# Patient Record
Sex: FEMALE | Race: WHITE | NOT HISPANIC OR LATINO | Employment: FULL TIME | URBAN - METROPOLITAN AREA
[De-identification: names, ages, dates, MRNs, and addresses within clinical notes are randomized per-mention and may not be internally consistent; named-entity substitution may affect disease eponyms.]

---

## 2017-02-22 ENCOUNTER — HOSPITAL ENCOUNTER (OUTPATIENT)
Dept: MAMMOGRAPHY | Facility: CLINIC | Age: 42
Discharge: HOME | End: 2017-02-22
Payer: COMMERCIAL

## 2017-02-22 DIAGNOSIS — Z12.31 ENCOUNTER FOR SCREENING MAMMOGRAM FOR MALIGNANT NEOPLASM OF BREAST: ICD-10-CM

## 2017-02-22 PROCEDURE — G0202 SCR MAMMO BI INCL CAD: HCPCS

## 2018-01-08 ENCOUNTER — ALLSCRIPTS OFFICE VISIT (OUTPATIENT)
Dept: OTHER | Facility: OTHER | Age: 43
End: 2018-01-08

## 2018-01-08 DIAGNOSIS — N93.9 ABNORMAL UTERINE AND VAGINAL BLEEDING, UNSPECIFIED (CODE): ICD-10-CM

## 2018-01-09 NOTE — PROGRESS NOTES
Assessment   1  Encounter for gynecological examination with abnormal finding (V72 31) (Z01 411)   2  Abnormal uterine bleeding (AUB) (626 9) (N93 9)    Discussion/Summary   health maintenance visit Currently, she eats an adequate diet and has an adequate exercise regimen  cervical cancer screening is needed every three years next cervical cancer screening is due 2019 Breast cancer screening: monthly self breast exam was advised, mammogram has been ordered and mammogram is needed every year  Advice and education were given regarding weight bearing exercise and reproductive health  Patient discussion: discussed with the patient  Annual examination was completed  Mammogram was ordered  We discussed her abnormal bleeding  She was scheduled for CBC, TSH, pelvic ultrasound  We discussed potential etiologies  I have asked patient to continue her multivitamin contains iron  Patient will return to the office in the next 2 months for re-evaluation and potential endometrial biopsy  We did briefly discuss therapies such as OCPs to manage her issue  The patient has the current Goals: Improve menses  The patent has the current Barriers: None  Patient is able to Self-Care  Chief Complaint   annual gyn exam irreg menses      History of Present Illness   HPI: Patient returns for annual gyn visit  She notes heavy irregular menses  Sometimes changing a tampon every hour  She does have bleeding that sometimes last up to 3 weeks at 1 time  She did not have any lab work performed last year and did not go for pelvic ultrasound  She overall feels okay but does have some occasional fatigue  GYN HM, Adult Female [facility]: The patient is being seen for a gynecology evaluation  The last health maintenance visit was 1 year(s) ago  General Health: The patient's health since the last visit is described as good  Lifestyle:  She exercises regularly  She exercises 3 or more times per week   Exercise includes aerobic conditioning -- She does not use tobacco  The patient has never smoked cigarettes  -- She consumes alcohol  She reports occasional alcohol use  Reproductive health:  she uses contraception  For contraception, she has a partner with a vasectomy  -- she is sexually active  She is monogamous with a male partner  Screening: Cervical cancer screening includes a pap smear performed 12/7/15-- and-- human papilloma virus screening performed 12/7/15  Breast cancer screening includes a mammogram performed 17-- and-- monthly breast self-exams performed  Colorectal cancer screening includes a colonoscopy performed 8/27/15  Review of Systems        Constitutional: No fever, no chills, feels well, no tiredness, no recent weight gain or loss  ENT: no ear ache, no loss of hearing, no nosebleeds or nasal discharge, no sore throat or hoarseness  Cardiovascular: no complaints of slow or fast heart rate, no chest pain, no palpitations, no leg claudication or lower extremity edema  Respiratory: no complaints of shortness of breath, no wheezing, no dyspnea on exertion, no orthopnea or PND  Breasts: no complaints of breast pain, breast lump or nipple discharge  Gastrointestinal: no complaints of abdominal pain, no constipation, no nausea or diarrhea, no vomiting, no bloody stools  Genitourinary: as noted in HPI  Musculoskeletal: no complaints of arthralgia, no myalgia, no joint swelling or stiffness, no limb pain or swelling  Integumentary: no complaints of skin rash or lesion, no itching or dry skin, no skin wounds  Neurological: no complaints of headache, no confusion, no numbness or tingling, no dizziness or fainting  OB History   Pregnancy History (Brief):      Prior pregnancies: : 3  Para: 2 (living)      Additional pregnancy history details: 1 miscarriage(s)       Active Problems   1  Abnormal uterine bleeding (AUB) (626 9) (N93 9)   2   BRBPR (bright red blood per rectum) (569 3) (K62 5)   3  Conjunctivitis (372 30) (H10 9)   4  Constipation (564 00) (K59 00)   5  Elderly multigravida with antepartum condition or complication (720 56) (X79 476)   6  Encounter for routine gynecological examination (V72 31) (Z01 419)   7  Encounter for routine postpartum follow-up (V24 2) (Z39 2)   8  Encounter for screening mammogram for malignant neoplasm of breast (V76 12)     (Z12 31)   9  Fetal Growth Problem Suspected, Not Found (V89 04)   10  Hemorrhoids (455 6) (K64 9)   11  Influenza vaccination given (V04 81) (Z23)   12  Pregnancy (V22 2) (Z34 90)   13  Screening for human papillomavirus (HPV) (V73 81) (Z11 51)   14  Urinary frequency (788 41) (R35 0)   15   Uterine scar from previous  delivery, antepartum condition or complication      (412 36) (O34 21)    Past Medical History    · History of Age At First Period 5 Years Old (Menarche)   · History of Encounter for gynecological examination with abnormal finding (V72 31)    (Z01 411)   · History of Inability To Conceive - Female Infertility   · History of Menarche (V21 8)   · History of Spontaneous , complete, without mention of complication (793 70)    (O03 9)   · History of  (vaginal birth after ) (654 21) (O34 219)    Surgical History    · History of  Section   · History of Foot Surgery   · History of Oral Surgery Tooth Extraction    Family History   Mother    · Family history of Family Health Status Of Mother - Alive  Father    · Family history of Family Health Status Of Father -    · Family history of leukemia (V16 6) (Z80 6)  Maternal Grandmother    · Family history of Non-Hodgkin's Lymphoma  Maternal Grandfather    · Family history of Dementia   · Family history of Parkinson Disease   · Family history of Stroke Syndrome (V17 1)  Family History    · Family history of Esophageal Cancer (V16 0)   · Family history of Family Health Status Of Mother - Good    Social History    · Being A Social Drinker   · Denied: History of Coffee   · Currently sexually active   · Daily Tea Consumption (1  Cups/Day)   · Switched recently to decaf   · Exercise: Walking   · Exercises moderately less than 3 times a week   · Marital History - Currently    · Never a smoker   · Never A Smoker   · No drug use   · Partner had vasectomy    Current Meds    1  Daily Multiple Vitamin TABS; Therapy: (Recorded:08Jan2018) to Recorded    Allergies   1  No Known Drug Allergies  2  Mold   3  Seasonal    Vitals    Recorded: [id number] 77:27CZ   Systolic 082, LUE, Sitting   Diastolic 68, LUE, Sitting   Height 5 ft 9 in   Weight 163 lb 3 2 oz   BMI Calculated 24 1   BSA Calculated 1 89   LMP [id number]     Physical Exam        Constitutional      General appearance: No acute distress, well appearing and well nourished  Neck      Neck: Normal, supple, trachea midline, no masses  Thyroid: Normal, no thyromegaly  Pulmonary      Respiratory effort: No increased work of breathing or signs of respiratory distress  Cardiovascular      Peripheral vascular exam: Normal pulses Throughout  Genitourinary      External genitalia: Normal and no lesions appreciated  Vagina: Normal, no lesions or dryness appreciated  Urethra: Normal        Urethral meatus: Normal        Bladder: Normal, soft, non-tender and no prolapse or masses appreciated  Cervix: Normal, no palpable masses  Uterus: Normal, non-tender, not enlarged, and no palpable masses  Adnexa/parametria: Normal, non-tender and no fullness or masses appreciated  Chest      Breasts: Normal and no dimpling or skin changes noted  Abdomen      Abdomen: Normal, non-tender, and no organomegaly noted  Liver and spleen: No hepatomegaly or splenomegaly  Examination for hernias: No hernias appreciated         Lymphatic      Palpation of lymph nodes in neck, axillae, groin and/or other locations: No lymphadenopathy or masses noted  Skin      Skin and subcutaneous tissue: Normal skin turgor and no rashes         Palpation of skin and subcutaneous tissue: Normal        Psychiatric      Orientation to person, place, and time: Normal        Mood and affect: Normal        Signatures    Electronically signed by : Sukhdev Chambers DO; Jan 8 2018  9:55AM EST                       (Author)

## 2018-01-17 ENCOUNTER — APPOINTMENT (OUTPATIENT)
Dept: LAB | Facility: HOSPITAL | Age: 43
End: 2018-01-17
Attending: OBSTETRICS & GYNECOLOGY
Payer: COMMERCIAL

## 2018-01-17 ENCOUNTER — TRANSCRIBE ORDERS (OUTPATIENT)
Dept: ADMINISTRATIVE | Facility: HOSPITAL | Age: 43
End: 2018-01-17

## 2018-01-17 ENCOUNTER — HOSPITAL ENCOUNTER (OUTPATIENT)
Dept: ULTRASOUND IMAGING | Facility: HOSPITAL | Age: 43
Discharge: HOME | End: 2018-01-17
Attending: OBSTETRICS & GYNECOLOGY
Payer: COMMERCIAL

## 2018-01-17 DIAGNOSIS — N93.9 ABNORMAL UTERINE AND VAGINAL BLEEDING, UNSPECIFIED (CODE): ICD-10-CM

## 2018-01-17 LAB
ERYTHROCYTE [DISTWIDTH] IN BLOOD BY AUTOMATED COUNT: 12.5 % (ref 11.6–15.1)
HCT VFR BLD AUTO: 36.4 % (ref 34.8–46.1)
HGB BLD-MCNC: 12.2 G/DL (ref 11.5–15.4)
MCH RBC QN AUTO: 31.7 PG (ref 26.8–34.3)
MCHC RBC AUTO-ENTMCNC: 33.5 G/DL (ref 31.4–37.4)
MCV RBC AUTO: 95 FL (ref 82–98)
PLATELET # BLD AUTO: 261 THOUSANDS/UL (ref 149–390)
PMV BLD AUTO: 11.6 FL (ref 8.9–12.7)
RBC # BLD AUTO: 3.85 MILLION/UL (ref 3.81–5.12)
TSH SERPL DL<=0.05 MIU/L-ACNC: 3.2 UIU/ML (ref 0.36–3.74)
WBC # BLD AUTO: 4.93 THOUSAND/UL (ref 4.31–10.16)

## 2018-01-17 PROCEDURE — 84443 ASSAY THYROID STIM HORMONE: CPT

## 2018-01-17 PROCEDURE — 76856 US EXAM PELVIC COMPLETE: CPT

## 2018-01-17 PROCEDURE — 76830 TRANSVAGINAL US NON-OB: CPT

## 2018-01-17 PROCEDURE — 36415 COLL VENOUS BLD VENIPUNCTURE: CPT

## 2018-01-17 PROCEDURE — 85027 COMPLETE CBC AUTOMATED: CPT

## 2018-01-22 ENCOUNTER — GENERIC CONVERSION - ENCOUNTER (OUTPATIENT)
Dept: OTHER | Facility: OTHER | Age: 43
End: 2018-01-22

## 2018-01-23 VITALS
SYSTOLIC BLOOD PRESSURE: 112 MMHG | DIASTOLIC BLOOD PRESSURE: 68 MMHG | BODY MASS INDEX: 24.17 KG/M2 | HEIGHT: 69 IN | WEIGHT: 163.2 LBS

## 2018-01-24 NOTE — MISCELLANEOUS
Message   Recorded as Task   Date: 2018 12:22 PM, Created By: Leo Carr   Task Name: Follow Up   Assigned To: Haily Rangel   Regarding Patient: Faisal Benítez, Status: In Progress   Comment:    Sage Molina - 2018 12:22 PM     TASK CREATED  inform pt labs and u/s wnl   will reassess at next visit   Jeri Ayers - 2018 8:23 AM     TASK IN PROGRESS   Jeri Ayers - 2018 8:25 AM     TASK EDITED  Patient is aware of results and K87 recommendations  Active Problems    1  Abnormal uterine bleeding (AUB) (626 9) (N93 9)   2  BRBPR (bright red blood per rectum) (569 3) (K62 5)   3  Conjunctivitis (372 30) (H10 9)   4  Constipation (564 00) (K59 00)   5  Elderly multigravida with antepartum condition or complication (660 47) (O84 309)   6  Encounter for gynecological examination with abnormal finding (V72 31) (Z01 411)   7  Encounter for routine gynecological examination (V72 31) (Z01 419)   8  Encounter for routine postpartum follow-up (V24 2) (Z39 2)   9  Encounter for screening mammogram for malignant neoplasm of breast (V76 12)   (Z12 31)   10  Fetal Growth Problem Suspected, Not Found (V89 04)   11  Hemorrhoids (455 6) (K64 9)   12  Influenza vaccination given (V04 81) (Z23)   13  Pregnancy (V22 2) (Z34 90)   14  Screening for human papillomavirus (HPV) (V73 81) (Z11 51)   15  Urinary frequency (788 41) (R35 0)   16  Uterine scar from previous  delivery, antepartum condition or complication    (056 02) (O34 21)    Current Meds   1  Daily Multiple Vitamin TABS; Therapy: (Recorded:2018) to Recorded    Allergies    1  No Known Drug Allergies    2  Mold   3   Seasonal    Signatures   Electronically signed by : Juliano Wisdom, ; 2018  8:25AM EST                       (Author)

## 2018-03-05 ENCOUNTER — OFFICE VISIT (OUTPATIENT)
Dept: OBGYN CLINIC | Facility: CLINIC | Age: 43
End: 2018-03-05
Payer: COMMERCIAL

## 2018-03-05 VITALS — DIASTOLIC BLOOD PRESSURE: 74 MMHG | SYSTOLIC BLOOD PRESSURE: 118 MMHG | WEIGHT: 162.8 LBS | BODY MASS INDEX: 24.04 KG/M2

## 2018-03-05 DIAGNOSIS — N92.0 MENORRHAGIA WITH REGULAR CYCLE: Primary | ICD-10-CM

## 2018-03-05 PROCEDURE — 88305 TISSUE EXAM BY PATHOLOGIST: CPT | Performed by: PATHOLOGY

## 2018-03-05 PROCEDURE — 99213 OFFICE O/P EST LOW 20 MIN: CPT | Performed by: OBSTETRICS & GYNECOLOGY

## 2018-03-05 PROCEDURE — 88305 TISSUE EXAM BY PATHOLOGIST: CPT | Performed by: OBSTETRICS & GYNECOLOGY

## 2018-03-05 PROCEDURE — 58100 BIOPSY OF UTERUS LINING: CPT | Performed by: OBSTETRICS & GYNECOLOGY

## 2018-03-05 RX ORDER — LEVONORGESTREL AND ETHINYL ESTRADIOL 0.15-0.03
1 KIT ORAL DAILY
Qty: 84 TABLET | Refills: 3 | Status: SHIPPED | OUTPATIENT
Start: 2018-03-05 | End: 2018-07-27 | Stop reason: SDUPTHER

## 2018-03-05 NOTE — PROGRESS NOTES
Assessment/Plan:      Diagnoses and all orders for this visit:    Menorrhagia with regular cycle  -     Tissue Exam        Endometrial biopsy was obtained without difficulty  We discussed management and given her heavy bleeding at this time we will proceed straight to OCPs in tapering fashion  A prescription was sent  Follow-up will be via phone with the biopsy results patient also alerted to call me in the next 3 cycles with progress if she is not responding well  Subjective:     Patient ID: Eleanor Fernández is a 36 y o  female  Patient returns for evaluation of heavy menses that are regular in timing  She has had normal CBC and TSH  Ultrasound was essentially normal there was some irregularity of the myometrium noted without discrete finding  Patient unfortunate continues to bleed heavily with each cycle with passage of clots  Review of Systems   Genitourinary: Positive for menstrual problem  All other systems reviewed and are negative  Objective:     Physical Exam   Constitutional: She appears well-developed and well-nourished  Neck: Neck supple  No thyromegaly present  Abdominal: Soft  She exhibits no mass  There is no tenderness  Genitourinary: Vagina normal and uterus normal    Neurological: She is alert  Endometrial biopsy  Date/Time: 3/5/2018 10:16 AM  Performed by: Abelardo Graves  Authorized by: Abelardo Graves     Consent:     Consent obtained:  Written    Consent given by:  Patient    Procedural risks discussed:  Bleeding, damage to other organs and infection    Patient questions answered: yes      Patient agrees, verbalizes understanding, and wants to proceed: yes      Educational handouts given: no      Instructions and paperwork completed: no    Indication:     Indications:  Other disorder of menstruation and other abnormal bleeding from female genital tract    Procedure:     Procedure: endometrial biopsy with Pipelle      A bivalve speculum was placed in the vagina: yes Cervix cleaned and prepped: yes      Uterus sounded: yes      Uterus sound depth (cm): 8 0      Specimen collected: specimen collected and sent to pathology      Patient tolerated procedure well with no complications: yes    Findings:     Cervix: normal      Adnexa: normal

## 2018-05-24 ENCOUNTER — OFFICE VISIT (OUTPATIENT)
Dept: FAMILY MEDICINE CLINIC | Facility: CLINIC | Age: 43
End: 2018-05-24
Payer: COMMERCIAL

## 2018-05-24 VITALS
HEART RATE: 87 BPM | RESPIRATION RATE: 18 BRPM | WEIGHT: 161 LBS | BODY MASS INDEX: 23.05 KG/M2 | OXYGEN SATURATION: 98 % | SYSTOLIC BLOOD PRESSURE: 118 MMHG | HEIGHT: 70 IN | TEMPERATURE: 98.5 F | DIASTOLIC BLOOD PRESSURE: 60 MMHG

## 2018-05-24 DIAGNOSIS — Z13.29 SCREENING FOR THYROID DISORDER: ICD-10-CM

## 2018-05-24 DIAGNOSIS — Z12.31 VISIT FOR SCREENING MAMMOGRAM: ICD-10-CM

## 2018-05-24 DIAGNOSIS — Z00.00 WELL ADULT EXAM: Primary | ICD-10-CM

## 2018-05-24 DIAGNOSIS — K59.09 INTERMITTENT CONSTIPATION: ICD-10-CM

## 2018-05-24 DIAGNOSIS — Z13.220 SCREENING FOR LIPID DISORDERS: ICD-10-CM

## 2018-05-24 DIAGNOSIS — Z13.1 SCREENING FOR DIABETES MELLITUS: ICD-10-CM

## 2018-05-24 PROCEDURE — 99386 PREV VISIT NEW AGE 40-64: CPT | Performed by: NURSE PRACTITIONER

## 2018-05-24 RX ORDER — FLUTICASONE PROPIONATE 50 MCG
1 SPRAY, SUSPENSION (ML) NASAL DAILY
COMMUNITY

## 2018-05-24 NOTE — ASSESSMENT & PLAN NOTE
To make an appointment for mammogram     Pap test due in December  Tetanus up-to-date  Screening labs ordered as requested  Will call with results  Follow-up as needed or in 1 year

## 2018-05-24 NOTE — PROGRESS NOTES
Subjective:     Patient ID: Beba Trujillo is a 36 y o  female  Patient presents for an Work Physical Exam  The patient reports no problems  Ana presents for an initial visit  She does history of heavy menses, she is currently on oral contraceptives  Her past surgical history is significant for foot surgery and tooth extraction  She does report intermittent constipation and diarrhea  She will have a bowel movement once every 3 days, at times her stools will be loose  Denies blood in stools  She often has indigestion after eating, specifically when going out to eat  She did have a colonoscopy a couple years ago, unsure of the exact date  This was [facility] at [facility]  This was normal   Denies fever, weight loss, appetite change, or abdominal pain  Her tetanus is up-to-date  Review of Systems   Constitutional: Negative  HENT: Negative  Eyes: Negative  Respiratory: Negative  Cardiovascular: Negative  Gastrointestinal: Positive for constipation and diarrhea  Negative for blood in stool, nausea and vomiting  Endocrine: Negative  Genitourinary: Negative  Musculoskeletal: Negative  Skin: Negative  Allergic/Immunologic: Positive for environmental allergies  Neurological: Negative  Hematological: Negative  Psychiatric/Behavioral: Negative  The following portions of the patient's history were reviewed and updated as appropriate:   She  has a past medical history of Inability to conceive, female  She  has a past surgical history that includes  section; Foot surgery; and Tooth extraction  Her family history includes Dementia in her maternal grandmother; Esophageal cancer in her family; Leukemia in her father; Lymphoma in her maternal grandmother; Parkinsonism in her maternal grandmother; Stroke in her maternal grandmother  She  reports that she has never smoked  She has never used smokeless tobacco  She reports that she drinks alcohol   She reports that she does not use drugs  Current Outpatient Prescriptions   Medication Sig Dispense Refill    fluticasone (FLONASE) 50 mcg/act nasal spray 1 spray into each nostril daily      levonorgestrel-ethinyl estradiol (NORDETTE) 0 15-30 MG-MCG per tablet Take 1 tablet by mouth daily Begin with 1 tablet BID for 3 days, and then take one tablet at HS 84 tablet 3    Multiple Vitamins-Iron (DAILY MULTIPLE VITAMIN/IRON) TABS Take by mouth       No current facility-administered medications for this visit  Current Outpatient Prescriptions on File Prior to Visit   Medication Sig    levonorgestrel-ethinyl estradiol (NORDETTE) 0 15-30 MG-MCG per tablet Take 1 tablet by mouth daily Begin with 1 tablet BID for 3 days, and then take one tablet at HS     No current facility-administered medications on file prior to visit  She is allergic to mold extract [trichophyton] and pollen extract       No results found for this or any previous visit  No exam data present    /60   Pulse 87   Temp 98 5 °F (36 9 °C)   Resp 18   Ht 5' 10" (1 778 m)   Wt 73 kg (161 lb)   LMP 05/20/2018 (Exact Date)   SpO2 98%   BMI 23 10 kg/m²   Social History     Social History    Marital status: /Civil Union     Spouse name: N/A    Number of children: N/A    Years of education: N/A     Occupational History    Not on file       Social History Main Topics    Smoking status: Never Smoker    Smokeless tobacco: Never Used    Alcohol use Yes      Comment: social    Drug use: No    Sexual activity: Yes     Birth control/ protection: Male Sterilization     Other Topics Concern    Not on file     Social History Narrative    1 cup tea/day- switched recently to decaf    Exercise - walking    Exercises moderately less than 3 times/wk     Past Medical History:   Diagnosis Date    Inability to conceive, female      Family History   Problem Relation Age of Onset    Leukemia Father     Dementia Maternal Grandmother     Parkinsonism Maternal Grandmother     Stroke Maternal Grandmother     Lymphoma Maternal Grandmother     Esophageal cancer Family       Past Surgical History:   Procedure Laterality Date     SECTION      FOOT SURGERY      TOOTH EXTRACTION         Current Outpatient Prescriptions:     fluticasone (FLONASE) 50 mcg/act nasal spray, 1 spray into each nostril daily, Disp: , Rfl:     levonorgestrel-ethinyl estradiol (NORDETTE) 0 15-30 MG-MCG per tablet, Take 1 tablet by mouth daily Begin with 1 tablet BID for 3 days, and then take one tablet at HS, Disp: 84 tablet, Rfl: 3    Multiple Vitamins-Iron (DAILY MULTIPLE VITAMIN/IRON) TABS, Take by mouth, Disp: , Rfl:       Colonoscopy a couple years ago, unsure of exact year  Normal    Pap Test 2015      Screening Labs None recent     Preventive screening:   Last mammogram: normal Date:    Last pap: normal Date:       Objective:     Physical Exam   Constitutional: She is oriented to person, place, and time  She appears well-developed and well-nourished  No distress  HENT:   Head: Normocephalic and atraumatic  Right Ear: Tympanic membrane and external ear normal    Left Ear: Tympanic membrane and external ear normal    Nose: Nose normal    Mouth/Throat: Oropharynx is clear and moist    Eyes: Conjunctivae and EOM are normal  Pupils are equal, round, and reactive to light  Neck: Normal range of motion  Neck supple  Cardiovascular: Normal rate, regular rhythm and normal heart sounds  No murmur heard  Pulmonary/Chest: Effort normal and breath sounds normal  No respiratory distress  She has no wheezes  She has no rales  She exhibits no tenderness  Abdominal: Soft  Bowel sounds are normal  She exhibits no distension and no mass  There is no tenderness  There is no rebound and no guarding  Musculoskeletal: Normal range of motion  She exhibits no edema, tenderness or deformity  Lymphadenopathy:     She has no cervical adenopathy     Neurological: She is alert and oriented to person, place, and time  No cranial nerve deficit  Skin: Skin is warm and dry  No rash noted  No erythema  No pallor  Psychiatric: She has a normal mood and affect  Her behavior is normal  Judgment and thought content normal    Nursing note and vitals reviewed  Assessment/Plan:   Well adult exam  To make an appointment for mammogram     Pap test due in December  Tetanus up-to-date  Screening labs ordered as requested  Will call with results  Follow-up as needed or in 1 year  Intermittent constipation  Counseled on the importance adequate dietary fiber and fluid intake  Counseled on daily physical activity  Discussed trying a probiotic  To follow up with our office if no improvement

## 2018-05-24 NOTE — ASSESSMENT & PLAN NOTE
Counseled on the importance adequate dietary fiber and fluid intake  Counseled on daily physical activity  Discussed trying a probiotic  To follow up with our office if no improvement

## 2018-05-25 ENCOUNTER — LAB (OUTPATIENT)
Dept: LAB | Facility: HOSPITAL | Age: 43
End: 2018-05-25
Payer: COMMERCIAL

## 2018-05-25 DIAGNOSIS — Z13.29 SCREENING FOR THYROID DISORDER: ICD-10-CM

## 2018-05-25 DIAGNOSIS — Z13.220 SCREENING FOR LIPID DISORDERS: ICD-10-CM

## 2018-05-25 DIAGNOSIS — Z13.1 SCREENING FOR DIABETES MELLITUS: ICD-10-CM

## 2018-05-25 LAB
ALBUMIN SERPL BCP-MCNC: 3.4 G/DL (ref 3.5–5)
ALP SERPL-CCNC: 68 U/L (ref 46–116)
ALT SERPL W P-5'-P-CCNC: 23 U/L (ref 12–78)
ANION GAP SERPL CALCULATED.3IONS-SCNC: 10 MMOL/L (ref 4–13)
AST SERPL W P-5'-P-CCNC: 15 U/L (ref 5–45)
BILIRUB SERPL-MCNC: 0.4 MG/DL (ref 0.2–1)
BUN SERPL-MCNC: 12 MG/DL (ref 5–25)
CALCIUM SERPL-MCNC: 9.2 MG/DL (ref 8.3–10.1)
CHLORIDE SERPL-SCNC: 105 MMOL/L (ref 100–108)
CHOLEST SERPL-MCNC: 153 MG/DL (ref 50–200)
CO2 SERPL-SCNC: 28 MMOL/L (ref 21–32)
CREAT SERPL-MCNC: 0.82 MG/DL (ref 0.6–1.3)
GFR SERPL CREATININE-BSD FRML MDRD: 88 ML/MIN/1.73SQ M
GLUCOSE P FAST SERPL-MCNC: 91 MG/DL (ref 65–99)
HDLC SERPL-MCNC: 42 MG/DL (ref 40–60)
LDLC SERPL CALC-MCNC: 104 MG/DL (ref 0–100)
POTASSIUM SERPL-SCNC: 3.7 MMOL/L (ref 3.5–5.3)
PROT SERPL-MCNC: 7.4 G/DL (ref 6.4–8.2)
SODIUM SERPL-SCNC: 143 MMOL/L (ref 136–145)
TRIGL SERPL-MCNC: 36 MG/DL
TSH SERPL DL<=0.05 MIU/L-ACNC: 2.35 UIU/ML (ref 0.36–3.74)

## 2018-05-25 PROCEDURE — 80061 LIPID PANEL: CPT

## 2018-05-25 PROCEDURE — 80053 COMPREHEN METABOLIC PANEL: CPT

## 2018-05-25 PROCEDURE — 84443 ASSAY THYROID STIM HORMONE: CPT

## 2018-05-25 PROCEDURE — 36415 COLL VENOUS BLD VENIPUNCTURE: CPT

## 2018-06-05 ENCOUNTER — HOSPITAL ENCOUNTER (OUTPATIENT)
Dept: MAMMOGRAPHY | Facility: CLINIC | Age: 43
Discharge: HOME | End: 2018-06-05
Payer: COMMERCIAL

## 2018-06-05 DIAGNOSIS — Z12.31 VISIT FOR SCREENING MAMMOGRAM: ICD-10-CM

## 2018-06-05 PROCEDURE — 77067 SCR MAMMO BI INCL CAD: CPT

## 2018-06-05 PROCEDURE — 77063 BREAST TOMOSYNTHESIS BI: CPT

## 2018-07-27 DIAGNOSIS — N92.0 MENORRHAGIA WITH REGULAR CYCLE: Primary | ICD-10-CM

## 2018-07-27 RX ORDER — LEVONORGESTREL AND ETHINYL ESTRADIOL 0.15-0.03
1 KIT ORAL DAILY
Qty: 84 TABLET | Refills: 1 | Status: SHIPPED | OUTPATIENT
Start: 2018-07-27 | End: 2020-01-20 | Stop reason: ALTCHOICE

## 2018-12-20 ENCOUNTER — OFFICE VISIT (OUTPATIENT)
Dept: URGENT CARE | Facility: CLINIC | Age: 43
End: 2018-12-20
Payer: COMMERCIAL

## 2018-12-20 VITALS
RESPIRATION RATE: 18 BRPM | HEART RATE: 85 BPM | HEIGHT: 70 IN | DIASTOLIC BLOOD PRESSURE: 80 MMHG | BODY MASS INDEX: 23.62 KG/M2 | WEIGHT: 165 LBS | SYSTOLIC BLOOD PRESSURE: 124 MMHG | OXYGEN SATURATION: 98 % | TEMPERATURE: 97.6 F

## 2018-12-20 DIAGNOSIS — H10.9 CONJUNCTIVITIS OF BOTH EYES, UNSPECIFIED CONJUNCTIVITIS TYPE: Primary | ICD-10-CM

## 2018-12-20 PROCEDURE — S9083 URGENT CARE CENTER GLOBAL: HCPCS | Performed by: PHYSICIAN ASSISTANT

## 2018-12-20 PROCEDURE — G0382 LEV 3 HOSP TYPE B ED VISIT: HCPCS | Performed by: PHYSICIAN ASSISTANT

## 2018-12-20 RX ORDER — TOBRAMYCIN 3 MG/ML
1 SOLUTION/ DROPS OPHTHALMIC
Qty: 1 BOTTLE | Refills: 0 | Status: SHIPPED | OUTPATIENT
Start: 2018-12-20 | End: 2018-12-27

## 2018-12-20 NOTE — PATIENT INSTRUCTIONS
1  Conjunctivitis  -Use eye drops as directed  -Wash hands well  -Wash pillowcases  -Follow-up with PCP if no improvement within 5-7 days    Go to ER with worsening symptoms, fever, visual changes or any new concerns    Conjunctivitis   AMBULATORY CARE:   Conjunctivitis,  or pink eye, is inflammation of your conjunctiva  The conjunctiva is a thin tissue that covers the front of your eye and the back of your eyelids  The conjunctiva helps protect your eye and keep it moist  Conjunctivitis may be caused by bacteria, allergies, or a virus  If your conjunctivitis is caused by bacteria, it may get better on its own in about 7 days  Viral conjunctivitis can last up to 3 weeks  Common symptoms may include any of the following: You will usually have symptoms in both eyes if your conjunctivitis is caused by allergies  You may also have other allergic symptoms, such as a rash or runny nose  Symptoms will usually start in 1 eye if your conjunctivitis is caused by a virus or bacteria  · Redness in the whites of your eye    · Itching in your eye or around your eye    · Feeling like there is something in your eye    · Watery or thick, sticky discharge    · Crusty eyelids when you wake up in the morning    · Burning, stinging, or swelling in your eye    · Pain when you see bright light  Seek care immediately if:   · You have worsening eye pain  · The swelling in your eye gets worse, even after treatment  · Your vision suddenly becomes worse or you cannot see at all  Contact your healthcare provider if:   · You develop a fever and ear pain  · You have tiny bumps or spots of blood on your eye  · You have questions or concerns about your condition or care  Treatment  will depend on the cause of your conjunctivitis  You may need antibiotics or allergy medicine as a pill, eye drop, or eye ointment  Manage your symptoms:   · Apply a cool compress  Wet a washcloth with cold water and place it on your eye   This will help decrease itching and irritation  · Do not wear contact lenses  They can irritate your eye  Throw away the pair you are using and ask when you can wear them again  Use a new pair of lenses when your healthcare provider says it is okay  · Avoid irritants  Stay away from smoke filled areas  Shield your eyes from wind and sun  · Flush your eye  You may need to flush your eye with saline to help decrease your symptoms  Ask for more information on how to flush your eye  Medicines:  Treatment depends on what is causing your conjunctivitis  You may be given any of the following:  · Allergy medicine  helps decrease itchy, red, swollen eyes caused by allergies  It may be given as a pill, eye drops, or nasal spray  · Antibiotics  may be needed if your conjunctivitis is caused by bacteria  This medicine may be given as a pill, eye drops, or eye ointment  · Take your medicine as directed  Contact your healthcare provider if you think your medicine is not helping or if you have side effects  Tell him or her if you are allergic to any medicine  Keep a list of the medicines, vitamins, and herbs you take  Include the amounts, and when and why you take them  Bring the list or the pill bottles to follow-up visits  Carry your medicine list with you in case of an emergency  Prevent the spread of conjunctivitis:   · Wash your hands with soap and water often  Wash your hands before and after you touch your eyes  Also wash your hands before you prepare or eat food and after you use the bathroom or change a diaper  · Avoid allergens  Try to avoid the things that cause your allergies, such as pets, dust, or grass  · Avoid contact with others  Do not share towels or washcloths  Try to stay away from others as much as possible  Ask when you can return to work or school  · Throw away eye makeup  The bacteria that caused your conjunctivitis can stay in eye makeup   Throw away mascara and other eye makeup  © 2017 [facility] Information is for End User's use only and may not be sold, redistributed or otherwise used for commercial purposes  All illustrations and images included in CareNotes® are the copyrighted property of [employer] D [employer] M [employer] Inc  or Trell Carr  The above information is an  only  It is not intended as medical advice for individual conditions or treatments  Talk to your doctor, nurse or pharmacist before following any medical regimen to see if it is safe and effective for you

## 2018-12-20 NOTE — PROGRESS NOTES
[email or web address]        NAME: Kayleen Rojas is a 39 y o  female  : 1975    MRN: [id number]  DATE: 2018  TIME: 8:37 AM    Assessment and Plan   Conjunctivitis of both eyes, unspecified conjunctivitis type [H10 9]  1  Conjunctivitis of both eyes, unspecified conjunctivitis type  tobramycin (TOBREX) 0 3 % SOLN         Patient Instructions     1  Conjunctivitis  -Use eye drops as directed  -Wash hands well  -Wash pillowcases  -Follow-up with PCP if no improvement within 5-7 days    Go to ER with worsening symptoms, fever, visual changes or any new concerns    Chief Complaint     Chief Complaint   Patient presents with    Conjunctivitis     x24 hrs, bilat         History of Present Illness       Patient is a 58-year-old female who presents today for evaluation of bilateral eye redness that started late last night/early this morning  Patient states that this morning she said that her eyes were very red and she had thick drainage from them  She states that her eyes are itchy  Patient states that she works in [employer] and was around little kids yesterday  Patient denies contact lens use  She denies any injury or trauma to her eyes  Review of Systems   Review of Systems   Constitutional: Negative for chills and fever  HENT: Negative for ear pain, rhinorrhea and sore throat  Eyes: Positive for discharge, redness and itching  Negative for photophobia, pain and visual disturbance  Neurological: Negative for headaches           Current Medications       Current Outpatient Prescriptions:     fluticasone (FLONASE) 50 mcg/act nasal spray, 1 spray into each nostril daily, Disp: , Rfl:     levonorgestrel-ethinyl estradiol (NORDETTE) 0 15-30 MG-MCG per tablet, Take 1 tablet by mouth daily Begin with 1 tablet BID for 3 days, and then take one tablet at HS, Disp: 84 tablet, Rfl: 1    Multiple Vitamins-Iron (DAILY MULTIPLE VITAMIN/IRON) TABS, Take by mouth, Disp: , Rfl:     tobramycin (TOBREX) 0 3 % SOLN, Administer 1 drop to both eyes every 4 (four) hours while awake for 7 days, Disp: 1 Bottle, Rfl: 0    Current Allergies     Allergies as of 2018 - Reviewed 2018   Allergen Reaction Noted    Mold extract [trichophyton] Itching and Sneezing 09/10/2013    Pollen extract Itching and Sneezing 09/10/2013            The following portions of the patient's history were reviewed and updated as appropriate: allergies, current medications, past family history, past medical history, past social history, past surgical history and problem list      Past Medical History:   Diagnosis Date    Inability to conceive, female        Past Surgical History:   Procedure Laterality Date     SECTION      FOOT SURGERY      TOOTH EXTRACTION         Family History   Problem Relation Age of Onset    Leukemia Father     Dementia Maternal Grandmother     Parkinsonism Maternal Grandmother     Stroke Maternal Grandmother     Lymphoma Maternal Grandmother     Esophageal cancer Family          Medications have been verified  Objective   /80   Pulse 85   Temp 97 6 °F (36 4 °C) (Temporal)   Resp 18   Ht 5' 10" (1 778 m)   Wt 74 8 kg (165 lb)   SpO2 98%   BMI 23 68 kg/m²        Physical Exam     Physical Exam   Constitutional: She is oriented to person, place, and time  She appears well-developed and well-nourished  No distress  HENT:   Right Ear: Tympanic membrane and external ear normal    Left Ear: Tympanic membrane and external ear normal    Nose: Nose normal    Mouth/Throat: Uvula is midline, oropharynx is clear and moist and mucous membranes are normal    Eyes: Pupils are equal, round, and reactive to light  EOM are normal  Right conjunctiva is injected  Left conjunctiva is injected  Neck: Normal range of motion  Neck supple  Cardiovascular: Normal rate, regular rhythm and normal heart sounds      Pulmonary/Chest: Effort normal and breath sounds normal  She has no wheezes  She has no rales  Lymphadenopathy:     She has no cervical adenopathy  Neurological: She is alert and oriented to person, place, and time  Skin: Skin is warm and dry  Psychiatric: She has a normal mood and affect  Nursing note and vitals reviewed

## 2018-12-28 ENCOUNTER — OFFICE VISIT (OUTPATIENT)
Dept: URGENT CARE | Facility: CLINIC | Age: 43
End: 2018-12-28
Payer: COMMERCIAL

## 2018-12-28 VITALS
HEART RATE: 89 BPM | TEMPERATURE: 98.6 F | RESPIRATION RATE: 18 BRPM | DIASTOLIC BLOOD PRESSURE: 78 MMHG | SYSTOLIC BLOOD PRESSURE: 120 MMHG | BODY MASS INDEX: 23.62 KG/M2 | WEIGHT: 165 LBS | HEIGHT: 70 IN | OXYGEN SATURATION: 98 %

## 2018-12-28 DIAGNOSIS — J06.9 UPPER RESPIRATORY TRACT INFECTION, UNSPECIFIED TYPE: Primary | ICD-10-CM

## 2018-12-28 PROCEDURE — S9083 URGENT CARE CENTER GLOBAL: HCPCS | Performed by: PHYSICIAN ASSISTANT

## 2018-12-28 PROCEDURE — G0382 LEV 3 HOSP TYPE B ED VISIT: HCPCS | Performed by: PHYSICIAN ASSISTANT

## 2018-12-28 NOTE — PROGRESS NOTES
[facility] [employer] [facility]        NAME: Noreen San is a 39 y o  female  : 1975    MRN: [id number]  DATE: 2018  TIME: 9:19 AM    Assessment and Plan   Upper respiratory tract infection, unspecified type [J06 9]  1  Upper respiratory tract infection, unspecified type           Patient Instructions     1  Upper respiratory infection  -Patient declines throat swab  -Finish course of eye drops as prescribed  -Increase fluids  -Tylenol/motrin  -Salt H20 gargles/throat lozenges  -Saline nasal spray  -Try using humidifier at nighttime    -Follow-up with PCP within 5 days  If no improvement of drainage from eyes- follow-up with an eye doctor for re-evaluation next week  -Go to ER with worsening symptoms, difficulty breathing, high fever, or any signs of distress      Chief Complaint     Chief Complaint   Patient presents with    Sore Throat     x24 hrs   Nasal Congestion    Conjunctivitis         History of Present Illness       Patient is a 50-year-old female who presents today for evaluation of cold symptoms that started yesterday  Patient admits having some nasal congestion along with a slight sore throat  Patient was seen here last week and was diagnosed with conjunctivitis and was given tobramycin eyedrops  Patient states that she has been using the eyedrops as directed however this morning she still continues to have some drainage from her eyes  However she states that overall the eye redness has improved  She denies any visual changes  Review of Systems   Review of Systems   Constitutional: Negative for chills and fever  HENT: Positive for rhinorrhea and sore throat  Negative for ear pain  Eyes: Positive for discharge and redness  Negative for pain and visual disturbance  Respiratory: Negative for shortness of breath  Cardiovascular: Negative for chest pain  Musculoskeletal: Negative for arthralgias  Neurological: Negative for headaches           Current Medications Current Outpatient Prescriptions:     fluticasone (FLONASE) 50 mcg/act nasal spray, 1 spray into each nostril daily, Disp: , Rfl:     levonorgestrel-ethinyl estradiol (NORDETTE) 0 15-30 MG-MCG per tablet, Take 1 tablet by mouth daily Begin with 1 tablet BID for 3 days, and then take one tablet at HS, Disp: 84 tablet, Rfl: 1    Multiple Vitamins-Iron (DAILY MULTIPLE VITAMIN/IRON) TABS, Take by mouth, Disp: , Rfl:     Current Allergies     Allergies as of 2018 - Reviewed 2018   Allergen Reaction Noted    Mold extract [trichophyton] Itching and Sneezing 09/10/2013    Pollen extract Itching and Sneezing 09/10/2013            The following portions of the patient's history were reviewed and updated as appropriate: allergies, current medications, past family history, past medical history, past social history, past surgical history and problem list      Past Medical History:   Diagnosis Date    Inability to conceive, female        Past Surgical History:   Procedure Laterality Date     SECTION      FOOT SURGERY      TOOTH EXTRACTION         Family History   Problem Relation Age of Onset    Leukemia Father     Dementia Maternal Grandmother     Parkinsonism Maternal Grandmother     Stroke Maternal Grandmother     Lymphoma Maternal Grandmother     Esophageal cancer Family          Medications have been verified  Objective   /78   Pulse 89   Temp 98 6 °F (37 °C) (Temporal)   Resp 18   Ht 5' 10" (1 778 m)   Wt 74 8 kg (165 lb)   SpO2 98%   BMI 23 68 kg/m²        Physical Exam     Physical Exam   Constitutional: She is oriented to person, place, and time  She appears well-developed and well-nourished  No distress  HENT:   Right Ear: Tympanic membrane and external ear normal    Left Ear: Tympanic membrane and external ear normal    Nose: Nose normal    Mouth/Throat: Uvula is midline and mucous membranes are normal  Posterior oropharyngeal erythema present   No oropharyngeal exudate or tonsillar abscesses  Eyes: Pupils are equal, round, and reactive to light  Conjunctivae and EOM are normal  Right eye exhibits no discharge  Left eye exhibits no discharge  Neck: Normal range of motion  Neck supple  Cardiovascular: Normal rate, regular rhythm and normal heart sounds  Pulmonary/Chest: Effort normal and breath sounds normal  She has no wheezes  She has no rales  Lymphadenopathy:     She has no cervical adenopathy  Neurological: She is alert and oriented to person, place, and time  Skin: Skin is warm and dry  Psychiatric: She has a normal mood and affect  Nursing note and vitals reviewed

## 2018-12-28 NOTE — PATIENT INSTRUCTIONS
1  Upper respiratory infection  -Patient declines throat swab  -Finish course of eye drops as prescribed  -Increase fluids  -Tylenol/motrin  -Salt H20 gargles/throat lozenges  -Saline nasal spray  -Try using humidifier at nighttime    -Follow-up with PCP within 5 days   If no improvement of drainage from eyes- follow-up with an eye doctor for re-evaluation next week  -Go to ER with worsening symptoms, difficulty breathing, high fever, or any signs of distress

## 2019-01-15 ENCOUNTER — ANNUAL EXAM (OUTPATIENT)
Dept: OBGYN CLINIC | Facility: CLINIC | Age: 44
End: 2019-01-15
Payer: COMMERCIAL

## 2019-01-15 VITALS
BODY MASS INDEX: 24.39 KG/M2 | SYSTOLIC BLOOD PRESSURE: 92 MMHG | DIASTOLIC BLOOD PRESSURE: 60 MMHG | WEIGHT: 170.4 LBS | HEIGHT: 70 IN

## 2019-01-15 DIAGNOSIS — R92.2 DENSE BREAST: ICD-10-CM

## 2019-01-15 DIAGNOSIS — Z01.419 ENCOUNTER FOR GYNECOLOGICAL EXAMINATION (GENERAL) (ROUTINE) WITHOUT ABNORMAL FINDINGS: Primary | ICD-10-CM

## 2019-01-15 DIAGNOSIS — Z12.39 SCREENING FOR MALIGNANT NEOPLASM OF BREAST: ICD-10-CM

## 2019-01-15 DIAGNOSIS — Z11.51 SCREENING FOR HUMAN PAPILLOMAVIRUS (HPV): ICD-10-CM

## 2019-01-15 DIAGNOSIS — Z12.4 CERVICAL CANCER SCREENING: ICD-10-CM

## 2019-01-15 PROBLEM — R92.30 DENSE BREAST: Status: ACTIVE | Noted: 2019-01-15

## 2019-01-15 PROCEDURE — G0145 SCR C/V CYTO,THINLAYER,RESCR: HCPCS | Performed by: OBSTETRICS & GYNECOLOGY

## 2019-01-15 PROCEDURE — 87624 HPV HI-RISK TYP POOLED RSLT: CPT | Performed by: OBSTETRICS & GYNECOLOGY

## 2019-01-15 PROCEDURE — S0612 ANNUAL GYNECOLOGICAL EXAMINA: HCPCS | Performed by: OBSTETRICS & GYNECOLOGY

## 2019-01-15 NOTE — PROGRESS NOTES
Assessment/Plan:    No problem-specific Assessment & Plan notes found for this encounter  Diagnoses and all orders for this visit:    Encounter for gynecological examination (general) (routine) without abnormal findings  -     Liquid-based pap, screening    Dense breast  -     Mammo screening bilateral w 3d & cad; Future    Screening for malignant neoplasm of breast  -     Mammo screening bilateral w 3d & cad; Future    Cervical cancer screening  -     Liquid-based pap, screening    Screening for human papillomavirus (HPV)  -     Liquid-based pap, screening        Annual examination was completed  Pap with HPV testing was obtained  Mammogram was ordered  Patient will continue on OCPs for cycle control        Patient to return to the office in 1 year or as necessary  Subjective:      Patient ID: Manuel Vidales is a 39 y o  female  Patient returns for annual gyn visit  She has no new medical surgical issues  She has done quite well with control of her menses with OCPs  There are properly timed an average in flow  She has no intermenstrual bleeding  Patient is also begun a aggressive exercise routine and is running on a regular basis  Gynecologic Exam         The following portions of the patient's history were reviewed and updated as appropriate:   She  has a past medical history of Inability to conceive, female    She   Patient Active Problem List    Diagnosis Date Noted    Encounter for gynecological examination (general) (routine) without abnormal findings 01/15/2019    Dense breast 01/15/2019    Screening for malignant neoplasm of breast 01/15/2019    Cervical cancer screening 01/15/2019    Screening for human papillomavirus (HPV) 01/15/2019    Well adult exam 05/24/2018    Visit for screening mammogram 05/24/2018    Screening for lipid disorders 05/24/2018    Screening for thyroid disorder 05/24/2018    Screening for diabetes mellitus 05/24/2018    Intermittent constipation 2018    Menorrhagia with regular cycle 2018     She  has a past surgical history that includes  section; Foot surgery; and Tooth extraction  Her family history includes Dementia in her maternal grandmother; Esophageal cancer in her family; Leukemia in her father; Lymphoma in her maternal grandmother; Parkinsonism in her maternal grandmother; Stroke in her maternal grandmother  She  reports that she has never smoked  She has never used smokeless tobacco  She reports that she drinks alcohol  She reports that she does not use drugs  Current Outpatient Prescriptions   Medication Sig Dispense Refill    fluticasone (FLONASE) 50 mcg/act nasal spray 1 spray into each nostril daily      levonorgestrel-ethinyl estradiol (NORDETTE) 0 15-30 MG-MCG per tablet Take 1 tablet by mouth daily Begin with 1 tablet BID for 3 days, and then take one tablet at HS 84 tablet 1    Multiple Vitamins-Iron (DAILY MULTIPLE VITAMIN/IRON) TABS Take by mouth       No current facility-administered medications for this visit  Current Outpatient Prescriptions on File Prior to Visit   Medication Sig    fluticasone (FLONASE) 50 mcg/act nasal spray 1 spray into each nostril daily    levonorgestrel-ethinyl estradiol (NORDETTE) 0 15-30 MG-MCG per tablet Take 1 tablet by mouth daily Begin with 1 tablet BID for 3 days, and then take one tablet at HS    Multiple Vitamins-Iron (DAILY MULTIPLE VITAMIN/IRON) TABS Take by mouth     No current facility-administered medications on file prior to visit  She is allergic to mold extract [trichophyton] and pollen extract       Review of Systems   All other systems reviewed and are negative  Objective:      BP 92/60 (BP Location: Left arm, Patient Position: Sitting, Cuff Size: Standard)   Ht 5' 10" (1 778 m)   Wt 77 3 kg (170 lb 6 4 oz)   LMP 2019 (Exact Date)   BMI 24 45 kg/m²          Physical Exam   Constitutional: She is oriented to person, place, and time   She appears well-developed and well-nourished  HENT:   Head: Normocephalic and atraumatic  Nose: Nose normal    Eyes: Pupils are equal, round, and reactive to light  EOM are normal    Neck: Normal range of motion  Neck supple  No thyromegaly present  Cardiovascular: Normal rate and regular rhythm  Pulmonary/Chest: Effort normal and breath sounds normal  No respiratory distress  Breasts no masses, tenderness, skin changes   Abdominal: Soft  Bowel sounds are normal  She exhibits no distension and no mass  There is no tenderness  Hernia confirmed negative in the right inguinal area and confirmed negative in the left inguinal area  Genitourinary: Vagina normal and uterus normal  No breast swelling, tenderness, discharge or bleeding  Pelvic exam was performed with patient supine  No labial fusion  There is no rash, tenderness, lesion or injury on the right labia  There is no rash, tenderness, lesion or injury on the left labia  Cervix exhibits no motion tenderness, no discharge and no friability  Genitourinary Comments: Ext genitalia nl female w/o lesions  Vag healthy without lesions or discharge  Cervix healthy w/o lesions or discharge  uterus nl size, NT, no mass  Adnexa NT, no mass   Musculoskeletal: Normal range of motion  She exhibits no edema  Lymphadenopathy:        Right: No inguinal adenopathy present  Left: No inguinal adenopathy present  Neurological: She is alert and oriented to person, place, and time  She has normal reflexes  Skin: Skin is warm and dry  No rash noted  Psychiatric: She has a normal mood and affect  Her behavior is normal  Thought content normal    Nursing note and vitals reviewed

## 2019-01-16 LAB
HPV HR 12 DNA CVX QL NAA+PROBE: NEGATIVE
HPV16 DNA CVX QL NAA+PROBE: NEGATIVE
HPV18 DNA CVX QL NAA+PROBE: NEGATIVE

## 2019-01-23 LAB
LAB AP GYN PRIMARY INTERPRETATION: NORMAL
LAB AP LMP: NORMAL
Lab: NORMAL

## 2019-03-31 DIAGNOSIS — N92.0 MENORRHAGIA WITH REGULAR CYCLE: ICD-10-CM

## 2019-03-31 RX ORDER — LEVONORGESTREL AND ETHINYL ESTRADIOL 0.15-0.03
KIT ORAL
Qty: 84 TABLET | Refills: 2 | Status: SHIPPED | OUTPATIENT
Start: 2019-03-31 | End: 2019-12-04 | Stop reason: SDUPTHER

## 2019-04-29 ENCOUNTER — TELEPHONE (OUTPATIENT)
Dept: FAMILY MEDICINE CLINIC | Facility: CLINIC | Age: 44
End: 2019-04-29

## 2019-05-01 DIAGNOSIS — Z82.49 FAMILY HISTORY OF BRAIN ANEURYSM: Primary | ICD-10-CM

## 2019-06-06 ENCOUNTER — HOSPITAL ENCOUNTER (OUTPATIENT)
Dept: MAMMOGRAPHY | Facility: CLINIC | Age: 44
Discharge: HOME | End: 2019-06-06
Payer: COMMERCIAL

## 2019-06-06 ENCOUNTER — HOSPITAL ENCOUNTER (OUTPATIENT)
Dept: MRI IMAGING | Facility: CLINIC | Age: 44
Discharge: HOME | End: 2019-06-06
Payer: COMMERCIAL

## 2019-06-06 VITALS — HEIGHT: 70 IN | WEIGHT: 165 LBS | BODY MASS INDEX: 23.62 KG/M2

## 2019-06-06 DIAGNOSIS — R92.2 DENSE BREAST: ICD-10-CM

## 2019-06-06 DIAGNOSIS — Z12.39 SCREENING FOR MALIGNANT NEOPLASM OF BREAST: ICD-10-CM

## 2019-06-06 DIAGNOSIS — Z82.49 FAMILY HISTORY OF BRAIN ANEURYSM: ICD-10-CM

## 2019-06-06 PROCEDURE — 77063 BREAST TOMOSYNTHESIS BI: CPT

## 2019-06-06 PROCEDURE — 77067 SCR MAMMO BI INCL CAD: CPT

## 2019-06-06 PROCEDURE — 70544 MR ANGIOGRAPHY HEAD W/O DYE: CPT

## 2019-06-07 ENCOUNTER — TELEPHONE (OUTPATIENT)
Dept: FAMILY MEDICINE CLINIC | Facility: CLINIC | Age: 44
End: 2019-06-07

## 2019-06-18 ENCOUNTER — OFFICE VISIT (OUTPATIENT)
Dept: OBGYN CLINIC | Facility: MEDICAL CENTER | Age: 44
End: 2019-06-18
Payer: COMMERCIAL

## 2019-06-18 VITALS
HEIGHT: 70 IN | SYSTOLIC BLOOD PRESSURE: 112 MMHG | BODY MASS INDEX: 24.21 KG/M2 | DIASTOLIC BLOOD PRESSURE: 82 MMHG | WEIGHT: 169.13 LBS

## 2019-06-18 DIAGNOSIS — N76.0 ACUTE VAGINITIS: Primary | ICD-10-CM

## 2019-06-18 PROBLEM — Z12.4 CERVICAL CANCER SCREENING: Status: RESOLVED | Noted: 2019-01-15 | Resolved: 2019-06-18

## 2019-06-18 PROBLEM — Z13.220 SCREENING FOR LIPID DISORDERS: Status: RESOLVED | Noted: 2018-05-24 | Resolved: 2019-06-18

## 2019-06-18 PROBLEM — Z00.00 WELL ADULT EXAM: Status: RESOLVED | Noted: 2018-05-24 | Resolved: 2019-06-18

## 2019-06-18 PROBLEM — N92.0 MENORRHAGIA WITH REGULAR CYCLE: Status: RESOLVED | Noted: 2018-03-05 | Resolved: 2019-06-18

## 2019-06-18 PROBLEM — Z11.51 SCREENING FOR HUMAN PAPILLOMAVIRUS (HPV): Status: RESOLVED | Noted: 2019-01-15 | Resolved: 2019-06-18

## 2019-06-18 PROBLEM — Z01.419 ENCOUNTER FOR GYNECOLOGICAL EXAMINATION (GENERAL) (ROUTINE) WITHOUT ABNORMAL FINDINGS: Status: RESOLVED | Noted: 2019-01-15 | Resolved: 2019-06-18

## 2019-06-18 PROBLEM — Z13.29 SCREENING FOR THYROID DISORDER: Status: RESOLVED | Noted: 2018-05-24 | Resolved: 2019-06-18

## 2019-06-18 PROBLEM — Z13.1 SCREENING FOR DIABETES MELLITUS: Status: RESOLVED | Noted: 2018-05-24 | Resolved: 2019-06-18

## 2019-06-18 PROBLEM — Z12.39 SCREENING FOR MALIGNANT NEOPLASM OF BREAST: Status: RESOLVED | Noted: 2019-01-15 | Resolved: 2019-06-18

## 2019-06-18 PROBLEM — Z12.31 VISIT FOR SCREENING MAMMOGRAM: Status: RESOLVED | Noted: 2018-05-24 | Resolved: 2019-06-18

## 2019-06-18 PROCEDURE — 87510 GARDNER VAG DNA DIR PROBE: CPT | Performed by: PHYSICIAN ASSISTANT

## 2019-06-18 PROCEDURE — 87480 CANDIDA DNA DIR PROBE: CPT | Performed by: PHYSICIAN ASSISTANT

## 2019-06-18 PROCEDURE — 99213 OFFICE O/P EST LOW 20 MIN: CPT | Performed by: PHYSICIAN ASSISTANT

## 2019-06-18 PROCEDURE — 87660 TRICHOMONAS VAGIN DIR PROBE: CPT | Performed by: PHYSICIAN ASSISTANT

## 2019-06-19 ENCOUNTER — TELEPHONE (OUTPATIENT)
Dept: OBGYN CLINIC | Facility: CLINIC | Age: 44
End: 2019-06-19

## 2019-06-19 DIAGNOSIS — B96.89 BACTERIAL VAGINITIS: Primary | ICD-10-CM

## 2019-06-19 DIAGNOSIS — N76.0 BACTERIAL VAGINITIS: Primary | ICD-10-CM

## 2019-06-19 LAB
CANDIDA RRNA VAG QL PROBE: NEGATIVE
G VAGINALIS RRNA GENITAL QL PROBE: POSITIVE
T VAGINALIS RRNA GENITAL QL PROBE: NEGATIVE

## 2019-06-19 RX ORDER — METRONIDAZOLE 7.5 MG/G
1 GEL VAGINAL
Qty: 70 G | Refills: 0 | Status: SHIPPED | OUTPATIENT
Start: 2019-06-19 | End: 2019-06-24

## 2019-12-04 DIAGNOSIS — N92.0 MENORRHAGIA WITH REGULAR CYCLE: ICD-10-CM

## 2019-12-04 RX ORDER — LEVONORGESTREL AND ETHINYL ESTRADIOL 0.15-0.03
KIT ORAL
Qty: 84 TABLET | Refills: 2 | Status: SHIPPED | OUTPATIENT
Start: 2019-12-04 | End: 2020-01-20 | Stop reason: SDUPTHER

## 2020-01-20 ENCOUNTER — ANNUAL EXAM (OUTPATIENT)
Dept: OBGYN CLINIC | Age: 45
End: 2020-01-20
Payer: COMMERCIAL

## 2020-01-20 VITALS
HEIGHT: 69 IN | DIASTOLIC BLOOD PRESSURE: 62 MMHG | WEIGHT: 173 LBS | BODY MASS INDEX: 25.62 KG/M2 | SYSTOLIC BLOOD PRESSURE: 112 MMHG

## 2020-01-20 DIAGNOSIS — N92.0 MENORRHAGIA WITH REGULAR CYCLE: ICD-10-CM

## 2020-01-20 DIAGNOSIS — Z01.419 ENCOUNTER FOR ANNUAL ROUTINE GYNECOLOGICAL EXAMINATION: Primary | ICD-10-CM

## 2020-01-20 DIAGNOSIS — Z12.31 ENCOUNTER FOR SCREENING MAMMOGRAM FOR MALIGNANT NEOPLASM OF BREAST: ICD-10-CM

## 2020-01-20 DIAGNOSIS — Z30.41 ENCOUNTER FOR SURVEILLANCE OF CONTRACEPTIVE PILLS: ICD-10-CM

## 2020-01-20 PROCEDURE — S0612 ANNUAL GYNECOLOGICAL EXAMINA: HCPCS | Performed by: STUDENT IN AN ORGANIZED HEALTH CARE EDUCATION/TRAINING PROGRAM

## 2020-01-20 RX ORDER — LEVONORGESTREL AND ETHINYL ESTRADIOL 0.15-0.03
1 KIT ORAL DAILY
Qty: 84 TABLET | Refills: 4 | Status: SHIPPED | OUTPATIENT
Start: 2020-01-20 | End: 2021-01-20

## 2020-01-20 NOTE — PROGRESS NOTES
Ana Talbot Tai  1975    Assessment and Plan:  Yearly exam without abnormality      -Pap up to date, due   We reviewed AS[facility]P guidelines for Pap testing today  -Mammo slip provided   -Colonoscopy in , 10 year recall  -No contraindications to OCP use, discussed precautions and refilled x1 year    RTO one year for yearly exam or sooner as needed  CC:  Yearly exam    S:  38 y o  female here for yearly exam  She is doing well, family doing well, all healthy except for children with flu recently  Age of first period: 14years old    Lmp: 19  Birth control: vasectomy, [facility] Spotted OCP   Last pap: 1/15/19 NILM, HPV-    Patient is not a smoker  Patient drinks occassionally   Patient exercises and is actively trying to lose weight  Mammogram: 2019    Her cycles are regular, not heavy or crampy  She is very happy with the cycle control on Lillow    Sexual activity: She is sexually active without pain, bleeding or dryness  STD testing:  She does not want STD testing today  Gardasil:  She has not had the Gardasil series       Family hx of breast/ovarian/colon cancer: denies      Current Outpatient Medications:     fluticasone (FLONASE) 50 mcg/act nasal spray, 1 spray into each nostril daily, Disp: , Rfl:     LILLOW 0 15-30 MG-MCG per tablet, BEGIN WITH 1 TABLET BY MOUTH TWICE DAILY FOR 3 DAYS, THEN TAKE 1 TABLET BY MOUTH AT BEDTIME, Disp: 84 tablet, Rfl: 2    Multiple Vitamins-Iron (DAILY MULTIPLE VITAMIN/IRON) TABS, Take by mouth, Disp: , Rfl:     levonorgestrel-ethinyl estradiol (NORDETTE) 0 15-30 MG-MCG per tablet, Take 1 tablet by mouth daily Begin with 1 tablet BID for 3 days, and then take one tablet at HS (Patient not taking: Reported on 2020), Disp: 84 tablet, Rfl: 1  Social History     Socioeconomic History    Marital status: /Civil Union     Spouse name: Not on file    Number of children: Not on file    Years of education: Not on file    Highest education level: Not on file   Occupational History    Not on file   Social Needs    Financial resource strain: Not on file    Food insecurity:     Worry: Not on file     Inability: Not on file    Transportation needs:     Medical: Not on file     Non-medical: Not on file   Tobacco Use    Smoking status: Never Smoker    Smokeless tobacco: Never Used   Substance and Sexual Activity    Alcohol use: Yes     Comment: social    Drug use: No    Sexual activity: Yes     Birth control/protection: Male Sterilization, Pill   Lifestyle    Physical activity:     Days per week: Not on file     Minutes per session: Not on file    Stress: Not on file   Relationships    Social connections:     Talks on phone: Not on file     Gets together: Not on file     Attends L[employer] day service: Not on file     Active member of club or organization: Not on file     Attends meetings of clubs or organizations: Not on file     Relationship status: Not on file    Intimate partner violence:     Fear of current or ex partner: Not on file     Emotionally abused: Not on file     Physically abused: Not on file     Forced sexual activity: Not on file   Other Topics Concern    Not on file   Social History Narrative    1 cup tea/day- switched recently to decaf    Exercise - walking    Exercises moderately less than 3 times/wk     Family History   Problem Relation Age of Onset    Leukemia Father     Dementia Maternal Grandmother     Parkinsonism Maternal Grandmother     Stroke Maternal Grandmother     Lymphoma Maternal Grandmother     Esophageal cancer Family     No Known Problems Sister     No Known Problems Maternal Aunt     No Known Problems Paternal Aunt       Past Medical History:   Diagnosis Date    Inability to conceive, female         Review of Systems   Respiratory: Negative  Cardiovascular: Negative      Gastrointestinal: +diarrhea, chronic   Genitourinary: Negative for difficulty urinating, pelvic pain, vaginal bleeding, vaginal discharge, itching or odor  O:  Blood pressure 112/62, height 5' 9" (1 753 m), weight 78 5 kg (173 lb), last menstrual period 01/01/2020, not currently breastfeeding  Patient appears well and is not in distress  Neck is supple without masses  Breasts are symmetrical without mass, tenderness, nipple discharge, skin changes or adenopathy  Abdomen is soft and nontender without masses  External genitals are normal without lesions or rashes  Urethral meatus and urethra are normal  Bladder is normal to palpation  Vagina is normal without discharge or bleeding  Cervix is normal without discharge or lesion  Uterus is normal, mobile, nontender without palpable mass  Adnexa are normal, nontender, without palpable mass

## 2020-01-28 ENCOUNTER — TRANSCRIBE ORDERS (OUTPATIENT)
Dept: ADMINISTRATIVE | Facility: HOSPITAL | Age: 45
End: 2020-01-28

## 2020-01-28 DIAGNOSIS — Z12.31 SCREENING MAMMOGRAM FOR HIGH-RISK PATIENT: Primary | ICD-10-CM

## 2020-02-08 ENCOUNTER — OFFICE VISIT (OUTPATIENT)
Dept: URGENT CARE | Facility: MEDICAL CENTER | Age: 45
End: 2020-02-08
Payer: COMMERCIAL

## 2020-02-08 VITALS
RESPIRATION RATE: 18 BRPM | OXYGEN SATURATION: 99 % | HEIGHT: 70 IN | SYSTOLIC BLOOD PRESSURE: 120 MMHG | BODY MASS INDEX: 24.2 KG/M2 | TEMPERATURE: 97.9 F | DIASTOLIC BLOOD PRESSURE: 80 MMHG | WEIGHT: 169 LBS | HEART RATE: 88 BPM

## 2020-02-08 DIAGNOSIS — R68.89 FLU-LIKE SYMPTOMS: Primary | ICD-10-CM

## 2020-02-08 PROCEDURE — 99213 OFFICE O/P EST LOW 20 MIN: CPT | Performed by: PHYSICIAN ASSISTANT

## 2020-02-08 RX ORDER — OSELTAMIVIR PHOSPHATE 75 MG/1
75 CAPSULE ORAL 2 TIMES DAILY
Qty: 10 CAPSULE | Refills: 0 | Status: SHIPPED | OUTPATIENT
Start: 2020-02-08 | End: 2020-02-13

## 2020-02-08 RX ORDER — BROMPHENIRAMINE MALEATE, PSEUDOEPHEDRINE HYDROCHLORIDE, AND DEXTROMETHORPHAN HYDROBROMIDE 2; 30; 10 MG/5ML; MG/5ML; MG/5ML
5 SYRUP ORAL 4 TIMES DAILY PRN
Qty: 120 ML | Refills: 0 | Status: SHIPPED | OUTPATIENT
Start: 2020-02-08 | End: 2020-02-13

## 2020-02-08 NOTE — PATIENT INSTRUCTIONS
Flu like symptoms  tamiflu twice daily  bromfed as needed for cough  Follow up with PCP in 3-5 days  Proceed to  ER if symptoms worsen  Influenza   WHAT YOU NEED TO KNOW:   Influenza (the flu) is an infection caused by the influenza virus  The flu is easily spread when an infected person coughs, sneezes, or has close contact with others  You may be able to spread the flu to others for 1 week or longer after signs or symptoms appear  DISCHARGE INSTRUCTIONS:   Call 911 for any of the following:   · You have trouble breathing, and your lips look purple or blue  · You have a seizure  Return to the emergency department if:   · You are dizzy, or you are urinating less or not at all  · You have a headache with a stiff neck, and you feel tired or confused  · You have new pain or pressure in your chest     · Your symptoms, such as shortness of breath, vomiting, or diarrhea, get worse  · Your symptoms, such as fever and coughing, seem to get better, but then get worse  Contact your healthcare provider if:   · You have new muscle pain or weakness  · You have questions or concerns about your condition or care  Medicines: You may need any of the following:  · Acetaminophen  decreases pain and fever  It is available without a doctor's order  Ask how much to take and how often to take it  Follow directions  Acetaminophen can cause liver damage if not taken correctly  · NSAIDs , such as ibuprofen, help decrease swelling, pain, and fever  This medicine is available with or without a doctor's order  NSAIDs can cause stomach bleeding or kidney problems in certain people  If you take blood thinner medicine, always ask your healthcare provider if NSAIDs are safe for you  Always read the medicine label and follow directions  · Antivirals  help fight a viral infection  · Take your medicine as directed    Contact your healthcare provider if you think your medicine is not helping or if you have side effects  Tell him or her if you are allergic to any medicine  Keep a list of the medicines, vitamins, and herbs you take  Include the amounts, and when and why you take them  Bring the list or the pill bottles to follow-up visits  Carry your medicine list with you in case of an emergency  Rest  as much as you can to help you recover  Drink liquids as directed  to help prevent dehydration  Ask how much liquid to drink each day and which liquids are best for you  Prevent the spread of influenza:   · Wash your hands often  Use soap and water  Wash your hands after you use the bathroom, change a child's diapers, or sneeze  Wash your hands before you prepare or eat food  Use gel hand cleanser when soap and water are not available  Do not touch your eyes, nose, or mouth unless you have washed your hands first            · Cover your mouth when you sneeze or cough  Cough into a tissue or the bend of your arm  · Clean shared items with a germ-killing   Clean table surfaces, doorknobs, and light switches  Do not share towels, silverware, and dishes with people who are sick  Wash bed sheets, towels, silverware, and dishes with soap and water  · Wear a mask  over your mouth and nose if you are sick or are near anyone who is sick  · Stay away from others  if you are sick  · Influenza vaccine  helps prevent influenza (flu)  Everyone older than 6 months should get a yearly influenza vaccine  Get the vaccine as soon as it is available, usually in September or October each year  Follow up with your healthcare provider as directed:  Write down your questions so you remember to ask them during your visits  © 2017 [facility] Information is for End User's use only and may not be sold, redistributed or otherwise used for commercial purposes  All illustrations and images included in CareNotes® are the copyrighted property of [employer]  or [employer]    The above information is an  only  It is not intended as medical advice for individual conditions or treatments  Talk to your doctor, nurse or pharmacist before following any medical regimen to see if it is safe and effective for you

## 2020-02-08 NOTE — PROGRESS NOTES
[facility]        NAME: Rukhsana Munson is a 38 y o  female  : 1975    MRN: [id number]  DATE: 2020  TIME: 9:51 AM    Assessment and Plan   Flu-like symptoms [R68 89]  1  Flu-like symptoms  oseltamivir (TAMIFLU) 75 mg capsule    brompheniramine-pseudoephedrine-DM 30-2-10 MG/5ML syrup         Patient Instructions     Flu like symptoms  tamiflu twice daily  bromfed as needed for cough  Follow up with PCP in 3-5 days  Proceed to  ER if symptoms worsen  Chief Complaint     Chief Complaint   Patient presents with    Cold Like Symptoms     Pt  with flu-loke symptoms for the past two days  T-max 100 3         History of Present Illness       39 y/o female presents c/o non productive cough, fever (Tmax 102), generalized body aches, states it feels like the flu  Denies chest pain, SOB      Review of Systems   Review of Systems   Constitutional: Positive for fever  Negative for activity change, appetite change, chills, diaphoresis and fatigue  HENT: Positive for congestion, rhinorrhea and sore throat  Negative for ear discharge, ear pain, facial swelling, hearing loss, mouth sores, nosebleeds, postnasal drip, sinus pressure, sinus pain, sneezing and voice change  Respiratory: Positive for cough  Negative for apnea, choking, chest tightness, shortness of breath, wheezing and stridor  Cardiovascular: Negative            Current Medications       Current Outpatient Medications:     fluticasone (FLONASE) 50 mcg/act nasal spray, 1 spray into each nostril daily, Disp: , Rfl:     levonorgestrel-ethinyl estradiol (NORDETTE) 0 15-30 MG-MCG per tablet, Take 1 tablet by mouth daily, Disp: 84 tablet, Rfl: 4    Multiple Vitamins-Iron (DAILY MULTIPLE VITAMIN/IRON) TABS, Take by mouth, Disp: , Rfl:     brompheniramine-pseudoephedrine-DM 30-2-10 MG/5ML syrup, Take 5 mL by mouth 4 (four) times a day as needed for cough for up to 5 days, Disp: 120 mL, Rfl: 0    oseltamivir (TAMIFLU) 75 mg capsule, Take 1 capsule (75 mg total) by mouth 2 (two) times a day for 5 days, Disp: 10 capsule, Rfl: 0    Current Allergies     Allergies as of 2020 - Reviewed 2020   Allergen Reaction Noted    Mold extract [trichophyton] Itching and Sneezing 09/10/2013    Pollen extract Itching and Sneezing 09/10/2013            The following portions of the patient's history were reviewed and updated as appropriate: allergies, current medications, past family history, past medical history, past social history, past surgical history and problem list      Past Medical History:   Diagnosis Date    Inability to conceive, female        Past Surgical History:   Procedure Laterality Date     SECTION      FOOT SURGERY      TOOTH EXTRACTION         Family History   Problem Relation Age of Onset    Leukemia Father     Dementia Maternal Grandmother     Parkinsonism Maternal Grandmother     Stroke Maternal Grandmother     Lymphoma Maternal Grandmother     Esophageal cancer Family     No Known Problems Sister     No Known Problems Maternal Aunt     No Known Problems Paternal Aunt          Medications have been verified  Objective   /80   Pulse 88   Temp 97 9 °F (36 6 °C) (Temporal)   Resp 18   Ht 5' 10" (1 778 m)   Wt 76 7 kg (169 lb)   SpO2 99%   BMI 24 25 kg/m²        Physical Exam     Physical Exam   Constitutional: She appears well-developed and well-nourished  No distress  HENT:   Head: Normocephalic and atraumatic  Right Ear: Hearing, tympanic membrane, external ear and ear canal normal    Left Ear: Hearing, tympanic membrane, external ear and ear canal normal    Nose: Rhinorrhea present  Mouth/Throat: Uvula is midline, oropharynx is clear and moist and mucous membranes are normal    Neck: Normal range of motion  Neck supple  Cardiovascular: Normal rate, regular rhythm, normal heart sounds and intact distal pulses     Pulmonary/Chest: Effort normal and breath sounds normal  Lymphadenopathy:     She has cervical adenopathy  Skin: She is not diaphoretic

## 2020-06-08 ENCOUNTER — HOSPITAL ENCOUNTER (OUTPATIENT)
Dept: MAMMOGRAPHY | Facility: CLINIC | Age: 45
Discharge: HOME | End: 2020-06-08
Payer: COMMERCIAL

## 2020-06-08 VITALS — BODY MASS INDEX: 24.2 KG/M2 | HEIGHT: 70 IN | WEIGHT: 169 LBS

## 2020-06-08 DIAGNOSIS — Z12.31 SCREENING MAMMOGRAM FOR HIGH-RISK PATIENT: ICD-10-CM

## 2020-06-08 PROCEDURE — 77063 BREAST TOMOSYNTHESIS BI: CPT

## 2020-06-08 PROCEDURE — 77067 SCR MAMMO BI INCL CAD: CPT

## 2021-01-20 DIAGNOSIS — N92.0 MENORRHAGIA WITH REGULAR CYCLE: ICD-10-CM

## 2021-01-20 DIAGNOSIS — Z30.41 ENCOUNTER FOR SURVEILLANCE OF CONTRACEPTIVE PILLS: ICD-10-CM

## 2021-01-20 RX ORDER — LEVONORGESTREL AND ETHINYL ESTRADIOL 0.15-0.03
KIT ORAL
Qty: 84 TABLET | Refills: 4 | Status: SHIPPED | OUTPATIENT
Start: 2021-01-20 | End: 2022-01-31 | Stop reason: SDUPTHER

## 2021-01-26 ENCOUNTER — ANNUAL EXAM (OUTPATIENT)
Dept: OBGYN CLINIC | Age: 46
End: 2021-01-26
Payer: COMMERCIAL

## 2021-01-26 VITALS — SYSTOLIC BLOOD PRESSURE: 120 MMHG | BODY MASS INDEX: 24.77 KG/M2 | WEIGHT: 172.6 LBS | DIASTOLIC BLOOD PRESSURE: 78 MMHG

## 2021-01-26 DIAGNOSIS — Z01.419 ROUTINE GYNECOLOGICAL EXAMINATION: Primary | ICD-10-CM

## 2021-01-26 DIAGNOSIS — Z12.31 ENCOUNTER FOR SCREENING MAMMOGRAM FOR MALIGNANT NEOPLASM OF BREAST: ICD-10-CM

## 2021-01-26 PROCEDURE — S0612 ANNUAL GYNECOLOGICAL EXAMINA: HCPCS | Performed by: STUDENT IN AN ORGANIZED HEALTH CARE EDUCATION/TRAINING PROGRAM

## 2021-01-26 NOTE — PROGRESS NOTES
Ana Truong Litter  1975    Assessment and Plan:  Yearly exam without abnormality      -Pap up to date, due   -Mammo slip provided   -No contraindications to OCP use  -Decreased libido: discussed possible contribution of OCPs (increased SHBG and decreased Testosterone) and recommendation for trial off OCPs, as  is s/p vasectomy  If libido improved, can manage heavy menstrual bleeding with different modality, such as IUD or ablation    RTO one year for yearly exam or sooner as needed  CC:  Yearly exam    S:  54 y o  female here for yearly exam      Menarche: 14years old    Patient's last menstrual period was 2020 (exact date)  Contraception: altavera  Last Pap: 2019 NILM/HPV-  Last Mammo: 2020 BIRADS1  Last Colonoscopy:  w/ 10 year recall    Non-smoker, social drinker  Exercises when possible     Doing well overall  Has 9yo (daughter) and 11yo (son) at home doing virtual education  Also working  Denies hot flushes, dyspareunia, abnormal uterine bleeding, urinary/fecal incontinence, changes in energy levels, mood  She denies pain, bleeding or dryness with intercourse, but does notice decreased libido  Is unsure if this is secondary to age, stress  Relationship with  strong  Her cycles are regular, not heavy or crampy on OCP for this indication  STD testing:  She does not want STD testing today       Family hx of breast/ovarian/colon cancer: denies      Current Outpatient Medications:     Altavera 0 15-30 MG-MCG per tablet, TAKE 1 TABLET BY MOUTH EVERY DAY, Disp: 84 tablet, Rfl: 4    fluticasone (FLONASE) 50 mcg/act nasal spray, 1 spray into each nostril daily, Disp: , Rfl:     Multiple Vitamins-Iron (DAILY MULTIPLE VITAMIN/IRON) TABS, Take by mouth, Disp: , Rfl:   Social History     Socioeconomic History    Marital status: /Civil Union     Spouse name: Not on file    Number of children: Not on file    Years of education: Not on file    Highest education level: Not on file   Occupational History    Not on file   Social Needs    Financial resource strain: Not on file    Food insecurity     Worry: Not on file     Inability: Not on file    Transportation needs     Medical: Not on file     Non-medical: Not on file   Tobacco Use    Smoking status: Never Smoker    Smokeless tobacco: Never Used   Substance and Sexual Activity    Alcohol use: Yes     Comment: social    Drug use: No    Sexual activity: Yes     Birth control/protection: Male Sterilization, Pill   Lifestyle    Physical activity     Days per week: Not on file     Minutes per session: Not on file    Stress: Not on file   Relationships    Social connections     Talks on phone: Not on file     Gets together: Not on file     Attends Alevism service: Not on file     Active member of club or organization: Not on file     Attends meetings of clubs or organizations: Not on file     Relationship status: Not on file    Intimate partner violence     Fear of current or ex partner: Not on file     Emotionally abused: Not on file     Physically abused: Not on file     Forced sexual activity: Not on file   Other Topics Concern    Not on file   Social History Narrative    1 cup tea/day- switched recently to decaf    Exercise - walking    Exercises moderately less than 3 times/wk     Family History   Problem Relation Age of Onset    Leukemia Father     Dementia Maternal Grandmother     Parkinsonism Maternal Grandmother     Stroke Maternal Grandmother     Lymphoma Maternal Grandmother     Esophageal cancer Family     No Known Problems Sister     No Known Problems Maternal Aunt     No Known Problems Paternal Aunt     No Known Problems Daughter       Past Medical History:   Diagnosis Date    Inability to conceive, female         Review of Systems   Respiratory: Negative  Cardiovascular: Negative      Gastrointestinal: +diarrhea  Genitourinary: Negative for difficulty urinating, pelvic pain, vaginal bleeding, vaginal discharge, itching or odor  O:  Blood pressure 120/78, weight 78 3 kg (172 lb 9 6 oz), last menstrual period 12/28/2020, not currently breastfeeding  Patient appears well and is not in distress  Neck is supple without masses  Breasts are symmetrical without mass, tenderness, nipple discharge, skin changes or adenopathy  Abdomen is soft and nontender without masses  External genitals are normal without lesions or rashes  No atrophy noted  Urethral meatus and urethra are normal  Bladder is normal to palpation  Vagina is normal without discharge or bleeding  Cervix is normal without discharge or lesion  Uterus is normal, mobile, nontender without palpable mass  Adnexa are normal, nontender, without palpable mass     Rectum normal in appearance

## 2021-04-05 ENCOUNTER — OFFICE VISIT (OUTPATIENT)
Dept: FAMILY MEDICINE CLINIC | Facility: CLINIC | Age: 46
End: 2021-04-05
Payer: COMMERCIAL

## 2021-04-05 VITALS
TEMPERATURE: 98.3 F | HEIGHT: 70 IN | OXYGEN SATURATION: 100 % | DIASTOLIC BLOOD PRESSURE: 68 MMHG | RESPIRATION RATE: 16 BRPM | WEIGHT: 168 LBS | BODY MASS INDEX: 24.05 KG/M2 | HEART RATE: 77 BPM | SYSTOLIC BLOOD PRESSURE: 112 MMHG

## 2021-04-05 DIAGNOSIS — F41.1 GENERALIZED ANXIETY DISORDER: ICD-10-CM

## 2021-04-05 DIAGNOSIS — Z00.00 ANNUAL PHYSICAL EXAM: Primary | ICD-10-CM

## 2021-04-05 PROBLEM — N76.0 ACUTE VAGINITIS: Status: RESOLVED | Noted: 2019-06-18 | Resolved: 2021-04-05

## 2021-04-05 PROBLEM — Z01.419 ENCOUNTER FOR ANNUAL ROUTINE GYNECOLOGICAL EXAMINATION: Status: RESOLVED | Noted: 2020-01-20 | Resolved: 2021-04-05

## 2021-04-05 PROCEDURE — 99396 PREV VISIT EST AGE 40-64: CPT | Performed by: NURSE PRACTITIONER

## 2021-04-05 PROCEDURE — 1036F TOBACCO NON-USER: CPT | Performed by: NURSE PRACTITIONER

## 2021-04-05 PROCEDURE — 3725F SCREEN DEPRESSION PERFORMED: CPT | Performed by: NURSE PRACTITIONER

## 2021-04-05 PROCEDURE — 3008F BODY MASS INDEX DOCD: CPT | Performed by: NURSE PRACTITIONER

## 2021-04-05 RX ORDER — BUSPIRONE HYDROCHLORIDE 5 MG/1
5 TABLET ORAL 3 TIMES DAILY
Qty: 90 TABLET | Refills: 0 | Status: SHIPPED | OUTPATIENT
Start: 2021-04-05 | End: 2021-04-27

## 2021-04-05 NOTE — PATIENT INSTRUCTIONS

## 2021-04-05 NOTE — ASSESSMENT & PLAN NOTE
GRANT-7=19  To begin buspirone 5 mg every 8 hours  Will refer patient to begin counseling with our  Ignacia Rosales  Counseled the importance of limiting caffeine and engaging in daily physical activity  Follow-up in 1 month or sooner if symptoms worsen

## 2021-04-06 ENCOUNTER — APPOINTMENT (OUTPATIENT)
Dept: LAB | Facility: HOSPITAL | Age: 46
End: 2021-04-06
Payer: COMMERCIAL

## 2021-04-06 LAB
ALBUMIN SERPL BCP-MCNC: 3.5 G/DL (ref 3.5–5)
ALP SERPL-CCNC: 87 U/L (ref 46–116)
ALT SERPL W P-5'-P-CCNC: 25 U/L (ref 12–78)
ANION GAP SERPL CALCULATED.3IONS-SCNC: 9 MMOL/L (ref 4–13)
AST SERPL W P-5'-P-CCNC: 19 U/L (ref 5–45)
BASOPHILS # BLD AUTO: 0.03 THOUSANDS/ΜL (ref 0–0.1)
BASOPHILS NFR BLD AUTO: 1 % (ref 0–1)
BILIRUB SERPL-MCNC: 0.53 MG/DL (ref 0.2–1)
BUN SERPL-MCNC: 19 MG/DL (ref 5–25)
CALCIUM SERPL-MCNC: 8.7 MG/DL (ref 8.3–10.1)
CHLORIDE SERPL-SCNC: 106 MMOL/L (ref 100–108)
CHOLEST SERPL-MCNC: 186 MG/DL (ref 50–200)
CO2 SERPL-SCNC: 27 MMOL/L (ref 21–32)
CREAT SERPL-MCNC: 0.75 MG/DL (ref 0.6–1.3)
EOSINOPHIL # BLD AUTO: 0.06 THOUSAND/ΜL (ref 0–0.61)
EOSINOPHIL NFR BLD AUTO: 1 % (ref 0–6)
ERYTHROCYTE [DISTWIDTH] IN BLOOD BY AUTOMATED COUNT: 12.4 % (ref 11.6–15.1)
GFR SERPL CREATININE-BSD FRML MDRD: 96 ML/MIN/1.73SQ M
GLUCOSE P FAST SERPL-MCNC: 95 MG/DL (ref 65–99)
HCT VFR BLD AUTO: 39.8 % (ref 34.8–46.1)
HDLC SERPL-MCNC: 60 MG/DL
HGB BLD-MCNC: 12.9 G/DL (ref 11.5–15.4)
IMM GRANULOCYTES # BLD AUTO: 0.01 THOUSAND/UL (ref 0–0.2)
IMM GRANULOCYTES NFR BLD AUTO: 0 % (ref 0–2)
LDLC SERPL CALC-MCNC: 112 MG/DL (ref 0–100)
LYMPHOCYTES # BLD AUTO: 2.03 THOUSANDS/ΜL (ref 0.6–4.47)
LYMPHOCYTES NFR BLD AUTO: 39 % (ref 14–44)
MCH RBC QN AUTO: 31.5 PG (ref 26.8–34.3)
MCHC RBC AUTO-ENTMCNC: 32.4 G/DL (ref 31.4–37.4)
MCV RBC AUTO: 97 FL (ref 82–98)
MONOCYTES # BLD AUTO: 0.41 THOUSAND/ΜL (ref 0.17–1.22)
MONOCYTES NFR BLD AUTO: 8 % (ref 4–12)
NEUTROPHILS # BLD AUTO: 2.66 THOUSANDS/ΜL (ref 1.85–7.62)
NEUTS SEG NFR BLD AUTO: 51 % (ref 43–75)
NRBC BLD AUTO-RTO: 0 /100 WBCS
PLATELET # BLD AUTO: 236 THOUSANDS/UL (ref 149–390)
PMV BLD AUTO: 11.6 FL (ref 8.9–12.7)
POTASSIUM SERPL-SCNC: 3.9 MMOL/L (ref 3.5–5.3)
PROT SERPL-MCNC: 6.8 G/DL (ref 6.4–8.2)
RBC # BLD AUTO: 4.09 MILLION/UL (ref 3.81–5.12)
SODIUM SERPL-SCNC: 142 MMOL/L (ref 136–145)
TRIGL SERPL-MCNC: 68 MG/DL
TSH SERPL DL<=0.05 MIU/L-ACNC: 3.59 UIU/ML (ref 0.36–3.74)
WBC # BLD AUTO: 5.2 THOUSAND/UL (ref 4.31–10.16)

## 2021-04-06 PROCEDURE — 84443 ASSAY THYROID STIM HORMONE: CPT

## 2021-04-06 PROCEDURE — 80053 COMPREHEN METABOLIC PANEL: CPT

## 2021-04-06 PROCEDURE — 85025 COMPLETE CBC W/AUTO DIFF WBC: CPT

## 2021-04-06 PROCEDURE — 36415 COLL VENOUS BLD VENIPUNCTURE: CPT

## 2021-04-06 PROCEDURE — 80061 LIPID PANEL: CPT

## 2021-04-13 DIAGNOSIS — Z23 ENCOUNTER FOR IMMUNIZATION: ICD-10-CM

## 2021-04-21 ENCOUNTER — SOCIAL WORK (OUTPATIENT)
Dept: BEHAVIORAL/MENTAL HEALTH CLINIC | Facility: CLINIC | Age: 46
End: 2021-04-21
Payer: COMMERCIAL

## 2021-04-21 DIAGNOSIS — F41.1 GENERALIZED ANXIETY DISORDER: Primary | ICD-10-CM

## 2021-04-21 PROCEDURE — 90834 PSYTX W PT 45 MINUTES: CPT | Performed by: SOCIAL WORKER

## 2021-04-21 NOTE — PSYCH
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 which can be demanding  Pt reports having physical activity in her life as a helpful coping skill  Pt shared that she feels as if she has always had anxiety and has never seen anything "wrong" with it because she gets a lot of things done  However, since [place] and a few other external events that have occurred recently her family suggested that she go to the doctor and talk more about what is going on  Pt shared that that her PCP recommended she go to therapy to help with her racing thoughts as well as to potentially help with sleep disturbances  Pt shared she tends to only get 5 hours of sleep and never feels well rested  Pt shared that she always feels like there is something to do and even when she is watching tv she has a hard time "turning it off "  Therapist and pt discussed how pt has been used to always being on the go and that is also how her family is  Therapist and pt discussed helpful strategies pt can begin to use to help work on slowing her thoughts down and work on being more mindful  Patient ID: Noreen San is a 54 y o  female  HPI 45 minutes    Review of Systems      Objective: Pt appeared to be anxious yet easily engaged  Pt seemed to minimize her feelings by generalizing/intellectualizing often         Physical Exam  Pt denied any SI/HI/AH/VH

## 2021-04-23 ENCOUNTER — IMMUNIZATIONS (OUTPATIENT)
Dept: FAMILY MEDICINE CLINIC | Facility: HOSPITAL | Age: 46
End: 2021-04-23

## 2021-04-23 DIAGNOSIS — Z23 ENCOUNTER FOR IMMUNIZATION: Primary | ICD-10-CM

## 2021-04-23 PROCEDURE — 0001A SARS-COV-2 / COVID-19 MRNA VACCINE (PFIZER-BIONTECH) 30 MCG: CPT

## 2021-04-23 PROCEDURE — 91300 SARS-COV-2 / COVID-19 MRNA VACCINE (PFIZER-BIONTECH) 30 MCG: CPT

## 2021-04-27 DIAGNOSIS — F41.1 GENERALIZED ANXIETY DISORDER: ICD-10-CM

## 2021-04-27 RX ORDER — BUSPIRONE HYDROCHLORIDE 5 MG/1
TABLET ORAL
Qty: 90 TABLET | Refills: 0 | Status: SHIPPED | OUTPATIENT
Start: 2021-04-27 | End: 2021-05-06 | Stop reason: DRUGHIGH

## 2021-05-06 ENCOUNTER — OFFICE VISIT (OUTPATIENT)
Dept: FAMILY MEDICINE CLINIC | Facility: CLINIC | Age: 46
End: 2021-05-06
Payer: COMMERCIAL

## 2021-05-06 VITALS
OXYGEN SATURATION: 100 % | DIASTOLIC BLOOD PRESSURE: 60 MMHG | BODY MASS INDEX: 24.34 KG/M2 | HEART RATE: 68 BPM | HEIGHT: 70 IN | SYSTOLIC BLOOD PRESSURE: 100 MMHG | RESPIRATION RATE: 16 BRPM | TEMPERATURE: 98.8 F | WEIGHT: 170 LBS

## 2021-05-06 DIAGNOSIS — F41.1 GENERALIZED ANXIETY DISORDER: Primary | ICD-10-CM

## 2021-05-06 PROBLEM — Z00.00 ANNUAL PHYSICAL EXAM: Status: RESOLVED | Noted: 2021-04-05 | Resolved: 2021-05-06

## 2021-05-06 PROCEDURE — 1036F TOBACCO NON-USER: CPT | Performed by: NURSE PRACTITIONER

## 2021-05-06 PROCEDURE — 3008F BODY MASS INDEX DOCD: CPT | Performed by: NURSE PRACTITIONER

## 2021-05-06 PROCEDURE — 99213 OFFICE O/P EST LOW 20 MIN: CPT | Performed by: NURSE PRACTITIONER

## 2021-05-06 RX ORDER — BUSPIRONE HYDROCHLORIDE 10 MG/1
10 TABLET ORAL 3 TIMES DAILY
Qty: 90 TABLET | Refills: 3 | Status: SHIPPED | OUTPATIENT
Start: 2021-05-06 | End: 2021-07-29

## 2021-05-06 NOTE — PROGRESS NOTES
Assessment/Plan:    Generalized anxiety disorder  Patient continues to feel anxious and on edge  To increase buspirone to 10 mg every 8 hours  To continue meditation, deep breathing, and yoga  To continue counseling  Advised to follow-up in 2 weeks if symptoms have not improved via telephone call or sooner if symptoms worsen  Otherwise, follow-up in 4 months  Diagnoses and all orders for this visit:    Generalized anxiety disorder  -     busPIRone (BUSPAR) 10 mg tablet; Take 1 tablet (10 mg total) by mouth 3 (three) times a day          Subjective:      Patient ID: Francia Calhoun is a 54 y o  female  Ana presents for a follow-up  A month ago, she was started on buspirone 5 mg t i d  She continues to feel anxious, on edge, worrying frequently, and having difficulty sleeping  Prior to an inmportant meeting for work, she took 10 mg of buspirone and noted a significant improvement in her anxiety  She begin counseling with our , Sam Sanches, and has started deep breathing, yoga, and meditation which she finds helpful  The following portions of the patient's history were reviewed and updated as appropriate:   She   Patient Active Problem List    Diagnosis Date Noted    Generalized anxiety disorder 04/05/2021    Dense breast 01/15/2019    Intermittent constipation 05/24/2018     Current Outpatient Medications   Medication Sig Dispense Refill    Altavera 0 15-30 MG-MCG per tablet TAKE 1 TABLET BY MOUTH EVERY DAY 84 tablet 4    fluticasone (FLONASE) 50 mcg/act nasal spray 1 spray into each nostril daily      Multiple Vitamins-Iron (DAILY MULTIPLE VITAMIN/IRON) TABS Take by mouth      Probiotic Product (PROBIOTIC-10 PO) Take by mouth      busPIRone (BUSPAR) 10 mg tablet Take 1 tablet (10 mg total) by mouth 3 (three) times a day 90 tablet 3     No current facility-administered medications for this visit        She is allergic to mold extract [trichophyton] and pollen extract       Review of Systems   Constitutional: Negative  Respiratory: Negative  Cardiovascular: Negative  Gastrointestinal: Negative  Neurological: Negative  Psychiatric/Behavioral: Positive for sleep disturbance  The patient is nervous/anxious  /60   Pulse 68   Temp 98 8 °F (37 1 °C) (Tympanic)   Resp 16   Ht 5' 10" (1 778 m)   Wt 77 1 kg (170 lb)   LMP 04/26/2021 (Exact Date)   SpO2 100%   BMI 24 39 kg/m²     Objective:     Physical Exam  Vitals signs and nursing note reviewed  Constitutional:       Appearance: She is well-developed  HENT:      Head: Normocephalic and atraumatic  Eyes:      Conjunctiva/sclera: Conjunctivae normal    Neck:      Musculoskeletal: Neck supple  Cardiovascular:      Rate and Rhythm: Normal rate and regular rhythm  Heart sounds: Normal heart sounds  No murmur  Pulmonary:      Effort: Pulmonary effort is normal  No respiratory distress  Breath sounds: Normal breath sounds  No wheezing or rales  Chest:      Chest wall: No tenderness  Neurological:      Mental Status: She is alert and oriented to person, place, and time  Psychiatric:         Attention and Perception: Attention normal          Mood and Affect: Mood is anxious  Speech: Speech is rapid and pressured  Behavior: Behavior normal  Behavior is cooperative  Thought Content:  Thought content normal          Cognition and Memory: Cognition normal          Judgment: Judgment normal

## 2021-05-06 NOTE — ASSESSMENT & PLAN NOTE
Patient continues to feel anxious and on edge  To increase buspirone to 10 mg every 8 hours  To continue meditation, deep breathing, and yoga  To continue counseling  Advised to follow-up in 2 weeks if symptoms have not improved via telephone call or sooner if symptoms worsen  Otherwise, follow-up in 4 months

## 2021-05-11 ENCOUNTER — SOCIAL WORK (OUTPATIENT)
Dept: BEHAVIORAL/MENTAL HEALTH CLINIC | Facility: CLINIC | Age: 46
End: 2021-05-11
Payer: COMMERCIAL

## 2021-05-11 DIAGNOSIS — F41.1 GENERALIZED ANXIETY DISORDER: Primary | ICD-10-CM

## 2021-05-11 PROCEDURE — 90832 PSYTX W PT 30 MINUTES: CPT | Performed by: SOCIAL WORKER

## 2021-05-11 NOTE — PSYCH
8: 02am-8:29am    Assessment/Plan: Pt will call to schedule a f/u appointment if symptoms increase     There are no diagnoses linked to this encounter  Subjective: Therapist met w/pt for individual session  Pt stated that she has begun to engage in different self care activities such as meditation and yoga  She shared that she feels that those have helped increase her self awareness and overall pt feels calmer  She stated that she feels she has a better balance in her daily routine and doesn't feel as stressed/overwhelmed  Pt stated that in terms of work she doesn't have any deadlines until later this year and her children are going to be ending school as well which has been a big stressor for her  Therapist and pt discussed how pt has a lot of coping skills and a lot of support and due to pt's progress it doesn't appear that she needs ongoing tx at this time  However, pt was advised if symptoms do begin to increase she can always return back  Patient ID: Rosio Valdes is a 54 y o  female  HPI 30 minutes    Review of Systems      Objective: pt appeared calm and was easily engaged         Physical Exam  Pt denied any SI/HI/Ah/VH

## 2021-05-17 ENCOUNTER — IMMUNIZATIONS (OUTPATIENT)
Dept: FAMILY MEDICINE CLINIC | Facility: HOSPITAL | Age: 46
End: 2021-05-17

## 2021-05-17 DIAGNOSIS — Z23 ENCOUNTER FOR IMMUNIZATION: Primary | ICD-10-CM

## 2021-05-17 PROCEDURE — 0002A SARS-COV-2 / COVID-19 MRNA VACCINE (PFIZER-BIONTECH) 30 MCG: CPT

## 2021-05-17 PROCEDURE — 91300 SARS-COV-2 / COVID-19 MRNA VACCINE (PFIZER-BIONTECH) 30 MCG: CPT

## 2021-07-20 ENCOUNTER — HOSPITAL ENCOUNTER (OUTPATIENT)
Dept: MAMMOGRAPHY | Facility: CLINIC | Age: 46
Discharge: HOME | End: 2021-07-20
Payer: COMMERCIAL

## 2021-07-20 VITALS — HEIGHT: 70 IN | WEIGHT: 170 LBS | BODY MASS INDEX: 24.34 KG/M2

## 2021-07-20 DIAGNOSIS — Z12.31 ENCOUNTER FOR SCREENING MAMMOGRAM FOR MALIGNANT NEOPLASM OF BREAST: ICD-10-CM

## 2021-07-20 PROCEDURE — 77063 BREAST TOMOSYNTHESIS BI: CPT

## 2021-07-20 PROCEDURE — 77067 SCR MAMMO BI INCL CAD: CPT

## 2021-07-28 DIAGNOSIS — F41.1 GENERALIZED ANXIETY DISORDER: ICD-10-CM

## 2021-07-29 RX ORDER — BUSPIRONE HYDROCHLORIDE 10 MG/1
TABLET ORAL
Qty: 270 TABLET | Refills: 1 | Status: SHIPPED | OUTPATIENT
Start: 2021-07-29 | End: 2022-02-15

## 2021-09-13 ENCOUNTER — OFFICE VISIT (OUTPATIENT)
Dept: FAMILY MEDICINE CLINIC | Facility: CLINIC | Age: 46
End: 2021-09-13
Payer: COMMERCIAL

## 2021-09-13 VITALS
DIASTOLIC BLOOD PRESSURE: 62 MMHG | HEIGHT: 70 IN | TEMPERATURE: 98.8 F | WEIGHT: 173 LBS | SYSTOLIC BLOOD PRESSURE: 114 MMHG | OXYGEN SATURATION: 98 % | BODY MASS INDEX: 24.77 KG/M2 | HEART RATE: 87 BPM

## 2021-09-13 DIAGNOSIS — Z23 IMMUNIZATION DUE: Primary | ICD-10-CM

## 2021-09-13 DIAGNOSIS — G47.00 INSOMNIA, UNSPECIFIED TYPE: ICD-10-CM

## 2021-09-13 DIAGNOSIS — F41.1 GENERALIZED ANXIETY DISORDER: ICD-10-CM

## 2021-09-13 PROCEDURE — 90471 IMMUNIZATION ADMIN: CPT

## 2021-09-13 PROCEDURE — 3008F BODY MASS INDEX DOCD: CPT | Performed by: NURSE PRACTITIONER

## 2021-09-13 PROCEDURE — 90682 RIV4 VACC RECOMBINANT DNA IM: CPT

## 2021-09-13 PROCEDURE — 1036F TOBACCO NON-USER: CPT | Performed by: NURSE PRACTITIONER

## 2021-09-13 PROCEDURE — 99214 OFFICE O/P EST MOD 30 MIN: CPT | Performed by: NURSE PRACTITIONER

## 2021-09-13 RX ORDER — TRAZODONE HYDROCHLORIDE 50 MG/1
50 TABLET ORAL
Qty: 10 TABLET | Refills: 0 | Status: SHIPPED | OUTPATIENT
Start: 2021-09-13 | End: 2022-03-14 | Stop reason: SDUPTHER

## 2021-09-13 NOTE — ASSESSMENT & PLAN NOTE
Stable  To continue buspirone 10 mg every 8 hours  Encouraged patient to begin counseling  Counseled the importance of limiting caffeine and beginning daily physical activity  Follow-up in 6 months or sooner if needed

## 2021-09-13 NOTE — ASSESSMENT & PLAN NOTE
Counseled on proper sleep hygiene  To begin trazodone 50 mg as needed for sleep  Follow-up in 1 month if no improvement in symptoms

## 2021-09-13 NOTE — PROGRESS NOTES
Assessment/Plan:    Generalized anxiety disorder  Stable  To continue buspirone 10 mg every 8 hours  Encouraged patient to begin counseling  Counseled the importance of limiting caffeine and beginning daily physical activity  Follow-up in 6 months or sooner if needed  Insomnia    Counseled on proper sleep hygiene  To begin trazodone 50 mg as needed for sleep  Follow-up in 1 month if no improvement in symptoms  Diagnoses and all orders for this visit:    Insomnia, unspecified type  -     traZODone (DESYREL) 50 mg tablet; Take 1 tablet (50 mg total) by mouth daily at bedtime as needed for sleep    Generalized anxiety disorder          Subjective:      Patient ID: Beba Trujillo is a 54 y o  female  Ana presents for a follow-up  In regards to her anxiety, she takes her buspirone 10 mg every 8 hours  She feels as though this is helpful  She has difficulty staying asleep at times  Typically, she will fall asleep around 9:00 p m  and then wake up around 1 or 2:00 a m  It will take her a couple hours to fall back asleep  She tries to drink a limited amount of caffeine        The following portions of the patient's history were reviewed and updated as appropriate: She   Patient Active Problem List    Diagnosis Date Noted    Insomnia 09/13/2021    Generalized anxiety disorder 04/05/2021    Dense breast 01/15/2019    Intermittent constipation 05/24/2018     Current Outpatient Medications   Medication Sig Dispense Refill    Altavera 0 15-30 MG-MCG per tablet TAKE 1 TABLET BY MOUTH EVERY DAY 84 tablet 4    busPIRone (BUSPAR) 10 mg tablet TAKE 1 TABLET BY MOUTH THREE TIMES A  tablet 1    fluticasone (FLONASE) 50 mcg/act nasal spray 1 spray into each nostril daily      Multiple Vitamins-Iron (DAILY MULTIPLE VITAMIN/IRON) TABS Take by mouth      Probiotic Product (PROBIOTIC-10 PO) Take by mouth      traZODone (DESYREL) 50 mg tablet Take 1 tablet (50 mg total) by mouth daily at bedtime as needed for sleep 10 tablet 0     No current facility-administered medications for this visit  She is allergic to mold extract [trichophyton] and pollen extract       Review of Systems   Constitutional: Negative  Respiratory: Negative  Cardiovascular: Negative  Gastrointestinal: Negative  Musculoskeletal: Negative  Psychiatric/Behavioral: Positive for sleep disturbance  The patient is nervous/anxious (occasionally)  /62   Pulse 87   Temp 98 8 °F (37 1 °C)   Ht 5' 10" (1 778 m)   Wt 78 5 kg (173 lb)   LMP 08/17/2021 (Exact Date)   SpO2 98%   BMI 24 82 kg/m²     Objective:     Physical Exam  Vitals and nursing note reviewed  Constitutional:       General: She is not in acute distress  Appearance: Normal appearance  She is well-developed  She is not ill-appearing, toxic-appearing or diaphoretic  HENT:      Head: Normocephalic and atraumatic  Eyes:      Conjunctiva/sclera: Conjunctivae normal    Cardiovascular:      Rate and Rhythm: Normal rate and regular rhythm  Heart sounds: Normal heart sounds  No murmur heard  Pulmonary:      Effort: Pulmonary effort is normal  No respiratory distress  Breath sounds: Normal breath sounds  No wheezing or rales  Chest:      Chest wall: No tenderness  Musculoskeletal:      Cervical back: Neck supple  Neurological:      General: No focal deficit present  Mental Status: She is alert and oriented to person, place, and time  Psychiatric:         Mood and Affect: Mood normal          Behavior: Behavior normal          Thought Content:  Thought content normal          Judgment: Judgment normal

## 2021-12-10 ENCOUNTER — IMMUNIZATIONS (OUTPATIENT)
Dept: FAMILY MEDICINE CLINIC | Facility: HOSPITAL | Age: 46
End: 2021-12-10

## 2021-12-10 DIAGNOSIS — Z23 ENCOUNTER FOR IMMUNIZATION: Primary | ICD-10-CM

## 2021-12-10 PROCEDURE — 91300 COVID-19 PFIZER VACC 0.3 ML: CPT

## 2021-12-10 PROCEDURE — 0001A COVID-19 PFIZER VACC 0.3 ML: CPT

## 2022-01-01 ENCOUNTER — AMB VIDEO VISIT (OUTPATIENT)
Dept: OTHER | Facility: HOSPITAL | Age: 47
End: 2022-01-01
Payer: COMMERCIAL

## 2022-01-01 PROCEDURE — 99212 OFFICE O/P EST SF 10 MIN: CPT | Performed by: FAMILY MEDICINE

## 2022-01-01 PROCEDURE — ECARE PR SL URGENT CARE VIRTUAL VISIT: Performed by: FAMILY MEDICINE

## 2022-01-01 PROCEDURE — 1036F TOBACCO NON-USER: CPT | Performed by: FAMILY MEDICINE

## 2022-01-01 NOTE — CARE ANYWHERE EVISITS
Visit Summary for Ana Morse - Gender: Female - Date of Birth: [id number]  Date: [id number] - Duration: 1 minutes  Patient: Ana Morse  Provider: Roberto Pleitez    Patient Contact Information  Address  [address] [place] [address] Dr; [address]  [phone]    Visit Topics  We spoke earlier  My pharmacy is closed  Can you please send my script to the [facility] in [address]? [Added By: Self - 2022-01-01]    Triage Questions   What is your current physical address in the event of a medical emergency? Answer []  Are you allergic to any medications? Answer []  Are you now or could you be pregnant? Answer []  Do you have any immune system compromise or chronic lung   disease? Answer []  Do you have any vulnerable family members in the home (infant, pregnant, cancer, elderly)? Answer []     Conversation Transcripts  [0A][0A] [Notification] You are connected with Family Natalia Physician [0A][Notification] Dennie Number is located in [place]  [0A][Notification] Dennie Number has shared health history  Jaz Soulier  [0A]    Diagnosis    Procedures  Value: [id number] Code: CPT-4 UNLISTED E&M SERVICE    Medications Prescribed    amoxicillin  Dose : 1 tablet  Route : oral  Frequency : every 12 hours  Refills : 0  Instructions to the Pharmacist : Substitutions allowed    Diflucan  Dose : 1 tablet  Route : oral  Frequency : as instructed  Until directed to stop  Patient Instructions : Take one tab prn  Refills : 0  Instructions to the Pharmacist : Substitutions allowed      Provider Notes  [0A][0A] Pt calling back to inform us that previous pharmacy is closed today New Year's Day  Pharmacy updated and rx resent  [0A]All visit charges waived  [0A]    Electronically signed by:  Alex Jensen(NPI [id number])

## 2022-01-02 NOTE — CARE ANYWHERE EVISITS
Visit Summary for Ana Morse - Gender: Female - Date of Birth: [id number]  Date: [id number] - Duration: 4 minutes  Patient: Ana Morse  Provider: Roberto Pleitez    Patient Contact Information  Address  [address] [place] [address] Dr; [place] [address]  [phone]    Visit Topics  Sinus infection [Added By: Self - 2022-01-01]    Triage Questions   What is your current physical address in the event of a medical emergency? Answer []  Are you allergic to any medications? Answer []  Are you now or could you be pregnant? Answer []  Do you have any immune system compromise or chronic lung   disease? Answer []  Do you have any vulnerable family members in the home (infant, pregnant, cancer, elderly)? Answer []     Conversation Transcripts  [0A][0A] [Notification] Robbie Mcnulty, Global Staff, will help you prepare for your visit  She is assisting Family Natalia Physician [0A][Vero Husain] Nicolás, and thank you for connecting  While you are waiting for the doctor, are there any   questions I can answer for you about our service? Please contact customer service if you have questions about billing, insurance, or technical issues  Visits work best with a stable WiFi connection, so please make sure you are connected before we   begin [0A][Notification] Robbie Mcnulty has left the room  [0A][Notification] You are connected with Family Rema Fisher [0A][Notification] Dennie Number is located in [place]  [0A][Notification] Dennie Number has shared health history  Dara Soulier  [0A]    Diagnosis  Acute sinusitis, unspecified    Procedures  Value: [id number] Code: CPT-4 UNLISTED E&M SERVICE    Medications Prescribed    Diflucan  Dose : 1 tablet  Route : oral  Frequency : as instructed  Until directed to stop  Patient Instructions : Take one tab prn  Refills : 0  Instructions to the Pharmacist : Substitutions allowed    Diflucan  Dose : 1 tablet  Route : oral  Frequency : as instructed  Until directed to stop     Patient Instructions : Take one tab prn  Refills : 0  Instructions to the Pharmacist : Substitutions allowed    amoxicillin  Dose : 1 tablet  Route : oral  Frequency : every 12 hours  Refills : 0  Instructions to the Pharmacist : Substitutions allowed    amoxicillin  Dose : 1 tablet  Route : oral  Frequency : every 12 hours  Refills : 0  Instructions to the Pharmacist : Substitutions allowed      Provider Notes  [0A][0A] We strongly encourage you to share the following record of today's visit with your primary care physician  [0A]Contact phone number: see pt summary[0A]Mode of Communication: video[0A]HPI: The patient is a pleasant 52 y/o female who states that   on Dec 19 she had a cold with sore throat, cough and congestion  Symptoms lasted for 9 days and then she got better  Then yesterday she "went downhill suddenly"  C/o awful facial pain, fever and extreme fatigue  Took Dayquil - did not help much  [0A]Fully   Covid vaccinated and boosted  Negative Covid test[0A]PMH: anxiety[0A]PSH: [0A]Smoking HX: none[0A]Meds: buspirone, ocp[0A]Allergies: codeine[0A]LMP: 4 weeks (not pregnant)[0A]PE: [0A]Ht: 79 in: Wt: 170 lbs[0A]Gen: Patient seems uncomfortable   due to facial discomfort; but in no acute distress [0A]Eyes: Normal appearing[0A]Nose: Congested[0A]Sinuses: Sinus tenderness bilaterally - maxillary and ethmoid[0A]Resp: No respiratory distress[0A]Assessment: Sinusitis likely secondary bacterial   [0A]Antibiotic indication: "Double worsening symptoms" after resolution of cold symptoms - classic sign of acute bacterial rhinosinusitis; failure of otc meds  Antibiotic choice: amoxicillin - first line[0A]Mucus management: To make mucus thinner:[0A]1  Take Expectorant (guaifenesin 1200mg twice daily), humidity (twice daily) and hydration [0A]2  Avoid or minimize drying/thickening agents (decongestants and antihistamines)  [0A]Follow up:[0A]1  Follow up in 5-7 days if not improving  Discussed   precautions  [0A]2   If there are any questions or problems with the prescription, call [phone] anytime for assistance  [0A]3  Please re-connect for another online visit or see an in-person provider should your symptoms worsen or not improving 5-7   days  [0A]4  Taking a probiotic (either in pill form or by eating yogurt that contains probiotics) while using antibiotics can help prevent some of the troublesome side effects that antibiotics can sometimes cause  [0A]5  Please print a copy of this note   and send it to your regular doctor, or take it to your next visit so it may be included in your medical record  [0A]Patient voiced understanding and agrees to plan  [0A]Please see your PCP on an annual basis[0A][0A]    Electronically signed by:  Alex Cole(NPI [id number])

## 2022-01-31 ENCOUNTER — ANNUAL EXAM (OUTPATIENT)
Dept: OBGYN CLINIC | Age: 47
End: 2022-01-31
Payer: COMMERCIAL

## 2022-01-31 VITALS
SYSTOLIC BLOOD PRESSURE: 112 MMHG | HEIGHT: 70 IN | WEIGHT: 178.4 LBS | BODY MASS INDEX: 25.54 KG/M2 | DIASTOLIC BLOOD PRESSURE: 68 MMHG

## 2022-01-31 DIAGNOSIS — Z01.419 ENCOUNTER FOR GYNECOLOGICAL EXAMINATION (GENERAL) (ROUTINE) WITHOUT ABNORMAL FINDINGS: Primary | ICD-10-CM

## 2022-01-31 DIAGNOSIS — Z12.31 ENCOUNTER FOR SCREENING MAMMOGRAM FOR MALIGNANT NEOPLASM OF BREAST: ICD-10-CM

## 2022-01-31 DIAGNOSIS — N92.3 INTERMENSTRUAL BLEEDING: ICD-10-CM

## 2022-01-31 DIAGNOSIS — N92.0 MENORRHAGIA WITH REGULAR CYCLE: ICD-10-CM

## 2022-01-31 DIAGNOSIS — Z30.41 ENCOUNTER FOR SURVEILLANCE OF CONTRACEPTIVE PILLS: ICD-10-CM

## 2022-01-31 PROCEDURE — S0612 ANNUAL GYNECOLOGICAL EXAMINA: HCPCS | Performed by: STUDENT IN AN ORGANIZED HEALTH CARE EDUCATION/TRAINING PROGRAM

## 2022-01-31 PROCEDURE — 3008F BODY MASS INDEX DOCD: CPT | Performed by: FAMILY MEDICINE

## 2022-01-31 RX ORDER — LEVONORGESTREL AND ETHINYL ESTRADIOL 0.15-0.03
1 KIT ORAL DAILY
Qty: 84 TABLET | Refills: 4 | Status: SHIPPED | OUTPATIENT
Start: 2022-01-31 | End: 2022-02-28

## 2022-01-31 NOTE — PROGRESS NOTES
Amy Terre Cooks  1975    Assessment and Plan:  Yearly exam without abnormality      -Pap up to date, due   We reviewed ASCCP guidelines for Pap testing today  -Mammo slip provided   -TVUS for intermenstrual spotting and heavy menstrual bleeding when not taking OCPs  Discussed adeno vs  Endometrial polyp  Briefly reviewed management of each  Will follow up via phone regarding findings of TVUS  Continue OCP at this time  RTO one year for yearly exam or sooner as needed  CC:  Yearly exam    S:  51 y o  female here for yearly exam      Menarche: 14years old    Patient's last menstrual period was 2022 (exact date)  Contraception: altavera  Last Pap: 2019 NILM/HPV-  Last Mammo: 2021 BIRADS1  Last Colonoscopy:  with 10 year recall    Non-smoker, social drinker  Exercises regularly    Doing ok overall  Libido issues resolved  Kids in person at school, doing well  Only concern at this time is that she has been having intermenstrual spotting  Appx a week before menses are due, notes brown discharge/spotting  She did attempt to d/c OCPs after our last annual and menses were very heavy, so she restarted  Denies hot flushes, dyspareunia, does have intermittent urinary (not fecal) incontinence, denies changes in energy levels, mood  Sexual activity: She is sexually active without pain, bleeding or dryness  STD testing:  She does not want STD testing today       Family hx of breast/ovarian/colon cancer: denies      Current Outpatient Medications:     busPIRone (BUSPAR) 10 mg tablet, TAKE 1 TABLET BY MOUTH THREE TIMES A DAY, Disp: 270 tablet, Rfl: 1    fluticasone (FLONASE) 50 mcg/act nasal spray, 1 spray into each nostril daily, Disp: , Rfl:     levonorgestrel-ethinyl estradiol (Altavera) 0 15-30 MG-MCG per tablet, Take 1 tablet by mouth daily for 28 days, Disp: 84 tablet, Rfl: 4    Multiple Vitamins-Iron (DAILY MULTIPLE VITAMIN/IRON) TABS, Take by mouth, Disp: , Rfl:    Probiotic Product (PROBIOTIC-10 PO), Take by mouth, Disp: , Rfl:     traZODone (DESYREL) 50 mg tablet, Take 1 tablet (50 mg total) by mouth daily at bedtime as needed for sleep, Disp: 10 tablet, Rfl: 0  Social History     Socioeconomic History    Marital status: /Civil Union     Spouse name: Not on file    Number of children: Not on file    Years of education: Not on file    Highest education level: Not on file   Occupational History    Not on file   Tobacco Use    Smoking status: Never Smoker    Smokeless tobacco: Never Used   Vaping Use    Vaping Use: Never used   Substance and Sexual Activity    Alcohol use: Yes     Comment: social    Drug use: No    Sexual activity: Yes     Birth control/protection: Male Sterilization, Pill   Other Topics Concern    Not on file   Social History Narrative    1 cup tea/day- switched recently to decaf    Exercise - walking    Exercises moderately less than 3 times/wk     Social Determinants of Health     Financial Resource Strain: Not on file   Food Insecurity: Not on file   Transportation Needs: Not on file   Physical Activity: Not on file   Stress: Not on file   Social Connections: Not on file   Intimate Partner Violence: Not on file   Housing Stability: Not on file     Family History   Problem Relation Age of Onset    Leukemia Father     Dementia Maternal Grandmother     Parkinsonism Maternal Grandmother     Stroke Maternal Grandmother     Lymphoma Maternal Grandmother     Esophageal cancer Family     No Known Problems Sister     No Known Problems Maternal Aunt     No Known Problems Paternal Aunt     No Known Problems Daughter       Past Medical History:   Diagnosis Date    Inability to conceive, female         Review of Systems   Respiratory: Negative  Cardiovascular: Negative  Gastrointestinal: + intermittent constipation and diarrhea     Genitourinary: Negative for difficulty urinating, pelvic pain, vaginal bleeding, vaginal discharge, itching or odor  O:  Blood pressure 112/68, height 5' 10" (1 778 m), weight 80 9 kg (178 lb 6 4 oz), last menstrual period 01/31/2022, not currently breastfeeding  Patient appears well and is not in distress  Neck is supple without masses  Breasts are symmetrical without mass, tenderness, nipple discharge, skin changes or adenopathy  Abdomen is soft and nontender without masses  External genitals are normal without lesions or rashes  Urethral meatus and urethra are normal  Bladder is normal to palpation  Vagina is normal without discharge or bleeding  Cervix is normal without discharge or lesion  Uterus is normal, mobile, nontender without palpable mass  Adnexa are normal, nontender, without palpable mass

## 2022-02-07 ENCOUNTER — HOSPITAL ENCOUNTER (OUTPATIENT)
Dept: ULTRASOUND IMAGING | Facility: HOSPITAL | Age: 47
Discharge: HOME | End: 2022-02-07
Attending: STUDENT IN AN ORGANIZED HEALTH CARE EDUCATION/TRAINING PROGRAM
Payer: COMMERCIAL

## 2022-02-07 DIAGNOSIS — N92.0 MENORRHAGIA WITH REGULAR CYCLE: ICD-10-CM

## 2022-02-07 DIAGNOSIS — N92.3 INTERMENSTRUAL BLEEDING: ICD-10-CM

## 2022-02-07 PROCEDURE — 76830 TRANSVAGINAL US NON-OB: CPT

## 2022-02-07 PROCEDURE — 76856 US EXAM PELVIC COMPLETE: CPT

## 2022-02-13 DIAGNOSIS — F41.1 GENERALIZED ANXIETY DISORDER: ICD-10-CM

## 2022-02-15 RX ORDER — BUSPIRONE HYDROCHLORIDE 10 MG/1
TABLET ORAL
Qty: 270 TABLET | Refills: 1 | Status: SHIPPED | OUTPATIENT
Start: 2022-02-15

## 2022-03-14 ENCOUNTER — OFFICE VISIT (OUTPATIENT)
Dept: FAMILY MEDICINE CLINIC | Facility: CLINIC | Age: 47
End: 2022-03-14
Payer: COMMERCIAL

## 2022-03-14 VITALS
HEIGHT: 70 IN | BODY MASS INDEX: 25.2 KG/M2 | TEMPERATURE: 98.3 F | RESPIRATION RATE: 16 BRPM | DIASTOLIC BLOOD PRESSURE: 68 MMHG | HEART RATE: 76 BPM | OXYGEN SATURATION: 99 % | SYSTOLIC BLOOD PRESSURE: 100 MMHG | WEIGHT: 176 LBS

## 2022-03-14 DIAGNOSIS — G47.00 INSOMNIA, UNSPECIFIED TYPE: ICD-10-CM

## 2022-03-14 DIAGNOSIS — F41.1 GENERALIZED ANXIETY DISORDER: Primary | ICD-10-CM

## 2022-03-14 PROCEDURE — 99214 OFFICE O/P EST MOD 30 MIN: CPT | Performed by: NURSE PRACTITIONER

## 2022-03-14 PROCEDURE — 3725F SCREEN DEPRESSION PERFORMED: CPT | Performed by: NURSE PRACTITIONER

## 2022-03-14 PROCEDURE — 1036F TOBACCO NON-USER: CPT | Performed by: NURSE PRACTITIONER

## 2022-03-14 PROCEDURE — 3008F BODY MASS INDEX DOCD: CPT | Performed by: NURSE PRACTITIONER

## 2022-03-14 RX ORDER — TRAZODONE HYDROCHLORIDE 50 MG/1
50 TABLET ORAL
Qty: 10 TABLET | Refills: 0 | Status: SHIPPED | OUTPATIENT
Start: 2022-03-14 | End: 2022-06-23 | Stop reason: SDUPTHER

## 2022-03-14 NOTE — ASSESSMENT & PLAN NOTE
Patient previously prescribed trazodone but was hesitant to start the medication  She reports waking up frequently at nighttime  Advised to begin trazodone half a tab at first to ensure is well tolerated  Counseled on good sleep hygiene

## 2022-03-14 NOTE — PROGRESS NOTES
Assessment/Plan:    Generalized anxiety disorder  Stable  To continue buspirone 10 mg every 8 hours  Counseled the importance of daily physical activity limiting caffeine  Follow-up in 6 months or sooner if needed  Insomnia  Patient previously prescribed trazodone but was hesitant to start the medication  She reports waking up frequently at nighttime  Advised to begin trazodone half a tab at first to ensure is well tolerated  Counseled on good sleep hygiene  Diagnoses and all orders for this visit:    Generalized anxiety disorder    Insomnia, unspecified type  -     traZODone (DESYREL) 50 mg tablet; Take 1 tablet (50 mg total) by mouth daily at bedtime as needed for sleep          Subjective:      Patient ID: Damon Mclean is a 51 y o  female  Ana presents for a follow-up  In regards to her anxiety, she feels as though it is controlled on her current dose of buspirone 10 mg every 8 hours  She continues to have difficulty sleeping  Ana was previously prescribed trazodone but was hesitant to start this  She frequently will have difficulty sleeping throughout the whole night  She has been trying to work on weight loss and has been exercising 5 days a week        The following portions of the patient's history were reviewed and updated as appropriate: She   Patient Active Problem List    Diagnosis Date Noted    Insomnia 09/13/2021    Generalized anxiety disorder 04/05/2021    Dense breast 01/15/2019    Intermittent constipation 05/24/2018     Current Outpatient Medications   Medication Sig Dispense Refill    busPIRone (BUSPAR) 10 mg tablet TAKE 1 TABLET BY MOUTH THREE TIMES A  tablet 1    fluticasone (FLONASE) 50 mcg/act nasal spray 1 spray into each nostril daily      Multiple Vitamins-Iron (DAILY MULTIPLE VITAMIN/IRON) TABS Take by mouth      Probiotic Product (PROBIOTIC-10 PO) Take by mouth      levonorgestrel-ethinyl estradiol (Altavera) 0 15-30 MG-MCG per tablet Take 1 tablet by mouth daily for 28 days 84 tablet 4    traZODone (DESYREL) 50 mg tablet Take 1 tablet (50 mg total) by mouth daily at bedtime as needed for sleep 10 tablet 0     No current facility-administered medications for this visit  She is allergic to mold extract [trichophyton] and pollen extract       Review of Systems   Constitutional: Negative  HENT: Negative  Eyes: Negative  Respiratory: Negative  Cardiovascular: Negative  Gastrointestinal: Negative  Endocrine: Negative  Genitourinary: Negative  Musculoskeletal: Negative  Skin: Negative  Allergic/Immunologic: Negative  Neurological: Negative  Hematological: Negative  Psychiatric/Behavioral: Positive for sleep disturbance  The patient is nervous/anxious (Improved)  /68   Pulse 76   Temp 98 3 °F (36 8 °C)   Resp 16   Ht 5' 10" (1 778 m)   Wt 79 8 kg (176 lb)   LMP 02/28/2022 (Exact Date)   SpO2 99%   BMI 25 25 kg/m²     Objective:     Physical Exam  Vitals and nursing note reviewed  Constitutional:       General: She is not in acute distress  Appearance: Normal appearance  She is well-developed  She is not ill-appearing, toxic-appearing or diaphoretic  HENT:      Head: Normocephalic and atraumatic  Right Ear: Hearing, tympanic membrane, ear canal and external ear normal       Left Ear: Hearing, tympanic membrane, ear canal and external ear normal       Nose: Nose normal       Mouth/Throat:      Mouth: Mucous membranes are moist       Pharynx: Oropharynx is clear  Uvula midline  Eyes:      General: Lids are normal       Conjunctiva/sclera: Conjunctivae normal       Pupils: Pupils are equal, round, and reactive to light  Neck:      Thyroid: No thyromegaly  Cardiovascular:      Rate and Rhythm: Normal rate and regular rhythm  Heart sounds: Normal heart sounds  No murmur heard  Pulmonary:      Effort: Pulmonary effort is normal  No respiratory distress        Breath sounds: Normal breath sounds  No wheezing or rales  Chest:      Chest wall: No tenderness  Abdominal:      General: Bowel sounds are normal  There is no distension  Palpations: Abdomen is soft  There is no mass  Tenderness: There is no abdominal tenderness  There is no guarding or rebound  Musculoskeletal:         General: No tenderness or deformity  Normal range of motion  Cervical back: Normal range of motion and neck supple  Lymphadenopathy:      Cervical: No cervical adenopathy  Skin:     General: Skin is warm and dry  Coloration: Skin is not pale  Findings: No erythema or rash  Neurological:      General: No focal deficit present  Mental Status: She is alert and oriented to person, place, and time  Cranial Nerves: No cranial nerve deficit  Psychiatric:         Mood and Affect: Mood normal          Behavior: Behavior normal          Thought Content: Thought content normal          Judgment: Judgment normal          BMI Counseling: Body mass index is 25 25 kg/m²  The BMI is above normal  Nutrition recommendations include decreasing overall calorie intake, 3-5 servings of fruits/vegetables daily, reducing fast food intake, consuming healthier snacks, decreasing soda and/or juice intake and moderation in carbohydrate intake  Exercise recommendations include exercising 3-5 times per week

## 2022-03-14 NOTE — ASSESSMENT & PLAN NOTE
Stable  To continue buspirone 10 mg every 8 hours  Counseled the importance of daily physical activity limiting caffeine  Follow-up in 6 months or sooner if needed

## 2022-06-23 DIAGNOSIS — G47.00 INSOMNIA, UNSPECIFIED TYPE: ICD-10-CM

## 2022-06-23 RX ORDER — TRAZODONE HYDROCHLORIDE 50 MG/1
50 TABLET ORAL
Qty: 10 TABLET | Refills: 0 | Status: SHIPPED | OUTPATIENT
Start: 2022-06-23

## 2022-07-25 ENCOUNTER — HOSPITAL ENCOUNTER (OUTPATIENT)
Dept: MAMMOGRAPHY | Facility: CLINIC | Age: 47
Discharge: HOME | End: 2022-07-25
Payer: COMMERCIAL

## 2022-07-25 DIAGNOSIS — Z12.31 ENCOUNTER FOR SCREENING MAMMOGRAM FOR MALIGNANT NEOPLASM OF BREAST: ICD-10-CM

## 2022-07-25 PROCEDURE — 77067 SCR MAMMO BI INCL CAD: CPT

## 2022-07-25 PROCEDURE — 77063 BREAST TOMOSYNTHESIS BI: CPT

## 2022-07-28 ENCOUNTER — TELEMEDICINE (OUTPATIENT)
Dept: FAMILY MEDICINE CLINIC | Facility: CLINIC | Age: 47
End: 2022-07-28
Payer: COMMERCIAL

## 2022-07-28 DIAGNOSIS — U07.1 COVID-19: Primary | ICD-10-CM

## 2022-07-28 PROCEDURE — 99213 OFFICE O/P EST LOW 20 MIN: CPT | Performed by: NURSE PRACTITIONER

## 2022-07-28 NOTE — PROGRESS NOTES
COVID-19 Outpatient Progress Note    Assessment/Plan:    Problem List Items Addressed This Visit        Other    COVID-19 - Primary         Disposition:     Discussed vitamin D, vitamin C, and/or zinc supplementation with patient  I have spent 6 minutes directly with the patient  Greater than 50% of this time was spent in counseling/coordination of care regarding: diagnostic results, prognosis, risks and benefits of treatment options, instructions for management, patient and family education, importance of treatment compliance, risk factor reductions and impressions  Counseled on resting, staying well hydrated, and continuing vitamin-C, zinc, and vitamin-D are mild  Follow-up if no improvement in 1 week or sooner if symptoms worsen  Encounter provider KONRAD Resendiz    Provider located at [facility] [address] [address] [phone]    Recent Visits  No visits were found meeting these conditions  Showing recent visits within past 7 days and meeting all other requirements  Today's Visits  Date Type Provider Dept   07/28/22 Telemedicine KONRAD Resendiz [address] today's visits and meeting all other requirements  Future Appointments  No visits were found meeting these conditions  Showing future appointments within next 150 days and meeting all other requirements     This virtual check-in was done via [employer] and patient was informed that this is a secure, HIPAA-compliant platform  She agrees to proceed  Patient agrees to participate in a virtual check in via telephone or video visit instead of presenting to the office to address urgent/immediate medical needs  Patient is aware this is a billable service  After connecting through [facility], the patient was identified by name and date of birth   Eliseo Vazquez was informed that this was a telemedicine visit and that the exam was being conducted confidentially over secure lines  My office door was closed  No one else was in the room  Sumeet Leyva acknowledged consent and understanding of privacy and security of the telemedicine visit  I informed the patient that I have reviewed her record in Epic and presented the opportunity for her to ask any questions regarding the visit today  The patient agreed to participate  Verification of patient location:  Patient is located in the following state in which I hold an active license: [place]    Subjective:   Sumeet Leyva is a 51 y o  female who is concerned about COVID-19  Patient's symptoms include nasal congestion, rhinorrhea, cough (slight) and headache (slight)  Patient denies fever, chills, fatigue, malaise, sore throat, anosmia, loss of taste, shortness of breath, chest tightness, abdominal pain, nausea, vomiting, diarrhea and myalgias  - Date of symptom onset: 7/24/2022      COVID-19 vaccination status: Fully vaccinated with booster    Exposure:   Contact with a person who is under investigation (PUI) for or who is positive for COVID-19 within the last 14 days?: Yes    Hospitalized recently for fever and/or lower respiratory symptoms?: No      Currently a healthcare worker that is involved in direct patient care?: No      Works in a special setting where the risk of COVID-19 transmission may be high? (this may include long-term care, correctional and retirement facilities; homeless shelters; assisted-living facilities and group homes ): No      Resident in a special setting where the risk of COVID-19 transmission may be high? (this may include long-term care, correctional and retirement facilities; homeless shelters; assisted-living facilities and group homes ): No      Ana presents via virtual visit  She tested positive for covid on 7/26/22  Her symptoms are mild  She is eating and drinking well       No results found for: Ronald Ku, [address] [facility], [address] [facility], [place] [address], [address], [address] [facility]  Past Medical History:   Diagnosis Date    Inability to conceive, female      Past Surgical History:   Procedure Laterality Date     SECTION      FOOT SURGERY      TOOTH EXTRACTION       Current Outpatient Medications   Medication Sig Dispense Refill    busPIRone (BUSPAR) 10 mg tablet TAKE 1 TABLET BY MOUTH THREE TIMES A  tablet 1    fluticasone (FLONASE) 50 mcg/act nasal spray 1 spray into each nostril daily      levonorgestrel-ethinyl estradiol (Altavera) 0 15-30 MG-MCG per tablet Take 1 tablet by mouth daily for 28 days 84 tablet 4    Multiple Vitamins-Iron (DAILY MULTIPLE VITAMIN/IRON) TABS Take by mouth      Probiotic Product (PROBIOTIC-10 PO) Take by mouth      traZODone (DESYREL) 50 mg tablet Take 1 tablet (50 mg total) by mouth daily at bedtime as needed for sleep 10 tablet 0     No current facility-administered medications for this visit  Allergies   Allergen Reactions    Mold Extract [Trichophyton] Itching and Sneezing    Pollen Extract Itching and Sneezing       Review of Systems   Constitutional: Negative for chills, fatigue and fever  HENT: Positive for congestion and rhinorrhea  Negative for sore throat  Respiratory: Positive for cough (slight)  Negative for chest tightness and shortness of breath  Gastrointestinal: Negative for abdominal pain, diarrhea, nausea and vomiting  Musculoskeletal: Negative for myalgias  Neurological: Positive for headaches (slight)  Objective: There were no vitals filed for this visit  Physical Exam  Vitals reviewed  Constitutional:       General: She is not in acute distress  Appearance: Normal appearance  She is not ill-appearing, toxic-appearing or diaphoretic  HENT:      Head: Normocephalic and atraumatic  Eyes:      Conjunctiva/sclera: Conjunctivae normal    Pulmonary:      Effort: Pulmonary effort is normal    Musculoskeletal:      Cervical back: Neck supple     Lymphadenopathy: Cervical: No cervical adenopathy  Neurological:      General: No focal deficit present  Mental Status: She is alert and oriented to person, place, and time  Psychiatric:         Mood and Affect: Mood normal          Behavior: Behavior normal          Thought Content: Thought content normal          Judgment: Judgment normal          VIRTUAL VISIT DISCLAIMER    Ana Morse verbally agrees to participate in [facility]  Pt is aware that [facility] could be limited without vital signs or the ability to perform a full hands-on physical exam  Ana Ziegler understands she or the provider may request at any time to terminate the video visit and request the patient to seek care or treatment in person

## 2022-08-12 DIAGNOSIS — F41.1 GENERALIZED ANXIETY DISORDER: ICD-10-CM

## 2022-08-15 RX ORDER — BUSPIRONE HYDROCHLORIDE 10 MG/1
TABLET ORAL
Qty: 270 TABLET | Refills: 1 | Status: SHIPPED | OUTPATIENT
Start: 2022-08-15

## 2022-09-08 ENCOUNTER — RA CDI HCC (OUTPATIENT)
Dept: OTHER | Facility: HOSPITAL | Age: 47
End: 2022-09-08

## 2022-09-08 NOTE — PROGRESS NOTES
[facility]  coding opportunities       Chart reviewed, no opportunity found: CHART REVIEWED, NO OPPORTUNITY FOUND        Patients Insurance        Commercial Insurance: [address] [place] [address]

## 2022-09-15 ENCOUNTER — OFFICE VISIT (OUTPATIENT)
Dept: FAMILY MEDICINE CLINIC | Facility: CLINIC | Age: 47
End: 2022-09-15
Payer: COMMERCIAL

## 2022-09-15 VITALS
BODY MASS INDEX: 25.48 KG/M2 | HEART RATE: 86 BPM | SYSTOLIC BLOOD PRESSURE: 112 MMHG | DIASTOLIC BLOOD PRESSURE: 72 MMHG | OXYGEN SATURATION: 99 % | HEIGHT: 70 IN | WEIGHT: 178 LBS

## 2022-09-15 DIAGNOSIS — F41.1 GENERALIZED ANXIETY DISORDER: ICD-10-CM

## 2022-09-15 DIAGNOSIS — G47.00 INSOMNIA, UNSPECIFIED TYPE: ICD-10-CM

## 2022-09-15 DIAGNOSIS — Z23 NEED FOR IMMUNIZATION AGAINST INFLUENZA: ICD-10-CM

## 2022-09-15 DIAGNOSIS — Z11.59 ENCOUNTER FOR HEPATITIS C SCREENING TEST FOR LOW RISK PATIENT: ICD-10-CM

## 2022-09-15 DIAGNOSIS — Z00.00 ANNUAL PHYSICAL EXAM: Primary | ICD-10-CM

## 2022-09-15 PROBLEM — U07.1 COVID-19: Status: RESOLVED | Noted: 2022-07-28 | Resolved: 2022-09-15

## 2022-09-15 PROCEDURE — 3725F SCREEN DEPRESSION PERFORMED: CPT | Performed by: NURSE PRACTITIONER

## 2022-09-15 PROCEDURE — 99396 PREV VISIT EST AGE 40-64: CPT | Performed by: NURSE PRACTITIONER

## 2022-09-15 PROCEDURE — 90471 IMMUNIZATION ADMIN: CPT

## 2022-09-15 PROCEDURE — 90682 RIV4 VACC RECOMBINANT DNA IM: CPT

## 2022-09-15 RX ORDER — FLUOCINOLONE ACETONIDE 0.25 MG/G
OINTMENT TOPICAL
COMMUNITY
Start: 2022-07-11

## 2022-09-15 RX ORDER — COVID-19 ANTIGEN TEST
KIT MISCELLANEOUS
COMMUNITY
Start: 2022-07-23

## 2022-09-15 NOTE — PATIENT INSTRUCTIONS
Wellness Visit for Adults   AMBULATORY CARE:   A wellness visit  is when you see your healthcare provider to get screened for health problems  Your healthcare provider will also give you advice on how to stay healthy  Write down your questions so you remember to ask them  Ask your healthcare provider how often you should have a wellness visit  What happens at a wellness visit:  Your healthcare provider will ask about your health, and your family history of health problems  This includes high blood pressure, heart disease, and cancer  He or she will ask if you have symptoms that concern you, if you smoke, and about your mood  You may also be asked about your intake of medicines, supplements, food, and alcohol  Any of the following may be done:  · Your weight  will be checked  Your height may also be checked so your body mass index (BMI) can be calculated  Your BMI shows if you are at a healthy weight  · Your blood pressure  and heart rate will be checked  Your temperature may also be checked  · Blood and urine tests  may be done  Blood tests may be done to check your cholesterol levels  Abnormal cholesterol levels increase your risk for heart disease and stroke  You may also need a blood or urine test to check for diabetes if you are at increased risk  Urine tests may be done to look for signs of an infection or kidney disease  · A physical exam  includes checking your heartbeat and lungs with a stethoscope  Your healthcare provider may also check your skin to look for sun damage  · Screening tests  may be recommended  A screening test is done to check for diseases that may not cause symptoms  The screening tests you may need depend on your age, gender, family history, and lifestyle habits  For example, colorectal screening may be recommended if you are 48years old or older  Screening tests you need if you are a woman:   · A Pap smear  is used to screen for cervical cancer   Pap smears are usually done every 3 to 5 years depending on your age  You may need them more often if you have had abnormal Pap smear test results in the past  Ask your healthcare provider how often you should have a Pap smear  · A mammogram  is an x-ray of your breasts to screen for breast cancer  Experts recommend mammograms every 2 years starting at age 48 years  You may need a mammogram at age 52 years or younger if you have an increased risk for breast cancer  Talk to your healthcare provider about when you should start having mammograms and how often you need them  Vaccines you may need:   · Get an influenza vaccine  every year  The influenza vaccine protects you from the flu  Several types of viruses cause the flu  The viruses change over time, so new vaccines are made each year  · Get a tetanus-diphtheria (Td) booster vaccine  every 10 years  This vaccine protects you against tetanus and diphtheria  Tetanus is a severe infection that may cause painful muscle spasms and lockjaw  Diphtheria is a severe bacterial infection that causes a thick covering in the back of your mouth and throat  · Get a human papillomavirus (HPV) vaccine  if you are female and aged 23 to 32 or male 23 to 24 and never received it  This vaccine protects you from HPV infection  HPV is the most common infection spread by sexual contact  HPV may also cause vaginal, penile, and anal cancers  · Get a pneumococcal vaccine  if you are aged 72 years or older  The pneumococcal vaccine is an injection given to protect you from pneumococcal disease  Pneumococcal disease is an infection caused by pneumococcal bacteria  The infection may cause pneumonia, meningitis, or an ear infection  · Get a shingles vaccine  if you are 60 or older, even if you have had shingles before  The shingles vaccine is an injection to protect you from the varicella-zoster virus  This is the same virus that causes chickenpox   Shingles is a painful rash that develops in people who had chickenpox or have been exposed to the virus  How to eat healthy:  My Plate is a model for planning healthy meals  It shows the types and amounts of foods that should go on your plate  Fruits and vegetables make up about half of your plate, and grains and protein make up the other half  A serving of dairy is included on the side of your plate  The amount of calories and serving sizes you need depends on your age, gender, weight, and height  Examples of healthy foods are listed below:  · Eat a variety of vegetables  such as dark green, red, and orange vegetables  You can also include canned vegetables low in sodium (salt) and frozen vegetables without added butter or sauces  · Eat a variety of fresh fruits , canned fruit in 100% juice, frozen fruit, and dried fruit  · Include whole grains  At least half of the grains you eat should be whole grains  Examples include whole-wheat bread, wheat pasta, brown rice, and whole-grain cereals such as oatmeal     · Eat a variety of protein foods such as seafood (fish and shellfish), lean meat, and poultry without skin (turkey and chicken)  Examples of lean meats include pork leg, shoulder, or tenderloin, and beef round, sirloin, tenderloin, and extra lean ground beef  Other protein foods include eggs and egg substitutes, beans, peas, soy products, nuts, and seeds  · Choose low-fat dairy products such as skim or 1% milk or low-fat yogurt, cheese, and cottage cheese  · Limit unhealthy fats  such as butter, hard margarine, and shortening  Exercise:  Exercise at least 30 minutes per day on most days of the week  Some examples of exercise include walking, biking, dancing, and swimming  You can also fit in more physical activity by taking the stairs instead of the elevator or parking farther away from stores  Include muscle strengthening activities 2 days each week  Regular exercise provides many health benefits   It helps you manage your weight, and decreases your risk for type 2 diabetes, heart disease, stroke, and high blood pressure  Exercise can also help improve your mood  Ask your healthcare provider about the best exercise plan for you  General health and safety guidelines:   · Do not smoke  Nicotine and other chemicals in cigarettes and cigars can cause lung damage  Ask your healthcare provider for information if you currently smoke and need help to quit  E-cigarettes or smokeless tobacco still contain nicotine  Talk to your healthcare provider before you use these products  · Limit alcohol  A drink of alcohol is 12 ounces of beer, 5 ounces of wine, or 1½ ounces of liquor  · Lose weight, if needed  Being overweight increases your risk of certain health conditions  These include heart disease, high blood pressure, type 2 diabetes, and certain types of cancer  · Protect your skin  Do not sunbathe or use tanning beds  Use sunscreen with a SPF 15 or higher  Apply sunscreen at least 15 minutes before you go outside  Reapply sunscreen every 2 hours  Wear protective clothing, hats, and sunglasses when you are outside  · Drive safely  Always wear your seatbelt  Make sure everyone in your car wears a seatbelt  A seatbelt can save your life if you are in an accident  Do not use your cell phone when you are driving  This could distract you and cause an accident  Pull over if you need to make a call or send a text message  · Practice safe sex  Use latex condoms if are sexually active and have more than one partner  Your healthcare provider may recommend screening tests for sexually transmitted infections (STIs)  · Wear helmets, lifejackets, and protective gear  Always wear a helmet when you ride a bike or motorcycle, go skiing, or play sports that could cause a head injury  Wear protective equipment when you play sports  Wear a lifejacket when you are on a boat or doing water sports      © Copyright [address] 2022 Information is for End User's use only and may not be sold, redistributed or otherwise used for commercial purposes  All illustrations and images included in CareNotes® are the copyrighted property of A D A M [employer]  or Alida Meza  The above information is an  only  It is not intended as medical advice for individual conditions or treatments  Talk to your doctor, nurse or pharmacist before following any medical regimen to see if it is safe and effective for you  Heart Healthy Diet   AMBULATORY CARE:   A heart healthy diet  is an eating plan low in unhealthy fats and sodium (salt)  The plan is high in healthy fats and fiber  A heart healthy diet helps improve your cholesterol levels and lowers your risk for heart disease and stroke  A dietitian will teach you how to read and understand food labels  Heart healthy diet guidelines to follow:   · Choose foods that contain healthy fats  ? Unsaturated fats  include monounsaturated and polyunsaturated fats  Unsaturated fat is found in foods such as soybean, canola, olive, corn, and safflower oils  It is also found in soft tub margarine that is made with liquid vegetable oil  ? Omega-3 fat  is found in certain fish, such as salmon, tuna, and trout, and in walnuts and flaxseed  Eat fish high in omega-3 fats at least 2 times a week  · Get 20 to 30 grams of fiber each day  Fruits, vegetables, whole-grain foods, and legumes (cooked beans) are good sources of fiber  · Limit or do not have unhealthy fats  ? Cholesterol  is found in animal foods, such as eggs and lobster, and in dairy products made from whole milk  Limit cholesterol to less than 200 mg each day  ? Saturated fat  is found in meats, such as hogan and hamburger  It is also found in chicken or turkey skin, whole milk, and butter  Limit saturated fat to less than 7% of your total daily calories  ? Trans fat  is found in packaged foods, such as potato chips and cookies   It is also in hard margarine, some fried foods, and shortening  Do not eat foods that contain trans fats  · Limit sodium as directed  You may be told to limit sodium to 2,000 to 2,300 mg each day  Choose low-sodium or no-salt-added foods  Add little or no salt to food you prepare  Use herbs and spices in place of salt  Include the following in your heart healthy plan:  Ask your dietitian or healthcare provider how many servings to have from each of the following food groups:  · Grains:      ? Whole-wheat breads, cereals, and pastas, and brown rice    ? Low-fat, low-sodium crackers and chips    · Vegetables:      ? Broccoli, green beans, green peas, and spinach    ? Collards, kale, and lima beans    ? Carrots, sweet potatoes, tomatoes, and peppers    ? Canned vegetables with no salt added    · Fruits:      ? Bananas, peaches, pears, and pineapple    ? Grapes, raisins, and dates    ? Oranges, tangerines, grapefruit, orange juice, and grapefruit juice    ? Apricots, mangoes, melons, and papaya    ? Raspberries and strawberries    ? Canned fruit with no added sugar    · Low-fat dairy:      ? Nonfat (skim) milk, 1% milk, and low-fat almond, cashew, or soy milks fortified with calcium    ? Low-fat cheese, regular or frozen yogurt, and cottage cheese    · Meats and proteins:      ? Lean cuts of beef and pork (loin, leg, round), skinless chicken and turkey    ? Legumes, soy products, egg whites, or nuts    Limit or do not include the following in your heart healthy plan:   · Unhealthy fats and oils:      ? Whole or 2% milk, cream cheese, sour cream, or cheese    ? High-fat cuts of beef (T-bone steaks, ribs), chicken or turkey with skin, and organ meats such as liver    ?  Butter, stick margarine, shortening, and cooking oils such as coconut or palm oil    · Foods and liquids high in sodium:      ? Packaged foods, such as frozen dinners, cookies, macaroni and cheese, and cereals with more than 300 mg of sodium per serving    ? Vegetables with added sodium, such as instant potatoes, vegetables with added sauces, or regular canned vegetables    ? Cured or smoked meats, such as hot dogs, hogan, and sausage    ? High-sodium ketchup, barbecue sauce, salad dressing, pickles, olives, soy sauce, or miso    · Foods and liquids high in sugar:      ? Candy, cake, cookies, pies, or doughnuts    ? Soft drinks (soda), sports drinks, or sweetened tea    ? Canned or dry mixes for cakes, soups, sauces, or gravies    Other healthy heart guidelines:   · Do not smoke  Nicotine and other chemicals in cigarettes and cigars can cause lung and heart damage  Ask your healthcare provider for information if you currently smoke and need help to quit  E-cigarettes or smokeless tobacco still contain nicotine  Talk to your healthcare provider before you use these products  · Limit or do not drink alcohol as directed  Alcohol can damage your heart and raise your blood pressure  Your healthcare provider may give you specific daily and weekly limits  The general recommended limit is 1 drink a day for women 21 or older and for men 72 or older  Do not have more than 3 drinks in a day or 7 in a week  The recommended limit is 2 drinks a day for men 24to 59years of age  Do not have more than 4 drinks in a day or 14 in a week  A drink of alcohol is 12 ounces of beer, 5 ounces of wine, or 1½ ounces of liquor  · Exercise regularly  Exercise can help you maintain a healthy weight and improve your blood pressure and cholesterol levels  Regular exercise can also decrease your risk for heart problems  Ask your healthcare provider about the best exercise plan for you  Do not start an exercise program without asking your healthcare provider  Follow up with your doctor or cardiologist as directed:  Write down your questions so you remember to ask them during your visits    © Copyright [employer] 2022 Information is for End User's use only and may not be sold, redistributed or otherwise used for commercial purposes  All illustrations and images included in CareNotes® are the copyrighted property of A [employer] M [employer] Inc  or Alida Meza  The above information is an  only  It is not intended as medical advice for individual conditions or treatments  Talk to your doctor, nurse or pharmacist before following any medical regimen to see if it is safe and effective for you

## 2022-09-15 NOTE — PROGRESS NOTES
[facility] [address] [facility]    NAME: Tessie Newberry  AGE: 51 y o  SEX: female  : 1975     DATE: 9/15/2022     Assessment and Plan:     Problem List Items Addressed This Visit        Other    Generalized anxiety disorder     Stable  To continue Buspirone 10 mg every 8 hours  Follow up PRN or in 6 months  Insomnia     Stable  To continue trazodone 50 mg as needed  Other Visit Diagnoses     Annual physical exam    -  Primary    BMI 25 0-25 9,adult        To obtain labs, will call with results  Relevant Orders    CBC and differential    Comprehensive metabolic panel    Lipid Panel with Direct LDL reflex    TSH, 3rd generation with Free T4 reflex    Encounter for hepatitis C screening test for low risk patient        Relevant Orders    Hepatitis C antibody    Need for immunization against influenza        Relevant Orders    influenza vaccine, quadrivalent, recombinant, PF, 0 5 mL, for patients 18 yr+ (FLUBLOK) (Completed)          Immunizations and preventive care screenings were discussed with patient today  Appropriate education was printed on patient's after visit summary  Counseling:  Alcohol/drug use: discussed moderation in alcohol intake, the recommendations for healthy alcohol use, and avoidance of illicit drug use  Dental Health: discussed importance of regular tooth brushing, flossing, and dental visits  Injury prevention: discussed safety/seat belts, safety helmets, smoke detectors, carbon dioxide detectors, and smoking near bedding or upholstery  Sexual health: discussed sexually transmitted diseases, partner selection, use of condoms, avoidance of unintended pregnancy, and contraceptive alternatives  · Exercise: the importance of regular exercise/physical activity was discussed  Recommend exercise 3-5 times per week for at least 30 minutes            Return in about 6 months (around 3/15/2023) for Recheck  Chief Complaint:     Chief Complaint   Patient presents with    Physical Exam      History of Present Illness:     Adult Annual Physical   Patient here for a comprehensive physical exam  The patient reports no problems  Diet and Physical Activity  · Diet/Nutrition: well balanced diet  · Exercise: walking  Depression Screening  PHQ-2/9 Depression Screening    Little interest or pleasure in doing things: 0 - not at all  Feeling down, depressed, or hopeless: 0 - not at all  PHQ-2 Score: 0  PHQ-2 Interpretation: Negative depression screen       General Health  · Sleep: gets 4-6 hours of sleep on average and Takes trazodone occasionally   · Hearing: normal - bilateral   · Vision: most recent eye exam <1 year ago and wears glasses  · Dental: regular dental visits  /GYN Health  · Patient is: premenopausal  · Last menstrual period: 21  · Contraceptive method: oral contraceptives, partner had a vasectomy   Review of Systems:     Review of Systems   Constitutional: Negative  HENT: Negative  Eyes: Negative  Respiratory: Negative  Cardiovascular: Negative  Gastrointestinal: Negative  Endocrine: Negative  Genitourinary: Negative  Musculoskeletal: Negative  Skin: Negative  Allergic/Immunologic: Negative  Neurological: Negative  Hematological: Negative  Psychiatric/Behavioral: Positive for sleep disturbance (occasionally)  The patient is nervous/anxious (improved)         Past Medical History:     Past Medical History:   Diagnosis Date    Inability to conceive, female       Past Surgical History:     Past Surgical History:   Procedure Laterality Date     SECTION      FOOT SURGERY      TOOTH EXTRACTION        Social History:     Social History     Socioeconomic History    Marital status: /Civil Union     Spouse name: None    Number of children: None    Years of education: None    Highest education level: None   Occupational History    None   Tobacco Use    Smoking status: Never Smoker    Smokeless tobacco: Never Used   Vaping Use    Vaping Use: Never used   Substance and Sexual Activity    Alcohol use: Yes     Comment: social    Drug use: No    Sexual activity: Yes     Birth control/protection: Male Sterilization, Pill   Other Topics Concern    None   Social History Narrative    1 cup tea/day- switched recently to decaf    Exercise - walking    Exercises moderately less than 3 times/wk     Social Determinants of Health     Financial Resource Strain: Not on file   Food Insecurity: Not on file   Transportation Needs: Not on file   Physical Activity: Not on file   Stress: Not on file   Social Connections: Not on file   Intimate Partner Violence: Not on file   Housing Stability: Not on file      Family History:     Family History   Problem Relation Age of Onset    No Known Problems Mother     Leukemia Father     No Known Problems Sister     No Known Problems Daughter     Dementia Maternal Grandmother     Parkinsonism Maternal Grandmother     Stroke Maternal Grandmother     Lymphoma Maternal Grandmother     No Known Problems Maternal Aunt     No Known Problems Paternal Aunt     Esophageal cancer Family     Breast cancer Neg Hx       Current Medications:     Current Outpatient Medications   Medication Sig Dispense Refill    busPIRone (BUSPAR) 10 mg tablet TAKE 1 TABLET BY MOUTH THREE TIMES A  tablet 1    fluocinolone (SYNALAR) 0 025 % ointment APPLY TO AFFECTED AREA ON THE EYELIDS TWICE A DAY X 7 DAYS MAX AS NEEDED      fluticasone (FLONASE) 50 mcg/act nasal spray 1 spray into each nostril daily      levonorgestrel-ethinyl estradiol (Altavera) 0 15-30 MG-MCG per tablet Take 1 tablet by mouth daily for 28 days 84 tablet 4    Multiple Vitamins-Iron (DAILY MULTIPLE VITAMIN/IRON) TABS Take by mouth      Probiotic Product (PROBIOTIC-10 PO) Take by mouth      [employer] At-Home Covid-19 Test KIT REFER TO PACKAGE INSTRUCTIONS INCLUDED IN BOX      traZODone (DESYREL) 50 mg tablet Take 1 tablet (50 mg total) by mouth daily at bedtime as needed for sleep 10 tablet 0     No current facility-administered medications for this visit  Allergies: Allergies   Allergen Reactions    Mold Extract [Trichophyton] Itching and Sneezing    Pollen Extract Itching and Sneezing      Physical Exam:     /72   Pulse 86   Ht 5' 10" (1 778 m)   Wt 80 7 kg (178 lb)   SpO2 99%   BMI 25 54 kg/m²     Physical Exam  Vitals and nursing note reviewed  Constitutional:       General: She is not in acute distress  Appearance: Normal appearance  She is well-developed and normal weight  She is not ill-appearing, toxic-appearing or diaphoretic  HENT:      Head: Normocephalic and atraumatic  Right Ear: Tympanic membrane, ear canal and external ear normal       Left Ear: Tympanic membrane, ear canal and external ear normal       Nose: Nose normal       Mouth/Throat:      Mouth: Mucous membranes are moist       Pharynx: Oropharynx is clear  No oropharyngeal exudate  Eyes:      Extraocular Movements: Extraocular movements intact  Conjunctiva/sclera: Conjunctivae normal       Pupils: Pupils are equal, round, and reactive to light  Neck:      Thyroid: No thyromegaly  Cardiovascular:      Rate and Rhythm: Normal rate and regular rhythm  Pulses: Normal pulses  Heart sounds: Normal heart sounds  No murmur heard  Pulmonary:      Effort: Pulmonary effort is normal  No respiratory distress  Breath sounds: Normal breath sounds  No stridor  No wheezing or rales  Chest:      Chest wall: No tenderness  Abdominal:      General: Bowel sounds are normal  There is no distension  Palpations: Abdomen is soft  There is no mass  Tenderness: There is no abdominal tenderness  There is no guarding or rebound  Hernia: No hernia is present     Musculoskeletal:         General: Normal range of motion  Cervical back: Normal range of motion and neck supple  Lymphadenopathy:      Cervical: No cervical adenopathy  Skin:     General: Skin is warm and dry  Capillary Refill: Capillary refill takes less than 2 seconds  Neurological:      General: No focal deficit present  Mental Status: She is alert and oriented to person, place, and time  Mental status is at baseline  Cranial Nerves: No cranial nerve deficit  Psychiatric:         Mood and Affect: Mood normal          Behavior: Behavior normal          Thought Content: Thought content normal          Judgment: Judgment normal           KONRAD Hanna  [address]  BMI Counseling: Body mass index is 25 54 kg/m²  The BMI is above normal  Nutrition recommendations include decreasing overall calorie intake, 3-5 servings of fruits/vegetables daily, reducing fast food intake, consuming healthier snacks, decreasing soda and/or juice intake and moderation in carbohydrate intake  Exercise recommendations include moderate aerobic physical activity for 150 minutes/week

## 2022-09-23 ENCOUNTER — APPOINTMENT (OUTPATIENT)
Dept: LAB | Facility: HOSPITAL | Age: 47
End: 2022-09-23
Payer: COMMERCIAL

## 2022-09-23 DIAGNOSIS — Z11.59 ENCOUNTER FOR HEPATITIS C SCREENING TEST FOR LOW RISK PATIENT: ICD-10-CM

## 2022-09-23 LAB
ALBUMIN SERPL BCP-MCNC: 3.4 G/DL (ref 3.5–5)
ALP SERPL-CCNC: 59 U/L (ref 46–116)
ALT SERPL W P-5'-P-CCNC: 15 U/L (ref 12–78)
ANION GAP SERPL CALCULATED.3IONS-SCNC: 6 MMOL/L (ref 4–13)
AST SERPL W P-5'-P-CCNC: 11 U/L (ref 5–45)
BASOPHILS # BLD AUTO: 0.03 THOUSANDS/ΜL (ref 0–0.1)
BASOPHILS NFR BLD AUTO: 1 % (ref 0–1)
BILIRUB SERPL-MCNC: 0.64 MG/DL (ref 0.2–1)
BUN SERPL-MCNC: 12 MG/DL (ref 5–25)
CALCIUM ALBUM COR SERPL-MCNC: 9.6 MG/DL (ref 8.3–10.1)
CALCIUM SERPL-MCNC: 9.1 MG/DL (ref 8.3–10.1)
CHLORIDE SERPL-SCNC: 110 MMOL/L (ref 96–108)
CHOLEST SERPL-MCNC: 184 MG/DL
CO2 SERPL-SCNC: 24 MMOL/L (ref 21–32)
CREAT SERPL-MCNC: 0.81 MG/DL (ref 0.6–1.3)
EOSINOPHIL # BLD AUTO: 0.06 THOUSAND/ΜL (ref 0–0.61)
EOSINOPHIL NFR BLD AUTO: 1 % (ref 0–6)
ERYTHROCYTE [DISTWIDTH] IN BLOOD BY AUTOMATED COUNT: 12.9 % (ref 11.6–15.1)
GFR SERPL CREATININE-BSD FRML MDRD: 86 ML/MIN/1.73SQ M
GLUCOSE P FAST SERPL-MCNC: 94 MG/DL (ref 65–99)
HCT VFR BLD AUTO: 41.7 % (ref 34.8–46.1)
HCV AB SER QL: NORMAL
HDLC SERPL-MCNC: 51 MG/DL
HGB BLD-MCNC: 13.6 G/DL (ref 11.5–15.4)
IMM GRANULOCYTES # BLD AUTO: 0 THOUSAND/UL (ref 0–0.2)
IMM GRANULOCYTES NFR BLD AUTO: 0 % (ref 0–2)
LDLC SERPL CALC-MCNC: 112 MG/DL (ref 0–100)
LYMPHOCYTES # BLD AUTO: 2.65 THOUSANDS/ΜL (ref 0.6–4.47)
LYMPHOCYTES NFR BLD AUTO: 45 % (ref 14–44)
MCH RBC QN AUTO: 32 PG (ref 26.8–34.3)
MCHC RBC AUTO-ENTMCNC: 32.6 G/DL (ref 31.4–37.4)
MCV RBC AUTO: 98 FL (ref 82–98)
MONOCYTES # BLD AUTO: 0.45 THOUSAND/ΜL (ref 0.17–1.22)
MONOCYTES NFR BLD AUTO: 8 % (ref 4–12)
NEUTROPHILS # BLD AUTO: 2.73 THOUSANDS/ΜL (ref 1.85–7.62)
NEUTS SEG NFR BLD AUTO: 45 % (ref 43–75)
NRBC BLD AUTO-RTO: 0 /100 WBCS
PLATELET # BLD AUTO: 280 THOUSANDS/UL (ref 149–390)
PMV BLD AUTO: 11 FL (ref 8.9–12.7)
POTASSIUM SERPL-SCNC: 4.3 MMOL/L (ref 3.5–5.3)
PROT SERPL-MCNC: 7 G/DL (ref 6.4–8.4)
RBC # BLD AUTO: 4.25 MILLION/UL (ref 3.81–5.12)
SODIUM SERPL-SCNC: 140 MMOL/L (ref 135–147)
TRIGL SERPL-MCNC: 107 MG/DL
TSH SERPL DL<=0.05 MIU/L-ACNC: 3.46 UIU/ML (ref 0.45–4.5)
WBC # BLD AUTO: 5.92 THOUSAND/UL (ref 4.31–10.16)

## 2022-09-23 PROCEDURE — 80053 COMPREHEN METABOLIC PANEL: CPT

## 2022-09-23 PROCEDURE — 80061 LIPID PANEL: CPT

## 2022-09-23 PROCEDURE — 84443 ASSAY THYROID STIM HORMONE: CPT

## 2022-09-23 PROCEDURE — 36415 COLL VENOUS BLD VENIPUNCTURE: CPT

## 2022-09-23 PROCEDURE — 86803 HEPATITIS C AB TEST: CPT

## 2022-09-23 PROCEDURE — 85025 COMPLETE CBC W/AUTO DIFF WBC: CPT

## 2022-10-14 ENCOUNTER — CLINICAL SUPPORT (OUTPATIENT)
Dept: FAMILY MEDICINE CLINIC | Facility: CLINIC | Age: 47
End: 2022-10-14
Payer: COMMERCIAL

## 2022-10-14 DIAGNOSIS — Z23 NEED FOR COVID-19 VACCINE: Primary | ICD-10-CM

## 2022-10-14 PROCEDURE — 91312 SARSCOV2 VAC BVL 30MCG/0.3ML: CPT

## 2022-10-14 PROCEDURE — 0124A ADM SARSCV2 BVL 30MCG/.3ML B: CPT

## 2022-11-14 DIAGNOSIS — G47.00 INSOMNIA, UNSPECIFIED TYPE: ICD-10-CM

## 2022-11-15 RX ORDER — TRAZODONE HYDROCHLORIDE 50 MG/1
50 TABLET ORAL
Qty: 10 TABLET | Refills: 0 | Status: SHIPPED | OUTPATIENT
Start: 2022-11-15

## 2023-02-06 DIAGNOSIS — F41.1 GENERALIZED ANXIETY DISORDER: ICD-10-CM

## 2023-02-06 RX ORDER — BUSPIRONE HYDROCHLORIDE 10 MG/1
TABLET ORAL
Qty: 270 TABLET | Refills: 1 | Status: SHIPPED | OUTPATIENT
Start: 2023-02-06

## 2023-02-16 ENCOUNTER — OFFICE VISIT (OUTPATIENT)
Dept: FAMILY MEDICINE CLINIC | Facility: CLINIC | Age: 48
End: 2023-02-16

## 2023-02-16 VITALS
BODY MASS INDEX: 25.48 KG/M2 | SYSTOLIC BLOOD PRESSURE: 126 MMHG | HEART RATE: 90 BPM | OXYGEN SATURATION: 98 % | HEIGHT: 70 IN | DIASTOLIC BLOOD PRESSURE: 78 MMHG | WEIGHT: 178 LBS

## 2023-02-16 DIAGNOSIS — R21 SKIN RASH: Primary | ICD-10-CM

## 2023-02-16 NOTE — PROGRESS NOTES
Assessment/Plan:    Skin rash  Symptoms improving  To begin mupirocin every 8 hours  Follow-up if symptoms do not resolve in 1 week or sooner if symptoms worsen  Diagnoses and all orders for this visit:    Skin rash  -     mupirocin (BACTROBAN) 2 % ointment; Apply topically 3 (three) times a day          Subjective:      Patient ID: Maxx Andrews is a 49 y o  female  Ana presents reporting a rash on the right side of her buttocks for the past 6 days  It was painful initially  She is begin applying hydrocortisone cream as well as barrier cream which provided some improvement  Denies new personal care products, history of similar symptoms, or new sexual partners  Rash        The following portions of the patient's history were reviewed and updated as appropriate: She   Patient Active Problem List    Diagnosis Date Noted   • Skin rash 02/16/2023   • Insomnia 09/13/2021   • Generalized anxiety disorder 04/05/2021   • Dense breast 01/15/2019   • Intermittent constipation 05/24/2018     Review of Systems   Constitutional: Negative  Respiratory: Negative  Cardiovascular: Negative  Gastrointestinal: Negative  Musculoskeletal: Negative  Skin: Positive for rash  Neurological: Negative  Psychiatric/Behavioral: Negative  /78   Pulse 90   Ht 5' 10" (1 778 m)   Wt 80 7 kg (178 lb)   SpO2 98%   BMI 25 54 kg/m²     Objective:     Physical Exam  Vitals and nursing note reviewed  Constitutional:       Appearance: Normal appearance  HENT:      Head: Normocephalic and atraumatic  Pulmonary:      Effort: Pulmonary effort is normal    Musculoskeletal:      Cervical back: Neck supple  Skin:         Neurological:      Mental Status: She is alert and oriented to person, place, and time  Psychiatric:         Mood and Affect: Mood normal          Behavior: Behavior normal          Thought Content:  Thought content normal          Judgment: Judgment normal

## 2023-02-16 NOTE — ASSESSMENT & PLAN NOTE
Symptoms improving  To begin mupirocin every 8 hours  Follow-up if symptoms do not resolve in 1 week or sooner if symptoms worsen

## 2023-02-23 NOTE — PROGRESS NOTES
Ana Prakash  1975    Assessment and Plan:  Yearly exam without abnormality      -Pap collected today  We reviewed ASCCP guidelines for Pap testing today  -Mammo slip provided   -OCP refilled  Reviewed continued safety of use given lack of contraindications    RTO one year for yearly exam or sooner as needed  CC:  Yearly exam    S:  49 y o  female here for yearly exam      Yuliana Novak  LMP 2/12/23  Contraception: Larry Correa   Last Pap: 1/2019 NILM/HPV-  Last Mammo: 7/2022 BIRADS1  Last Colonoscopy: 2015 x10yr recall     Non-smoker, social drinker  Exercises Regularly    Her cycles are regular, not heavy or crampy  Sexual activity: She is sexually active without pain, bleeding or dryness  STD testing:  She does not want STD testing today  Family hx of breast cancer: denies  Family hx of ovarian cancer: denies   Family hx of colon cancer: denies     Denies hot flushes, dyspareunia, abnormal uterine bleeding, urinary/fecal incontinence, changes in energy levels, mood         Current Outpatient Medications:   •  busPIRone (BUSPAR) 10 mg tablet, TAKE 1 TABLET BY MOUTH THREE TIMES A DAY, Disp: 270 tablet, Rfl: 1  •  fluocinolone (SYNALAR) 0 025 % ointment, APPLY TO AFFECTED AREA ON THE EYELIDS TWICE A DAY X 7 DAYS MAX AS NEEDED, Disp: , Rfl:   •  fluticasone (FLONASE) 50 mcg/act nasal spray, 1 spray into each nostril daily, Disp: , Rfl:   •  levonorgestrel-ethinyl estradiol (Altavera) 0 15-30 MG-MCG per tablet, Take 1 tablet by mouth daily for 28 days, Disp: 84 tablet, Rfl: 4  •  Multiple Vitamins-Iron (DAILY MULTIPLE VITAMIN/IRON) TABS, Take by mouth, Disp: , Rfl:   •  mupirocin (BACTROBAN) 2 % ointment, Apply topically 3 (three) times a day, Disp: 22 g, Rfl: 0  •  Probiotic Product (PROBIOTIC-10 PO), Take by mouth, Disp: , Rfl:   •  QuickVue At-Home Covid-19 Test KIT, REFER TO PACKAGE INSTRUCTIONS INCLUDED IN BOX, Disp: , Rfl:   •  traZODone (DESYREL) 50 mg tablet, Take 1 tablet (50 mg total) by mouth daily at bedtime as needed for sleep, Disp: 10 tablet, Rfl: 0  Social History     Socioeconomic History   • Marital status: /Civil Union     Spouse name: Not on file   • Number of children: Not on file   • Years of education: Not on file   • Highest education level: Not on file   Occupational History   • Not on file   Tobacco Use   • Smoking status: Never     Passive exposure: Never   • Smokeless tobacco: Never   Vaping Use   • Vaping Use: Never used   Substance and Sexual Activity   • Alcohol use: Yes     Comment: social   • Drug use: No   • Sexual activity: Yes     Birth control/protection: Male Sterilization, Pill   Other Topics Concern   • Not on file   Social History Narrative    1 cup tea/day- switched recently to decaf    Exercise - walking    Exercises moderately less than 3 times/wk     Social Determinants of Health     Financial Resource Strain: Not on file   Food Insecurity: Not on file   Transportation Needs: Not on file   Physical Activity: Not on file   Stress: Not on file   Social Connections: Not on file   Intimate Partner Violence: Not on file   Housing Stability: Not on file     Family History   Problem Relation Age of Onset   • No Known Problems Mother    • Leukemia Father    • No Known Problems Sister    • No Known Problems Daughter    • Dementia Maternal Grandmother    • Parkinsonism Maternal Grandmother    • Stroke Maternal Grandmother    • Lymphoma Maternal Grandmother    • No Known Problems Maternal Aunt    • No Known Problems Paternal Aunt    • Esophageal cancer Family    • Breast cancer Neg Hx       Past Medical History:   Diagnosis Date   • Inability to conceive, female         Review of Systems   Respiratory: Negative  Cardiovascular: Negative  Gastrointestinal: Negative for constipation and diarrhea  Genitourinary: Negative for difficulty urinating, pelvic pain, vaginal bleeding, vaginal discharge, itching or odor      O:  Blood pressure 118/80, height 5' 10" (1 778 m), weight 83 9 kg (185 lb), last menstrual period 02/12/2023, not currently breastfeeding  Patient appears well and is not in distress  Neck is supple without masses  Breasts are symmetrical without mass, tenderness, nipple discharge, skin changes or adenopathy  Abdomen is soft and nontender without masses  External genitals are normal without lesions or rashes  Buttocks with healing ulcerations  Urethral meatus and urethra are normal  Bladder is normal to palpation  Vagina is normal without discharge or bleeding  Cervix is normal without discharge or lesion  Ectropion  Uterus is normal, mobile, nontender without palpable mass  Adnexa are normal, nontender, without palpable mass

## 2023-02-27 ENCOUNTER — ANNUAL EXAM (OUTPATIENT)
Dept: OBGYN CLINIC | Age: 48
End: 2023-02-27

## 2023-02-27 VITALS
HEIGHT: 70 IN | WEIGHT: 185 LBS | DIASTOLIC BLOOD PRESSURE: 80 MMHG | SYSTOLIC BLOOD PRESSURE: 118 MMHG | BODY MASS INDEX: 26.48 KG/M2

## 2023-02-27 DIAGNOSIS — Z12.31 ENCOUNTER FOR SCREENING MAMMOGRAM FOR MALIGNANT NEOPLASM OF BREAST: ICD-10-CM

## 2023-02-27 DIAGNOSIS — Z01.419 ENCOUNTER FOR GYNECOLOGICAL EXAMINATION (GENERAL) (ROUTINE) WITHOUT ABNORMAL FINDINGS: Primary | ICD-10-CM

## 2023-02-27 DIAGNOSIS — Z30.41 ENCOUNTER FOR SURVEILLANCE OF CONTRACEPTIVE PILLS: ICD-10-CM

## 2023-02-27 DIAGNOSIS — N92.0 MENORRHAGIA WITH REGULAR CYCLE: ICD-10-CM

## 2023-02-27 RX ORDER — LEVONORGESTREL AND ETHINYL ESTRADIOL 0.15-0.03
1 KIT ORAL DAILY
Qty: 84 TABLET | Refills: 4 | Status: SHIPPED | OUTPATIENT
Start: 2023-02-27 | End: 2023-03-27

## 2023-03-06 LAB
LAB AP GYN PRIMARY INTERPRETATION: NORMAL
Lab: NORMAL

## 2023-03-09 ENCOUNTER — RA CDI HCC (OUTPATIENT)
Dept: OTHER | Facility: HOSPITAL | Age: 48
End: 2023-03-09

## 2023-03-09 NOTE — PROGRESS NOTES
[facility]  coding opportunities       Chart reviewed, no opportunity found: CHART REVIEWED, NO OPPORTUNITY FOUND   This is a reminder to address ALL HCC (risk adjustment) codes as found on active problem list for 2023 as patient scores reset to zero SHANNAN         Patients Insurance        Commercial Insurance: [address] [place] [address]

## 2023-03-16 ENCOUNTER — OFFICE VISIT (OUTPATIENT)
Dept: FAMILY MEDICINE CLINIC | Facility: CLINIC | Age: 48
End: 2023-03-16

## 2023-03-16 VITALS
HEART RATE: 74 BPM | SYSTOLIC BLOOD PRESSURE: 122 MMHG | TEMPERATURE: 97.2 F | WEIGHT: 178 LBS | HEIGHT: 70 IN | DIASTOLIC BLOOD PRESSURE: 80 MMHG | BODY MASS INDEX: 25.48 KG/M2 | OXYGEN SATURATION: 99 %

## 2023-03-16 DIAGNOSIS — F41.1 GENERALIZED ANXIETY DISORDER: Primary | ICD-10-CM

## 2023-03-16 DIAGNOSIS — R21 SKIN RASH: ICD-10-CM

## 2023-03-16 DIAGNOSIS — G47.00 INSOMNIA, UNSPECIFIED TYPE: ICD-10-CM

## 2023-03-16 NOTE — ASSESSMENT & PLAN NOTE
Patient reports a significant increase in stress at work leading to an increase in her anxiety  To continue buspirone  Advised to add sertraline at half tab x1 week then 1 tab daily  Follow-up in 4 weeks or sooner if needed

## 2023-03-16 NOTE — PROGRESS NOTES
Assessment/Plan:    Skin rash  Significantly improved but not fully resolved  Counseled patient on the fact that this could be shingles outbreak  Advised to monitor for any return of symptoms, work on self-care and stress reduction  Insomnia  To continue trazodone 50 mg as needed for sleep  To continue proper sleep hygiene  Generalized anxiety disorder  Patient reports a significant increase in stress at work leading to an increase in her anxiety  To continue buspirone  Advised to add sertraline at half tab x1 week then 1 tab daily  Follow-up in 4 weeks or sooner if needed  Diagnoses and all orders for this visit:    Generalized anxiety disorder  -     sertraline (Zoloft) 50 mg tablet; Take 1/2 tab x 1 week, then 1 tab daily    Insomnia, unspecified type    Skin rash          Subjective:      Patient ID: Anika Jacques is a 49 y o  female  Ana presents for a follow-up  She reports a recent increase in anxiety and agitation due to work stress  She has been taking her buspirone which is helpful but not as much so due to the recent increase in stress  She continues to have difficulty sleeping  She will take trazodone at times which is helpful        The following portions of the patient's history were reviewed and updated as appropriate: She   Patient Active Problem List    Diagnosis Date Noted   • Skin rash 02/16/2023   • Insomnia 09/13/2021   • Generalized anxiety disorder 04/05/2021   • Dense breast 01/15/2019   • Intermittent constipation 05/24/2018     Current Outpatient Medications   Medication Sig Dispense Refill   • busPIRone (BUSPAR) 10 mg tablet TAKE 1 TABLET BY MOUTH THREE TIMES A  tablet 1   • fluocinolone (SYNALAR) 0 025 % ointment APPLY TO AFFECTED AREA ON THE EYELIDS TWICE A DAY X 7 DAYS MAX AS NEEDED     • fluticasone (FLONASE) 50 mcg/act nasal spray 1 spray into each nostril daily     • levonorgestrel-ethinyl estradiol (Altavera) 0 15-30 MG-MCG per tablet Take 1 tablet by mouth daily for 28 days 84 tablet 4   • Multiple Vitamins-Iron (DAILY MULTIPLE VITAMIN/IRON) TABS Take by mouth     • mupirocin (BACTROBAN) 2 % ointment Apply topically 3 (three) times a day 22 g 0   • Probiotic Product (PROBIOTIC-10 PO) Take by mouth     • QuickVue At-Home Covid-19 Test KIT REFER TO PACKAGE INSTRUCTIONS INCLUDED IN BOX     • sertraline (Zoloft) 50 mg tablet Take 1/2 tab x 1 week, then 1 tab daily 30 tablet 1   • traZODone (DESYREL) 50 mg tablet Take 1 tablet (50 mg total) by mouth daily at bedtime as needed for sleep 10 tablet 0     No current facility-administered medications for this visit  She is allergic to mold extract [trichophyton] and pollen extract       Review of Systems   Constitutional: Negative  Respiratory: Negative  Cardiovascular: Negative  Gastrointestinal: Negative  Psychiatric/Behavioral: Positive for agitation, dysphoric mood and sleep disturbance  Negative for self-injury and suicidal ideas  The patient is nervous/anxious and is hyperactive  /80   Pulse 74   Temp (!) 97 2 °F (36 2 °C)   Ht 5' 10" (1 778 m)   Wt 80 7 kg (178 lb)   SpO2 99%   BMI 25 54 kg/m²     Objective:     Physical Exam  Vitals and nursing note reviewed  Constitutional:       General: She is not in acute distress  Appearance: Normal appearance  She is well-developed  She is not ill-appearing, toxic-appearing or diaphoretic  HENT:      Head: Normocephalic and atraumatic  Eyes:      Conjunctiva/sclera: Conjunctivae normal    Cardiovascular:      Rate and Rhythm: Normal rate and regular rhythm  Heart sounds: Normal heart sounds  No murmur heard  Pulmonary:      Effort: Pulmonary effort is normal  No respiratory distress  Breath sounds: Normal breath sounds  No wheezing or rales  Chest:      Chest wall: No tenderness  Musculoskeletal:      Cervical back: Neck supple  Neurological:      General: No focal deficit present        Mental Status: She is alert and oriented to person, place, and time  Psychiatric:         Mood and Affect: Mood is anxious  Speech: Speech normal          Behavior: Behavior normal          Thought Content: Thought content normal          Judgment: Judgment normal            BMI Counseling: Body mass index is 25 54 kg/m²  The BMI is above normal  Nutrition recommendations include decreasing overall calorie intake, 3-5 servings of fruits/vegetables daily, reducing fast food intake, consuming healthier snacks, decreasing soda and/or juice intake, moderation in carbohydrate intake and increasing intake of lean protein  Exercise recommendations include exercising 3-5 times per week

## 2023-03-16 NOTE — ASSESSMENT & PLAN NOTE
Significantly improved but not fully resolved  Counseled patient on the fact that this could be shingles outbreak  Advised to monitor for any return of symptoms, work on self-care and stress reduction

## 2023-04-07 DIAGNOSIS — F41.1 GENERALIZED ANXIETY DISORDER: ICD-10-CM

## 2023-04-17 PROBLEM — R21 SKIN RASH: Status: RESOLVED | Noted: 2023-02-16 | Resolved: 2023-04-17

## 2023-05-11 ENCOUNTER — OFFICE VISIT (OUTPATIENT)
Dept: FAMILY MEDICINE CLINIC | Facility: CLINIC | Age: 48
End: 2023-05-11

## 2023-05-11 VITALS
BODY MASS INDEX: 25.34 KG/M2 | DIASTOLIC BLOOD PRESSURE: 78 MMHG | OXYGEN SATURATION: 98 % | HEIGHT: 70 IN | SYSTOLIC BLOOD PRESSURE: 110 MMHG | WEIGHT: 177 LBS | TEMPERATURE: 98.3 F | HEART RATE: 78 BPM

## 2023-05-11 DIAGNOSIS — G47.00 INSOMNIA, UNSPECIFIED TYPE: ICD-10-CM

## 2023-05-11 DIAGNOSIS — F41.1 GENERALIZED ANXIETY DISORDER: ICD-10-CM

## 2023-05-11 DIAGNOSIS — J02.0 STREP PHARYNGITIS: Primary | ICD-10-CM

## 2023-05-11 DIAGNOSIS — N93.9 VAGINAL BLEEDING: ICD-10-CM

## 2023-05-11 LAB — S PYO AG THROAT QL: POSITIVE

## 2023-05-11 RX ORDER — AMOXICILLIN 500 MG/1
500 TABLET, FILM COATED ORAL 2 TIMES DAILY
Qty: 20 TABLET | Refills: 0 | Status: SHIPPED | OUTPATIENT
Start: 2023-05-11 | End: 2023-05-21

## 2023-05-11 NOTE — ASSESSMENT & PLAN NOTE
Anxiety is well controlled on buspirone 10 mg TID and sertraline 50 mg but patient is experiencing sexual side effects  Advised to decrease to 25 mg daily  To follow-up if symptoms persist in the next 4 weeks

## 2023-05-11 NOTE — PROGRESS NOTES
Assessment/Plan:    Insomnia  Stable  To continue trazodone as needed  Generalized anxiety disorder  Anxiety is well controlled on buspirone 10 mg TID and sertraline 50 mg but patient is experiencing sexual side effects  Advised to decrease to 25 mg daily  To follow-up if symptoms persist in the next 4 weeks  Strep pharyngitis  Rapid strep positive while in office  To begin amoxicillin 500 mg every 12 hours for 10 days  Counseled on gargling with salt water, Tylenol/Motrin for fever/pain, and beginning soft diet  To follow-up if no improvement in 1 week or sooner if symptoms worsen  Diagnoses and all orders for this visit:    Strep pharyngitis  -     POCT rapid strepA  -     amoxicillin (AMOXIL) 500 MG tablet; Take 1 tablet (500 mg total) by mouth 2 (two) times a day for 10 days    Vaginal bleeding  Comments:  Patient experiencing vaginal bleeding  To proceed with [facility] u/s  Will call with results  Orders:  -     US pelvis complete non OB; Future    Generalized anxiety disorder    Insomnia, unspecified type          Subjective:      Patient ID: Marcus Madrigal is a 49 y o  female  Ana presents for a follow-up  She is experiencing a decrease in libido and difficulty achieving an orgasm on sertraline  She feels as though her anxiety is well controlled  She also recently developed a sore throat  Denies fever, chills, sick contacts or difficulty swallowing  Her COVID test was negative this morning  Ana also reports breakthrough vaginal bleeding the past 2 months  She is currently on her menses  She is taking oral contraceptives and has not missed any pills        The following portions of the patient's history were reviewed and updated as appropriate: She   Patient Active Problem List    Diagnosis Date Noted   • Strep pharyngitis 05/11/2023   • Insomnia 09/13/2021   • Generalized anxiety disorder 04/05/2021   • Dense breast 01/15/2019   • Intermittent constipation 05/24/2018 "    Current Outpatient Medications   Medication Sig Dispense Refill   • amoxicillin (AMOXIL) 500 MG tablet Take 1 tablet (500 mg total) by mouth 2 (two) times a day for 10 days 20 tablet 0   • busPIRone (BUSPAR) 10 mg tablet TAKE 1 TABLET BY MOUTH THREE TIMES A  tablet 1   • fluocinolone (SYNALAR) 0 025 % ointment APPLY TO AFFECTED AREA ON THE EYELIDS TWICE A DAY X 7 DAYS MAX AS NEEDED     • fluticasone (FLONASE) 50 mcg/act nasal spray 1 spray into each nostril daily     • Multiple Vitamins-Iron (DAILY MULTIPLE VITAMIN/IRON) TABS Take by mouth     • mupirocin (BACTROBAN) 2 % ointment Apply topically 3 (three) times a day 22 g 0   • Probiotic Product (PROBIOTIC-10 PO) Take by mouth     • QuickVue At-Home Covid-19 Test KIT REFER TO PACKAGE INSTRUCTIONS INCLUDED IN BOX     • sertraline (ZOLOFT) 50 mg tablet TAKE 1/2 TAB X 1 WEEK, THEN 1 TAB DAILY 90 tablet 1   • traZODone (DESYREL) 50 mg tablet Take 1 tablet (50 mg total) by mouth daily at bedtime as needed for sleep 90 tablet 1   • levonorgestrel-ethinyl estradiol (Altavera) 0 15-30 MG-MCG per tablet Take 1 tablet by mouth daily for 28 days 84 tablet 4     No current facility-administered medications for this visit  She is allergic to mold extract [trichophyton] and pollen extract       Review of Systems   Constitutional: Positive for fatigue  HENT: Positive for sore throat  Respiratory: Negative  Cardiovascular: Negative  Gastrointestinal: Negative  Genitourinary: Positive for vaginal bleeding  Decreased libido   Musculoskeletal: Negative  Neurological: Negative  Psychiatric/Behavioral: Negative  /78 (BP Location: Left arm, Patient Position: Sitting, Cuff Size: Standard)   Pulse 78   Temp 98 3 °F (36 8 °C) (Tympanic)   Ht 5' 10\" (1 778 m)   Wt 80 3 kg (177 lb)   SpO2 98%   BMI 25 40 kg/m²     Objective:     Physical Exam  Vitals and nursing note reviewed     Constitutional:       General: She is not in acute " distress  Appearance: Normal appearance  She is well-developed  She is not ill-appearing, toxic-appearing or diaphoretic  HENT:      Head: Normocephalic and atraumatic  Right Ear: Tympanic membrane, ear canal and external ear normal       Left Ear: Tympanic membrane, ear canal and external ear normal       Nose: Nose normal       Mouth/Throat:      Mouth: Mucous membranes are moist       Pharynx: Oropharyngeal exudate and posterior oropharyngeal erythema present  Eyes:      Conjunctiva/sclera: Conjunctivae normal    Cardiovascular:      Rate and Rhythm: Normal rate and regular rhythm  Heart sounds: Normal heart sounds  No murmur heard  Pulmonary:      Effort: Pulmonary effort is normal  No respiratory distress  Breath sounds: Normal breath sounds  No wheezing or rales  Chest:      Chest wall: No tenderness  Musculoskeletal:      Cervical back: Neck supple  Lymphadenopathy:      Cervical: Cervical adenopathy present  Neurological:      General: No focal deficit present  Mental Status: She is alert and oriented to person, place, and time  Psychiatric:         Mood and Affect: Mood normal          Behavior: Behavior normal          Thought Content:  Thought content normal          Judgment: Judgment normal

## 2023-05-11 NOTE — ASSESSMENT & PLAN NOTE
Rapid strep positive while in office  To begin amoxicillin 500 mg every 12 hours for 10 days  Counseled on gargling with salt water, Tylenol/Motrin for fever/pain, and beginning soft diet  To follow-up if no improvement in 1 week or sooner if symptoms worsen

## 2023-05-22 ENCOUNTER — OFFICE VISIT (OUTPATIENT)
Dept: FAMILY MEDICINE CLINIC | Facility: CLINIC | Age: 48
End: 2023-05-22

## 2023-05-22 VITALS
WEIGHT: 177.8 LBS | HEART RATE: 69 BPM | DIASTOLIC BLOOD PRESSURE: 82 MMHG | TEMPERATURE: 98 F | HEIGHT: 70 IN | OXYGEN SATURATION: 100 % | BODY MASS INDEX: 25.45 KG/M2 | SYSTOLIC BLOOD PRESSURE: 128 MMHG

## 2023-05-22 DIAGNOSIS — M65.4 DE QUERVAIN'S TENOSYNOVITIS, RIGHT: Primary | ICD-10-CM

## 2023-05-22 PROBLEM — J02.0 STREP PHARYNGITIS: Status: RESOLVED | Noted: 2023-05-11 | Resolved: 2023-05-22

## 2023-05-22 RX ORDER — NAPROXEN 500 MG/1
500 TABLET ORAL 2 TIMES DAILY WITH MEALS
Qty: 20 TABLET | Refills: 0 | Status: SHIPPED | OUTPATIENT
Start: 2023-05-22

## 2023-05-22 NOTE — PROGRESS NOTES
Assessment/Plan:    De Quervain's tenosynovitis, right  Reviewed diagnosis with patient  To begin naproxen 1 tablet every 12 hours  Counseled on wearing splint, application of ice, and resting wrist   To follow-up if no improvement in 2 weeks or sooner if symptoms worsen  Diagnoses and all orders for this visit:    De Quervain's tenosynovitis, right  -     Elastic Bandages & Supports (Thumb Splint/Right Medium) MISC; Use in the morning Please dispense right thumb spica  -     naproxen (Naprosyn) 500 mg tablet; Take 1 tablet (500 mg total) by mouth 2 (two) times a day with meals          Subjective:      Patient ID: Lajuanda Mortimer is a 49 y o  female  Ana presents reporting right wrist pain and mild swelling for the past 2 weeks  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 and was lifting a lot of boxes and doing a lot of repetitive movement shins  She tried Advil twice which provided minimal relief  Denies paresthesias, recent/prior injury, or decrease in range of motion        The following portions of the patient's history were reviewed and updated as appropriate: She   Patient Active Problem List    Diagnosis Date Noted   • De Quervain's tenosynovitis, right 05/22/2023   • Insomnia 09/13/2021   • Generalized anxiety disorder 04/05/2021   • Dense breast 01/15/2019   • Intermittent constipation 05/24/2018     Current Outpatient Medications   Medication Sig Dispense Refill   • busPIRone (BUSPAR) 10 mg tablet TAKE 1 TABLET BY MOUTH THREE TIMES A  tablet 1   • Elastic Bandages & Supports (Thumb Splint/Right Medium) MISC Use in the morning Please dispense right thumb spica 1 each 0   • fluocinolone (SYNALAR) 0 025 % ointment APPLY TO AFFECTED AREA ON THE EYELIDS TWICE A DAY X 7 DAYS MAX AS NEEDED     • fluticasone (FLONASE) 50 mcg/act nasal spray 1 spray into each nostril daily     • Multiple Vitamins-Iron (DAILY MULTIPLE VITAMIN/IRON) TABS Take by mouth     • mupirocin (BACTROBAN) 2 % "ointment Apply topically 3 (three) times a day 22 g 0   • naproxen (Naprosyn) 500 mg tablet Take 1 tablet (500 mg total) by mouth 2 (two) times a day with meals 20 tablet 0   • Probiotic Product (PROBIOTIC-10 PO) Take by mouth     • QuickVue At-Home Covid-19 Test KIT REFER TO PACKAGE INSTRUCTIONS INCLUDED IN BOX     • sertraline (ZOLOFT) 50 mg tablet TAKE 1/2 TAB X 1 WEEK, THEN 1 TAB DAILY 90 tablet 1   • traZODone (DESYREL) 50 mg tablet Take 1 tablet (50 mg total) by mouth daily at bedtime as needed for sleep 90 tablet 1   • levonorgestrel-ethinyl estradiol (Altavera) 0 15-30 MG-MCG per tablet Take 1 tablet by mouth daily for 28 days 84 tablet 4     No current facility-administered medications for this visit  She is allergic to mold extract [trichophyton] and pollen extract       Review of Systems   Constitutional: Negative  HENT: Negative  Respiratory: Negative  Cardiovascular: Negative  Gastrointestinal: Negative  Musculoskeletal:        Right wrist pain   Neurological: Negative  Negative for numbness  Psychiatric/Behavioral: Negative  /82 (BP Location: Left arm, Patient Position: Sitting, Cuff Size: Standard)   Pulse 69   Temp 98 °F (36 7 °C) (Tympanic)   Ht 5' 10\" (1 778 m)   Wt 80 6 kg (177 lb 12 8 oz)   SpO2 100%   BMI 25 51 kg/m²     Objective:     Physical Exam  Vitals and nursing note reviewed  Constitutional:       General: She is not in acute distress  Appearance: Normal appearance  She is well-developed  She is not ill-appearing or diaphoretic  HENT:      Head: Normocephalic and atraumatic  Eyes:      Conjunctiva/sclera: Conjunctivae normal    Cardiovascular:      Rate and Rhythm: Normal rate and regular rhythm  Heart sounds: Normal heart sounds  No murmur heard  Pulmonary:      Effort: Pulmonary effort is normal  No respiratory distress  Breath sounds: Normal breath sounds  No wheezing or rales  Chest:      Chest wall: No tenderness   " Musculoskeletal:      Cervical back: Neck supple  Comments: No obvious deformity in right wrist   Positive Finkelstein test    Neurological:      General: No focal deficit present  Mental Status: She is alert and oriented to person, place, and time  Psychiatric:         Mood and Affect: Mood normal          Behavior: Behavior normal          Thought Content:  Thought content normal          Judgment: Judgment normal

## 2023-05-22 NOTE — ASSESSMENT & PLAN NOTE
Reviewed diagnosis with patient  To begin naproxen 1 tablet every 12 hours  Counseled on wearing splint, application of ice, and resting wrist   To follow-up if no improvement in 2 weeks or sooner if symptoms worsen

## 2023-08-04 ENCOUNTER — HOSPITAL ENCOUNTER (OUTPATIENT)
Age: 48
Discharge: HOME | End: 2023-08-04
Payer: COMMERCIAL

## 2023-08-04 VITALS — WEIGHT: 177 LBS | HEIGHT: 70 IN | BODY MASS INDEX: 25.34 KG/M2

## 2023-08-04 DIAGNOSIS — Z12.31 ENCOUNTER FOR SCREENING MAMMOGRAM FOR MALIGNANT NEOPLASM OF BREAST: ICD-10-CM

## 2023-08-04 PROCEDURE — 77067 SCR MAMMO BI INCL CAD: CPT

## 2023-08-04 PROCEDURE — 77063 BREAST TOMOSYNTHESIS BI: CPT

## 2023-08-05 DIAGNOSIS — F41.1 GENERALIZED ANXIETY DISORDER: ICD-10-CM

## 2023-08-07 RX ORDER — BUSPIRONE HYDROCHLORIDE 10 MG/1
TABLET ORAL
Qty: 270 TABLET | Refills: 1 | Status: SHIPPED | OUTPATIENT
Start: 2023-08-07

## 2023-08-10 ENCOUNTER — RA CDI HCC (OUTPATIENT)
Dept: OTHER | Facility: HOSPITAL | Age: 48
End: 2023-08-10

## 2023-08-10 NOTE — PROGRESS NOTES
[facility] [address] [facility] coding opportunities       Chart reviewed, no opportunity found: CHART REVIEWED, NO OPPORTUNITY FOUND        Patients Insurance        Commercial Insurance: [employer] Rivera

## 2023-08-17 ENCOUNTER — OFFICE VISIT (OUTPATIENT)
Dept: FAMILY MEDICINE CLINIC | Facility: CLINIC | Age: 48
End: 2023-08-17
Payer: COMMERCIAL

## 2023-08-17 VITALS
OXYGEN SATURATION: 98 % | HEART RATE: 74 BPM | WEIGHT: 187.2 LBS | HEIGHT: 70 IN | SYSTOLIC BLOOD PRESSURE: 122 MMHG | TEMPERATURE: 98 F | BODY MASS INDEX: 26.8 KG/M2 | DIASTOLIC BLOOD PRESSURE: 80 MMHG

## 2023-08-17 DIAGNOSIS — H91.93 DECREASED HEARING OF BOTH EARS: ICD-10-CM

## 2023-08-17 DIAGNOSIS — G47.00 INSOMNIA, UNSPECIFIED TYPE: ICD-10-CM

## 2023-08-17 DIAGNOSIS — F41.1 GENERALIZED ANXIETY DISORDER: Primary | ICD-10-CM

## 2023-08-17 PROCEDURE — 99214 OFFICE O/P EST MOD 30 MIN: CPT | Performed by: NURSE PRACTITIONER

## 2023-08-17 RX ORDER — TRAZODONE HYDROCHLORIDE 50 MG/1
50 TABLET ORAL
Qty: 90 TABLET | Refills: 1 | Status: SHIPPED | OUTPATIENT
Start: 2023-08-17

## 2023-08-17 NOTE — PROGRESS NOTES
Assessment/Plan:    Generalized anxiety disorder  Stable. To continue sertraline 50 mg daily. Follow-up in 6 months or sooner if needed. Insomnia  Stable. To continue trazodone 50 mg as needed for sleep. Diagnoses and all orders for this visit:    Generalized anxiety disorder  -     sertraline (ZOLOFT) 50 mg tablet; Take 1 tablet (50 mg total) by mouth daily    Insomnia, unspecified type  -     traZODone (DESYREL) 50 mg tablet; Take 1 tablet (50 mg total) by mouth daily at bedtime as needed for sleep    Decreased hearing of both ears  -     Ambulatory Referral to Audiology; Future          Subjective:      Patient ID: Marisa Osei is a 49 y.o. female. Ana presents for a follow-up. She feels as though her anxiety is stable on current dose of sertraline. She is sleeping well on trazodone 50 mg as needed. She does note difficulty hearing.   Requesting to be referred to an audiologist.      The following portions of the patient's history were reviewed and updated as appropriate:   She   Patient Active Problem List    Diagnosis Date Noted   • De Quervain's tenosynovitis, right 05/22/2023   • Insomnia 09/13/2021   • Generalized anxiety disorder 04/05/2021   • Dense breast 01/15/2019   • Intermittent constipation 05/24/2018     Current Outpatient Medications   Medication Sig Dispense Refill   • busPIRone (BUSPAR) 10 mg tablet TAKE 1 TABLET BY MOUTH THREE TIMES A  tablet 1   • Elastic Bandages & Supports (Thumb Splint/Right Medium) MISC Use in the morning Please dispense right thumb spica 1 each 0   • fluocinolone (SYNALAR) 0.025 % ointment APPLY TO AFFECTED AREA ON THE EYELIDS TWICE A DAY X 7 DAYS MAX AS NEEDED     • fluticasone (FLONASE) 50 mcg/act nasal spray 1 spray into each nostril daily     • Multiple Vitamins-Iron (DAILY MULTIPLE VITAMIN/IRON) TABS Take by mouth     • Probiotic Product (PROBIOTIC-10 PO) Take by mouth     • sertraline (ZOLOFT) 50 mg tablet Take 1 tablet (50 mg total) by mouth daily 90 tablet 1   • traZODone (DESYREL) 50 mg tablet Take 1 tablet (50 mg total) by mouth daily at bedtime as needed for sleep 90 tablet 1   • levonorgestrel-ethinyl estradiol (Altavera) 0.15-30 MG-MCG per tablet Take 1 tablet by mouth daily for 28 days 84 tablet 4     No current facility-administered medications for this visit. She is allergic to mold extract [trichophyton] and pollen extract. .    Review of Systems   Constitutional: Negative. HENT: Positive for hearing loss. Eyes: Negative. Respiratory: Negative. Cardiovascular: Negative. Gastrointestinal: Negative. Endocrine: Negative. Genitourinary: Negative. Musculoskeletal: Negative. Skin: Negative. Allergic/Immunologic: Negative. Neurological: Negative. Hematological: Negative. Psychiatric/Behavioral: Negative. /80 (BP Location: Left arm, Patient Position: Sitting, Cuff Size: Standard)   Pulse 74   Temp 98 °F (36.7 °C) (Tympanic)   Ht 5' 10" (1.778 m)   Wt 84.9 kg (187 lb 3.2 oz)   LMP 08/01/2023 (Exact Date)   SpO2 98%   BMI 26.86 kg/m²     Objective:     Physical Exam  Vitals and nursing note reviewed. Constitutional:       General: She is not in acute distress. Appearance: Normal appearance. She is well-developed and normal weight. She is not ill-appearing, toxic-appearing or diaphoretic. HENT:      Head: Normocephalic and atraumatic. Right Ear: Tympanic membrane, ear canal and external ear normal. There is no impacted cerumen. Left Ear: Tympanic membrane, ear canal and external ear normal. There is no impacted cerumen. Eyes:      Conjunctiva/sclera: Conjunctivae normal.   Cardiovascular:      Rate and Rhythm: Normal rate and regular rhythm. Heart sounds: Normal heart sounds. No murmur heard. Pulmonary:      Effort: Pulmonary effort is normal. No respiratory distress. Breath sounds: Normal breath sounds. No wheezing or rales.    Chest:      Chest wall: No tenderness. Musculoskeletal:      Cervical back: Neck supple. Lymphadenopathy:      Cervical: No cervical adenopathy. Neurological:      General: No focal deficit present. Mental Status: She is alert and oriented to person, place, and time. Psychiatric:         Mood and Affect: Mood normal.         Behavior: Behavior normal.         Thought Content:  Thought content normal.         Judgment: Judgment normal.

## 2023-08-25 ENCOUNTER — OFFICE VISIT (OUTPATIENT)
Age: 48
End: 2023-08-25
Payer: COMMERCIAL

## 2023-08-25 VITALS
BODY MASS INDEX: 26.94 KG/M2 | SYSTOLIC BLOOD PRESSURE: 108 MMHG | TEMPERATURE: 97 F | DIASTOLIC BLOOD PRESSURE: 78 MMHG | HEART RATE: 79 BPM | OXYGEN SATURATION: 98 % | RESPIRATION RATE: 18 BRPM | WEIGHT: 188.2 LBS | HEIGHT: 70 IN

## 2023-08-25 DIAGNOSIS — S91.002A ANKLE WOUND, LEFT, INITIAL ENCOUNTER: Primary | ICD-10-CM

## 2023-08-25 PROCEDURE — 99213 OFFICE O/P EST LOW 20 MIN: CPT

## 2023-08-25 RX ORDER — CEPHALEXIN 500 MG/1
500 CAPSULE ORAL EVERY 6 HOURS SCHEDULED
Qty: 28 CAPSULE | Refills: 0 | Status: SHIPPED | OUTPATIENT
Start: 2023-08-25 | End: 2023-09-01

## 2023-08-25 NOTE — PATIENT INSTRUCTIONS
Take antibiotics as directed for next 7 days. Take medications with food to avoid upset stomach and complete entire course of therapy even if symptoms improve. Keep area covered with band-aid while healing. May take tylenol/ibuprofen every 4-6 hours as needed for pain, swelling, or fever. Follow-up with PCP in 3-5 days if no improvement of symptoms. Report to ER if symptoms worsen or develop worsening pain, redness, or swelling around site of injury.

## 2023-08-25 NOTE — PROGRESS NOTES
[facility]        NAME: Robyn Agustin is a 49 y.o. female  : 1975    MRN: [id number]  DATE: 2023  TIME: 8:39 AM    Assessment and Plan   Ankle wound, left, initial encounter [S91.002A]  1. Ankle wound, left, initial encounter  cephalexin (KEFLEX) 500 mg capsule        Will give a week of antibiotics as patient did not respond to topical treatment. Educated on use of tylenol/ibuprofen for pain and swelling. Advised patient to follow-up with PCP once complete antibiotics if symptoms persist as further management by wound care (debridement) may be necessary or to report to the ER sooner if symptoms worsen. Patient verbalizes understanding and agreeable to plan. Patient Instructions     Take antibiotics as directed for next 7 days. Take medications with food to avoid upset stomach and complete entire course of therapy even if symptoms improve. Keep area covered with band-aid while healing. May take tylenol/ibuprofen every 4-6 hours as needed for pain, swelling, or fever. Follow-up with PCP in 3-5 days if no improvement of symptoms. Report to ER if symptoms worsen or develop worsening pain, redness, or swelling around site of injury. Chief Complaint     Chief Complaint   Patient presents with   • Cut on leg      Cut on left ankle. Stated she injured herself two weeks ago. History of Present Illness       49year old female presents for evaluation of wound to left ankle. She relates she moving plywood when she lost control of the material and it struck her left ankle. She denies associated fevers, chills, fatigue, body aches, or drainage but reports some pain and swelling most noticeable after being on her feet all day. She has been applying neosporin and keeping the wound covered with a band-aid for the past two weeks but relates her symptoms don't seem to be improving. She in unsure if her tetanus is up-to-date. She has not tried any other interventions for symptoms.     Wound Check  She was originally treated 10 to 14 days ago. Previous treatment included wound cleansing or irrigation. Her temperature was unmeasured prior to arrival. There has been no drainage from the wound. There is new redness present. There is new swelling present. There is new pain present. She has no difficulty moving the affected extremity or digit. Review of Systems   Review of Systems   Constitutional: Negative for activity change, appetite change, chills, fatigue and fever. Respiratory: Negative for chest tightness and shortness of breath. Cardiovascular: Negative for chest pain and palpitations. Gastrointestinal: Negative for abdominal pain, constipation, diarrhea, nausea and vomiting. Musculoskeletal: Negative for arthralgias, back pain, myalgias and neck pain. Skin: Positive for color change and wound. Negative for rash. Allergic/Immunologic: Positive for environmental allergies. Negative for food allergies. Neurological: Negative for dizziness, light-headedness and headaches.          Current Medications       Current Outpatient Medications:   •  busPIRone (BUSPAR) 10 mg tablet, TAKE 1 TABLET BY MOUTH THREE TIMES A DAY, Disp: 270 tablet, Rfl: 1  •  cephalexin (KEFLEX) 500 mg capsule, Take 1 capsule (500 mg total) by mouth every 6 (six) hours for 7 days, Disp: 28 capsule, Rfl: 0  •  Elastic Bandages & Supports (Thumb Splint/Right Medium) MISC, Use in the morning Please dispense right thumb spica, Disp: 1 each, Rfl: 0  •  fluocinolone (SYNALAR) 0.025 % ointment, APPLY TO AFFECTED AREA ON THE EYELIDS TWICE A DAY X 7 DAYS MAX AS NEEDED, Disp: , Rfl:   •  fluticasone (FLONASE) 50 mcg/act nasal spray, 1 spray into each nostril daily, Disp: , Rfl:   •  Multiple Vitamins-Iron (DAILY MULTIPLE VITAMIN/IRON) TABS, Take by mouth, Disp: , Rfl:   •  Probiotic Product (PROBIOTIC-10 PO), Take by mouth, Disp: , Rfl:   •  sertraline (ZOLOFT) 50 mg tablet, Take 1 tablet (50 mg total) by mouth daily, Disp: 90 tablet, Rfl: 1  •  traZODone (DESYREL) 50 mg tablet, Take 1 tablet (50 mg total) by mouth daily at bedtime as needed for sleep, Disp: 90 tablet, Rfl: 1  •  levonorgestrel-ethinyl estradiol (Altavera) 0.15-30 MG-MCG per tablet, Take 1 tablet by mouth daily for 28 days, Disp: 84 tablet, Rfl: 4    Current Allergies     Allergies as of 2023 - Reviewed 2023   Allergen Reaction Noted   • Mold extract [trichophyton] Itching and Sneezing 09/10/2013   • Pollen extract Itching and Sneezing 09/10/2013            The following portions of the patient's history were reviewed and updated as appropriate: allergies, current medications, past family history, past medical history, past social history, past surgical history and problem list.     Past Medical History:   Diagnosis Date   • Inability to conceive, female        Past Surgical History:   Procedure Laterality Date   •  SECTION     • FOOT SURGERY     • TOOTH EXTRACTION         Family History   Problem Relation Age of Onset   • No Known Problems Mother    • Leukemia Father    • No Known Problems Sister    • No Known Problems Daughter    • Dementia Maternal Grandmother    • Parkinsonism Maternal Grandmother    • Stroke Maternal Grandmother    • Lymphoma Maternal Grandmother    • No Known Problems Maternal Aunt    • No Known Problems Paternal Aunt    • Esophageal cancer Family    • Breast cancer Neg Hx          Medications have been verified. Objective   /78   Pulse 79   Temp (!) 97 °F (36.1 °C)   Resp 18   Ht 5' 10" (1.778 m)   Wt 85.4 kg (188 lb 3.2 oz)   LMP 2023 (Exact Date)   SpO2 98%   BMI 27.00 kg/m²        Physical Exam     Physical Exam  Vitals and nursing note reviewed. Constitutional:       General: She is awake. Appearance: Normal appearance. She is well-developed and normal weight. HENT:      Head: Normocephalic and atraumatic.       Right Ear: Hearing normal.      Left Ear: Hearing normal.      Nose: No congestion or rhinorrhea. Mouth/Throat:      Lips: Pink. Mouth: Mucous membranes are moist.   Eyes:      Conjunctiva/sclera: Conjunctivae normal.   Cardiovascular:      Rate and Rhythm: Normal rate and regular rhythm. Pulses: Normal pulses. Dorsalis pedis pulses are 2+ on the left side. Posterior tibial pulses are 2+ on the left side. Heart sounds: Normal heart sounds. Pulmonary:      Effort: Pulmonary effort is normal.      Breath sounds: Normal breath sounds. Musculoskeletal:         General: Normal range of motion. Cervical back: Normal range of motion and neck supple. Right lower leg: No edema. Left lower leg: No edema. Feet:      Left foot:      Skin integrity: Skin integrity normal.      Toenail Condition: Left toenails are normal.   Skin:     General: Skin is warm and dry. Capillary Refill: Capillary refill takes less than 2 seconds. Findings: Erythema and wound present. No abrasion, abscess, bruising, ecchymosis or rash. Comments: Small circular wound (<0.5cm) above left ankle, with mild erythema and slough present in the center. No associated drainage. Neurological:      General: No focal deficit present. Mental Status: She is alert and oriented to person, place, and time. GCS: GCS eye subscore is 4. GCS verbal subscore is 5. GCS motor subscore is 6. Psychiatric:         Mood and Affect: Mood normal.         Behavior: Behavior normal. Behavior is cooperative. Thought Content:  Thought content normal.         Judgment: Judgment normal.

## 2023-10-04 ENCOUNTER — OFFICE VISIT (OUTPATIENT)
Dept: FAMILY MEDICINE CLINIC | Facility: CLINIC | Age: 48
End: 2023-10-04
Payer: COMMERCIAL

## 2023-10-04 VITALS
HEIGHT: 70 IN | BODY MASS INDEX: 27 KG/M2 | OXYGEN SATURATION: 98 % | DIASTOLIC BLOOD PRESSURE: 70 MMHG | RESPIRATION RATE: 12 BRPM | TEMPERATURE: 98.1 F | SYSTOLIC BLOOD PRESSURE: 124 MMHG | WEIGHT: 188.6 LBS

## 2023-10-04 DIAGNOSIS — Z13.6 SCREENING FOR CARDIOVASCULAR CONDITION: ICD-10-CM

## 2023-10-04 DIAGNOSIS — M25.522 BILATERAL ELBOW JOINT PAIN: Primary | ICD-10-CM

## 2023-10-04 DIAGNOSIS — M25.521 BILATERAL ELBOW JOINT PAIN: Primary | ICD-10-CM

## 2023-10-04 DIAGNOSIS — Z00.00 HEALTHCARE MAINTENANCE: ICD-10-CM

## 2023-10-04 DIAGNOSIS — Z13.1 SCREENING FOR DIABETES MELLITUS: ICD-10-CM

## 2023-10-04 PROCEDURE — 99214 OFFICE O/P EST MOD 30 MIN: CPT

## 2023-10-04 RX ORDER — MELOXICAM 7.5 MG/1
7.5 TABLET ORAL DAILY PRN
Qty: 30 TABLET | Refills: 3 | Status: SHIPPED | OUTPATIENT
Start: 2023-10-04

## 2023-10-04 NOTE — ASSESSMENT & PLAN NOTE
Is most likely due to lateral epiglottitis, will initiate meloxicam 7.5 mg daily. Rest, ice or heat. May use brace. We will also obtain imaging and blood work ordered to rule out any possible autoimmune disease.

## 2023-10-04 NOTE — PROGRESS NOTES
Assessment/Plan:         Problem List Items Addressed This Visit        Musculoskeletal and Integument    Bilateral elbow joint pain - Primary     Is most likely due to lateral epiglottitis, will initiate meloxicam 7.5 mg daily. Rest, ice or heat. May use brace. We will also obtain imaging and blood work ordered to rule out any possible autoimmune disease. Relevant Medications    meloxicam (MOBIC) 7.5 mg tablet    Other Relevant Orders    IBAN Screen w/ Reflex to Titer/Pattern    RF Screen w/ Reflex to Titer    Sjogren's Antibodies    XR elbow 3+ vw left    XR elbow 3+ vw right    Ambulatory Referral to Physical Therapy   Other Visit Diagnoses     Healthcare maintenance        Relevant Orders    CBC and differential    Comprehensive metabolic panel    Hemoglobin A1C    Lipid panel    TSH, 3rd generation with Free T4 reflex    IBAN Screen w/ Reflex to Titer/Pattern    RF Screen w/ Reflex to Titer    Sjogren's Antibodies    Screening for cardiovascular condition        Relevant Orders    Lipid panel    Screening for diabetes mellitus        Relevant Orders    Hemoglobin A1C            Subjective:      Patient ID: Vance Maguire is a 49 y.o. female. Patient presents to the office complaining of bilateral elbow pain that started this spring. She has tried Motrin with minimal relief. She denies any direct injury to both elbows. Does work at [employer] and does make some repetitive motions and leaning. Arm Pain   The incident occurred more than 1 week ago (Spring 2023). The injury mechanism is unknown. The pain is present in the left elbow and right elbow. The quality of the pain is described as aching. The pain does not radiate. The pain is mild. The pain has been fluctuating since the incident. Pertinent negatives include no chest pain, muscle weakness, numbness or tingling. The symptoms are aggravated by lifting and movement. She has tried NSAIDs for the symptoms. The treatment provided mild relief. The following portions of the patient's history were reviewed and updated as appropriate:   Past Medical History:  She has a past medical history of HL (hearing loss), Inability to conceive, female, and Tinnitus. ,  _______________________________________________________________________  Medical Problems:  does not have any pertinent problems on file.,  _______________________________________________________________________  Past Surgical History:   has a past surgical history that includes  section; Foot surgery; and Tooth extraction. ,  _______________________________________________________________________  Family History:  family history includes Dementia in her maternal grandmother; Esophageal cancer in her family; Leukemia in her father; Lymphoma in her maternal grandmother; No Known Problems in her daughter, maternal aunt, mother, paternal aunt, and sister; Parkinsonism in her maternal grandmother; Stroke in her maternal grandmother.,  _______________________________________________________________________  Social History:   reports that she has never smoked. She has never been exposed to tobacco smoke. She has never used smokeless tobacco. She reports current alcohol use. She reports that she does not use drugs. ,  _______________________________________________________________________  Allergies:  is allergic to mold extract [trichophyton] and pollen extract. .  _______________________________________________________________________  Current Outpatient Medications   Medication Sig Dispense Refill   • busPIRone (BUSPAR) 10 mg tablet TAKE 1 TABLET BY MOUTH THREE TIMES A  tablet 1   • fluticasone (FLONASE) 50 mcg/act nasal spray 1 spray into each nostril daily     • meloxicam (MOBIC) 7.5 mg tablet Take 1 tablet (7.5 mg total) by mouth daily as needed for moderate pain 30 tablet 3   • sertraline (ZOLOFT) 50 mg tablet Take 1 tablet (50 mg total) by mouth daily 90 tablet 1   • Elastic Bandages & Supports (Thumb Splint/Right Medium) MISC Use in the morning Please dispense right thumb spica 1 each 0   • fluocinolone (SYNALAR) 0.025 % ointment APPLY TO AFFECTED AREA ON THE EYELIDS TWICE A DAY X 7 DAYS MAX AS NEEDED     • levonorgestrel-ethinyl estradiol (Altavera) 0.15-30 MG-MCG per tablet Take 1 tablet by mouth daily for 28 days 84 tablet 4   • Multiple Vitamins-Iron (DAILY MULTIPLE VITAMIN/IRON) TABS Take by mouth     • Probiotic Product (PROBIOTIC-10 PO) Take by mouth     • traZODone (DESYREL) 50 mg tablet Take 1 tablet (50 mg total) by mouth daily at bedtime as needed for sleep 90 tablet 1     No current facility-administered medications for this visit.     _______________________________________________________________________  Review of Systems   Constitutional: Negative for chills, fatigue and fever. Respiratory: Negative for cough and shortness of breath. Cardiovascular: Negative for chest pain. Gastrointestinal: Negative for abdominal pain and vomiting. Genitourinary: Negative for dysuria. Musculoskeletal: Positive for arthralgias. Negative for back pain and joint swelling. Neurological: Negative for tingling, numbness and headaches. All other systems reviewed and are negative. Objective:  Vitals:    10/04/23 0759   BP: 124/70   Resp: 12   Temp: 98.1 °F (36.7 °C)   SpO2: 98%   Weight: 85.5 kg (188 lb 9.6 oz)   Height: 5' 10" (1.778 m)     Body mass index is 27.06 kg/m². Physical Exam  Vitals and nursing note reviewed. Constitutional:       General: She is not in acute distress. Appearance: Normal appearance. She is not ill-appearing. Cardiovascular:      Rate and Rhythm: Normal rate and regular rhythm. Heart sounds: Normal heart sounds. Pulmonary:      Effort: Pulmonary effort is normal.      Breath sounds: Normal breath sounds. Musculoskeletal:         General: Tenderness (Lateral epicondylitis) present. Normal range of motion.    Skin:     General: Skin is warm and dry. Neurological:      Mental Status: She is alert and oriented to person, place, and time. Cranial Nerves: No cranial nerve deficit. Sensory: No sensory deficit. Motor: No weakness. Psychiatric:         Mood and Affect: Mood normal.         Behavior: Behavior normal.         Thought Content: Thought content normal.         Judgment: Judgment normal.               Portions of the above record have been created with voice recognition software. Occasional wrong word or "sound alike" substitution may have occurred due to the inherent limitations of voice recognition software. Read the chart carefully and recognize, using context, where substitution may have occurred.

## 2023-10-04 NOTE — PATIENT INSTRUCTIONS
Tennis Elbow   AMBULATORY CARE:   Tennis elbow  is an injury of the tendons in your elbow. Tendons are strong tissues that connect muscle to bone. Common symptoms include the following:   Pain in your elbow that travels to your shoulder, forearm, or fingers    Weakness in your wrist or hand    Trouble holding, lifting, or grabbing an object, such as a coffee cup    Red, swollen, warm skin on the outside of your elbow    Seek care immediately if:   You suddenly have no feeling in your arm, hand, or fingers. You suddenly cannot move your arm, wrist, hand, or fingers. Call your doctor if:   Your symptoms do not get better within 2 weeks, even with treatment. You have more pain or weakness in your arm, wrist, hand, or fingers. You have new numbness or tingling in your arm, hand, or fingers. You have questions or concerns about your condition or care. Treatment:   Acetaminophen  decreases pain and fever. It is available without a doctor's order. Ask how much to take and how often to take it. Follow directions. Read the labels of all other medicines you are using to see if they also contain acetaminophen, or ask your doctor or pharmacist. Acetaminophen can cause liver damage if not taken correctly. NSAIDs , such as ibuprofen, help decrease swelling, pain, and fever. This medicine is available with or without a doctor's order. NSAIDs can cause stomach bleeding or kidney problems in certain people. If you take blood thinner medicine, always ask your healthcare provider if NSAIDs are safe for you. Always read the medicine label and follow directions. A steroid injection  will help decrease pain and swelling. Physical therapy  may be recommended. A physical therapist teaches you exercises to help improve movement and strength, and to decrease pain. Surgery  may be needed if your symptoms do not improve with other treatments. During surgery, your provider will remove any damaged tissue.  He or she may also cut your tendon and reattach it. Self-care:   Wear your support device  as directed. Devices, such as an arm strap, brace, or splint, help limit your arm movement. They also decrease pain and help prevent more damage to your tendon. Ask your provider how to care for your arm while you wear a brace or splint. Rest  your injured arm and avoid activities that cause pain. This will help your tendons heal.    Apply ice  on your elbow for 15 to 20 minutes every hour or as directed. Use an ice pack, or put crushed ice in a plastic bag. Cover it with a towel before you apply it to your skin. Ice helps prevent tissue damage and decreases swelling and pain. Elevate  your elbow above the level of your heart as often as you can. This will help decrease swelling and pain. Prop your elbow on pillows or blankets to keep it elevated comfortably. Follow up with your doctor as directed:  Write down your questions so you remember to ask them during your visits. © Copyright [employer] [address] 2023 Information is for End User's use only and may not be sold, redistributed or otherwise used for commercial purposes. The above information is an  only. It is not intended as medical advice for individual conditions or treatments. Talk to your doctor, nurse or pharmacist before following any medical regimen to see if it is safe and effective for you.

## 2023-10-09 ENCOUNTER — HOSPITAL ENCOUNTER (OUTPATIENT)
Dept: RADIOLOGY | Facility: HOSPITAL | Age: 48
Discharge: HOME | End: 2023-10-09
Payer: COMMERCIAL

## 2023-10-09 DIAGNOSIS — M25.522 BILATERAL ELBOW JOINT PAIN: ICD-10-CM

## 2023-10-09 DIAGNOSIS — M25.521 BILATERAL ELBOW JOINT PAIN: ICD-10-CM

## 2023-10-09 PROCEDURE — 73080 X-RAY EXAM OF ELBOW: CPT

## 2023-10-12 ENCOUNTER — APPOINTMENT (OUTPATIENT)
Dept: LAB | Facility: HOSPITAL | Age: 48
End: 2023-10-12
Payer: COMMERCIAL

## 2023-10-12 DIAGNOSIS — Z13.1 SCREENING FOR DIABETES MELLITUS: ICD-10-CM

## 2023-10-12 DIAGNOSIS — Z13.6 SCREENING FOR CARDIOVASCULAR CONDITION: ICD-10-CM

## 2023-10-12 DIAGNOSIS — M25.522 BILATERAL ELBOW JOINT PAIN: ICD-10-CM

## 2023-10-12 DIAGNOSIS — Z00.00 HEALTHCARE MAINTENANCE: ICD-10-CM

## 2023-10-12 DIAGNOSIS — M25.521 BILATERAL ELBOW JOINT PAIN: ICD-10-CM

## 2023-10-12 LAB
ALBUMIN SERPL BCP-MCNC: 4.1 G/DL (ref 3.5–5)
ALP SERPL-CCNC: 59 U/L (ref 34–104)
ALT SERPL W P-5'-P-CCNC: 10 U/L (ref 7–52)
ANA SER QL IA: NEGATIVE
ANION GAP SERPL CALCULATED.3IONS-SCNC: 6 MMOL/L
AST SERPL W P-5'-P-CCNC: 14 U/L (ref 13–39)
BASOPHILS # BLD AUTO: 0.02 THOUSANDS/ÂΜL (ref 0–0.1)
BASOPHILS NFR BLD AUTO: 0 % (ref 0–1)
BILIRUB SERPL-MCNC: 0.54 MG/DL (ref 0.2–1)
BUN SERPL-MCNC: 12 MG/DL (ref 5–25)
CALCIUM SERPL-MCNC: 9.2 MG/DL (ref 8.4–10.2)
CHLORIDE SERPL-SCNC: 106 MMOL/L (ref 96–108)
CHOLEST SERPL-MCNC: 190 MG/DL
CO2 SERPL-SCNC: 27 MMOL/L (ref 21–32)
CREAT SERPL-MCNC: 0.78 MG/DL (ref 0.6–1.3)
EOSINOPHIL # BLD AUTO: 0.06 THOUSAND/ÂΜL (ref 0–0.61)
EOSINOPHIL NFR BLD AUTO: 1 % (ref 0–6)
ERYTHROCYTE [DISTWIDTH] IN BLOOD BY AUTOMATED COUNT: 13 % (ref 11.6–15.1)
EST. AVERAGE GLUCOSE BLD GHB EST-MCNC: 103 MG/DL
GFR SERPL CREATININE-BSD FRML MDRD: 90 ML/MIN/1.73SQ M
GLUCOSE P FAST SERPL-MCNC: 91 MG/DL (ref 65–99)
HBA1C MFR BLD: 5.2 %
HCT VFR BLD AUTO: 41.4 % (ref 34.8–46.1)
HDLC SERPL-MCNC: 61 MG/DL
HGB BLD-MCNC: 13.8 G/DL (ref 11.5–15.4)
IMM GRANULOCYTES # BLD AUTO: 0.01 THOUSAND/UL (ref 0–0.2)
IMM GRANULOCYTES NFR BLD AUTO: 0 % (ref 0–2)
LDLC SERPL CALC-MCNC: 108 MG/DL (ref 0–100)
LYMPHOCYTES # BLD AUTO: 2.23 THOUSANDS/ÂΜL (ref 0.6–4.47)
LYMPHOCYTES NFR BLD AUTO: 39 % (ref 14–44)
MCH RBC QN AUTO: 32.2 PG (ref 26.8–34.3)
MCHC RBC AUTO-ENTMCNC: 33.3 G/DL (ref 31.4–37.4)
MCV RBC AUTO: 97 FL (ref 82–98)
MONOCYTES # BLD AUTO: 0.4 THOUSAND/ÂΜL (ref 0.17–1.22)
MONOCYTES NFR BLD AUTO: 7 % (ref 4–12)
NEUTROPHILS # BLD AUTO: 3.06 THOUSANDS/ÂΜL (ref 1.85–7.62)
NEUTS SEG NFR BLD AUTO: 53 % (ref 43–75)
NONHDLC SERPL-MCNC: 129 MG/DL
NRBC BLD AUTO-RTO: 0 /100 WBCS
PLATELET # BLD AUTO: 311 THOUSANDS/UL (ref 149–390)
PMV BLD AUTO: 11.1 FL (ref 8.9–12.7)
POTASSIUM SERPL-SCNC: 4.1 MMOL/L (ref 3.5–5.3)
PROT SERPL-MCNC: 7.1 G/DL (ref 6.4–8.4)
RBC # BLD AUTO: 4.29 MILLION/UL (ref 3.81–5.12)
RHEUMATOID FACT SER QL LA: NEGATIVE
SODIUM SERPL-SCNC: 139 MMOL/L (ref 135–147)
TRIGL SERPL-MCNC: 105 MG/DL
TSH SERPL DL<=0.05 MIU/L-ACNC: 3.17 UIU/ML (ref 0.45–4.5)
WBC # BLD AUTO: 5.78 THOUSAND/UL (ref 4.31–10.16)

## 2023-10-12 PROCEDURE — 80061 LIPID PANEL: CPT

## 2023-10-12 PROCEDURE — 80053 COMPREHEN METABOLIC PANEL: CPT

## 2023-10-12 PROCEDURE — 86235 NUCLEAR ANTIGEN ANTIBODY: CPT

## 2023-10-12 PROCEDURE — 84443 ASSAY THYROID STIM HORMONE: CPT

## 2023-10-12 PROCEDURE — 36415 COLL VENOUS BLD VENIPUNCTURE: CPT

## 2023-10-12 PROCEDURE — 86430 RHEUMATOID FACTOR TEST QUAL: CPT

## 2023-10-12 PROCEDURE — 83036 HEMOGLOBIN GLYCOSYLATED A1C: CPT

## 2023-10-12 PROCEDURE — 86038 ANTINUCLEAR ANTIBODIES: CPT

## 2023-10-12 PROCEDURE — 85025 COMPLETE CBC W/AUTO DIFF WBC: CPT

## 2023-10-13 LAB
ENA SS-A AB SER-ACNC: <0.2 AI (ref 0–0.9)
ENA SS-B AB SER-ACNC: <0.2 AI (ref 0–0.9)

## 2023-10-30 ENCOUNTER — APPOINTMENT (OUTPATIENT)
Dept: LAB | Facility: HOSPITAL | Age: 48
End: 2023-10-30
Payer: COMMERCIAL

## 2023-10-30 ENCOUNTER — OFFICE VISIT (OUTPATIENT)
Dept: FAMILY MEDICINE CLINIC | Facility: CLINIC | Age: 48
End: 2023-10-30
Payer: COMMERCIAL

## 2023-10-30 VITALS
SYSTOLIC BLOOD PRESSURE: 122 MMHG | HEART RATE: 74 BPM | TEMPERATURE: 98.4 F | OXYGEN SATURATION: 98 % | WEIGHT: 194.2 LBS | HEIGHT: 70 IN | DIASTOLIC BLOOD PRESSURE: 80 MMHG | BODY MASS INDEX: 27.8 KG/M2

## 2023-10-30 DIAGNOSIS — M25.521 BILATERAL ELBOW JOINT PAIN: ICD-10-CM

## 2023-10-30 DIAGNOSIS — E55.9 VITAMIN D DEFICIENCY: ICD-10-CM

## 2023-10-30 DIAGNOSIS — M25.522 BILATERAL ELBOW JOINT PAIN: ICD-10-CM

## 2023-10-30 DIAGNOSIS — M25.521 BILATERAL ELBOW JOINT PAIN: Primary | ICD-10-CM

## 2023-10-30 DIAGNOSIS — M25.522 BILATERAL ELBOW JOINT PAIN: Primary | ICD-10-CM

## 2023-10-30 LAB
25(OH)D3 SERPL-MCNC: 46.8 NG/ML (ref 30–100)
B BURGDOR IGG+IGM SER QL IA: NEGATIVE
CRP SERPL QL: 2.8 MG/L
ERYTHROCYTE [SEDIMENTATION RATE] IN BLOOD: 3 MM/HOUR (ref 0–19)

## 2023-10-30 PROCEDURE — 86200 CCP ANTIBODY: CPT

## 2023-10-30 PROCEDURE — 86618 LYME DISEASE ANTIBODY: CPT

## 2023-10-30 PROCEDURE — 36415 COLL VENOUS BLD VENIPUNCTURE: CPT

## 2023-10-30 PROCEDURE — 82306 VITAMIN D 25 HYDROXY: CPT

## 2023-10-30 PROCEDURE — 85652 RBC SED RATE AUTOMATED: CPT

## 2023-10-30 PROCEDURE — 99214 OFFICE O/P EST MOD 30 MIN: CPT

## 2023-10-30 PROCEDURE — 86140 C-REACTIVE PROTEIN: CPT

## 2023-10-30 NOTE — PROGRESS NOTES
Assessment/Plan:         Problem List Items Addressed This Visit        Musculoskeletal and Integument    Bilateral elbow joint pain - Primary     Pain is not improved, present for several months. Suggest elbow braces bilaterally, reviewed xray. Suggest glucosamine chondroitin, tumeric and cherry xtract. Please schedule with ortho. Have lab work, will call with results. Relevant Orders    Lyme Total AB W Reflex to IGM/IGG    MRI arthrogram left elbow    MRI arthrogram right elbow    Ambulatory Referral to Orthopedic Surgery    Cyclic citrul peptide antibody, IgG    Sedimentation rate, automated    C-reactive protein   Other Visit Diagnoses     Vitamin D deficiency        Have lab work, will call with results. Relevant Orders    Vitamin D 25 hydroxy            Subjective:      Patient ID: Holli Thapa is a 49 y.o. female. Continued elbow pain, negative xray. 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 The following portions of the patient's history were reviewed and updated as appropriate:   Past Medical History:  She has a past medical history of HL (hearing loss), Inability to conceive, female, and Tinnitus. ,  _______________________________________________________________________  Medical Problems:  does not have any pertinent problems on file.,  _______________________________________________________________________  Past Surgical History:   has a past surgical history that includes  section; Foot surgery; and Tooth extraction. ,  _______________________________________________________________________  Family History:  family history includes Dementia in her maternal grandmother; Esophageal cancer in her family; Leukemia in her father; Lymphoma in her maternal grandmother; No Known Problems in her daughter, maternal aunt, mother, paternal aunt, and sister; Parkinsonism in her maternal grandmother; Stroke in her maternal grandmother.,  _______________________________________________________________________  Social History:   reports that she has never smoked. She has never been exposed to tobacco smoke. She has never used smokeless tobacco. She reports current alcohol use. She reports that she does not use drugs. ,  _______________________________________________________________________  Allergies:  is allergic to mold extract [trichophyton] and pollen extract. .  _______________________________________________________________________  Current Outpatient Medications   Medication Sig Dispense Refill   • busPIRone (BUSPAR) 10 mg tablet TAKE 1 TABLET BY MOUTH THREE TIMES A  tablet 1   • Elastic Bandages & Supports (Thumb Splint/Right Medium) MISC Use in the morning Please dispense right thumb spica 1 each 0   • fluocinolone (SYNALAR) 0.025 % ointment APPLY TO AFFECTED AREA ON THE EYELIDS TWICE A DAY X 7 DAYS MAX AS NEEDED     • fluticasone (FLONASE) 50 mcg/act nasal spray 1 spray into each nostril daily     • levonorgestrel-ethinyl estradiol (Altavera) 0.15-30 MG-MCG per tablet Take 1 tablet by mouth daily for 28 days 84 tablet 4   • meloxicam (MOBIC) 7.5 mg tablet Take 1 tablet (7.5 mg total) by mouth daily as needed for moderate pain 30 tablet 3   • Multiple Vitamins-Iron (DAILY MULTIPLE VITAMIN/IRON) TABS Take by mouth     • Probiotic Product (PROBIOTIC-10 PO) Take by mouth     • sertraline (ZOLOFT) 50 mg tablet Take 1 tablet (50 mg total) by mouth daily 90 tablet 1   • traZODone (DESYREL) 50 mg tablet Take 1 tablet (50 mg total) by mouth daily at bedtime as needed for sleep 90 tablet 1     No current facility-administered medications for this visit.     _______________________________________________________________________  Review of Systems   Constitutional:  Positive for diaphoresis. Negative for chills, fatigue and fever. HENT:  Negative for congestion, ear pain, rhinorrhea, sinus pressure, sinus pain and sore throat. Eyes:  Negative for pain and visual disturbance. Respiratory:  Negative for cough, chest tightness and shortness of breath. Cardiovascular:  Negative for chest pain and palpitations. Gastrointestinal:  Positive for constipation and diarrhea. Negative for abdominal pain, nausea and vomiting. Genitourinary:  Negative for dysuria, frequency and hematuria. Musculoskeletal:  Negative for arthralgias, back pain and myalgias. Skin:  Negative for color change and rash. Neurological:  Negative for dizziness, seizures, syncope, light-headedness and headaches. Psychiatric/Behavioral:  Negative for dysphoric mood and sleep disturbance. The patient is not nervous/anxious. All other systems reviewed and are negative. Objective:  Vitals:    10/30/23 1204   BP: 122/80   BP Location: Right arm   Patient Position: Sitting   Cuff Size: Standard   Pulse: 74   Temp: 98.4 °F (36.9 °C)   SpO2: 98%   Weight: 88.1 kg (194 lb 3.2 oz)   Height: 5' 10" (1.778 m)     Body mass index is 27.86 kg/m². Physical Exam  Vitals and nursing note reviewed. Constitutional:       Appearance: Normal appearance. She is not ill-appearing. HENT:      Head: Normocephalic. Right Ear: Tympanic membrane, ear canal and external ear normal. There is no impacted cerumen. Left Ear: Tympanic membrane, ear canal and external ear normal. There is no impacted cerumen. Nose: Nose normal. No congestion. Mouth/Throat:      Mouth: Mucous membranes are moist.      Pharynx: No posterior oropharyngeal erythema. Eyes:      Extraocular Movements: Extraocular movements intact. Conjunctiva/sclera: Conjunctivae normal.      Pupils: Pupils are equal, round, and reactive to light. Cardiovascular:      Rate and Rhythm: Normal rate and regular rhythm. Heart sounds: Normal heart sounds. No murmur heard. Pulmonary:      Effort: Pulmonary effort is normal.      Breath sounds: Normal breath sounds. No wheezing. Abdominal:      Palpations: Abdomen is soft. Tenderness: There is no abdominal tenderness. Musculoskeletal:         General: Normal range of motion. Cervical back: Normal range of motion. Right lower leg: No edema. Left lower leg: No edema. Skin:     General: Skin is warm and dry. Neurological:      General: No focal deficit present. Mental Status: She is alert.    Psychiatric:         Mood and Affect: Mood normal.         Behavior: Behavior normal.

## 2023-10-30 NOTE — ASSESSMENT & PLAN NOTE
Pain is not improved, present for several months. Suggest elbow braces bilaterally, reviewed xray. Suggest glucosamine chondroitin, tumeric and cherry xtract. Please schedule with ortho. Have lab work, will call with results.

## 2023-10-31 LAB — CCP AB SER IA-ACNC: 1.3

## 2023-11-07 ENCOUNTER — OFFICE VISIT (OUTPATIENT)
Dept: OBGYN CLINIC | Facility: MEDICAL CENTER | Age: 48
End: 2023-11-07
Payer: COMMERCIAL

## 2023-11-07 VITALS
SYSTOLIC BLOOD PRESSURE: 113 MMHG | WEIGHT: 195 LBS | BODY MASS INDEX: 27.92 KG/M2 | DIASTOLIC BLOOD PRESSURE: 77 MMHG | HEART RATE: 63 BPM | HEIGHT: 70 IN

## 2023-11-07 DIAGNOSIS — M25.522 BILATERAL ELBOW JOINT PAIN: ICD-10-CM

## 2023-11-07 DIAGNOSIS — M25.521 BILATERAL ELBOW JOINT PAIN: ICD-10-CM

## 2023-11-07 DIAGNOSIS — S56.919A: Primary | ICD-10-CM

## 2023-11-07 PROCEDURE — 99204 OFFICE O/P NEW MOD 45 MIN: CPT | Performed by: ORTHOPAEDIC SURGERY

## 2023-11-07 NOTE — PROGRESS NOTES
The HAND & UPPER EXTREMITY OFFICE VISIT   Referred By:  [place]  [address],  [address]    Chief Complaint:     Bilateral elbow pain    History of Present Illness:   49 y.o., right hand dominant female presents for initial evaluation of bilateral elbow pain. Patient reports symptoms started several months ago after several weeks of repetitive lifting and moving boxes at work as they are cleaning out 1 of spaces. She initially had wrist pain in the bilateral wrist as well but this improved with bracing and over time. The elbow pain has persisted. She notes the final straw was a few weeks ago, she tried to hand a cereal bowl to her  and the right elbow gave out on her. Describes pain as "deep in the back of the elbow". Some pain with range of motion. She has orders for bilateral elbow MR Arthrograms which have not been completed. She also was ordered PT, which she hasn't started yet.        ADLs: Community ambulator    Smoke: denies   ETOH: denies   Drugs:  denies  Job:      Past Medical History:  Past Medical History:   Diagnosis Date    HL (hearing loss)     Inability to conceive, female     Tinnitus      Past Surgical History:   Procedure Laterality Date     SECTION      FOOT SURGERY      TOOTH EXTRACTION       Family History   Problem Relation Age of Onset    No Known Problems Mother     Leukemia Father     No Known Problems Sister     No Known Problems Daughter     Dementia Maternal Grandmother     Parkinsonism Maternal Grandmother     Stroke Maternal Grandmother     Lymphoma Maternal Grandmother     No Known Problems Maternal Aunt     No Known Problems Paternal Aunt     Esophageal cancer Family     Breast cancer Neg Hx      Social History     Socioeconomic History    Marital status: /Civil Union     Spouse name: Not on file    Number of children: Not on file    Years of education: Not on file    Highest education level: Not on file Occupational History    Not on file   Tobacco Use    Smoking status: Never     Passive exposure: Never    Smokeless tobacco: Never   Vaping Use    Vaping Use: Never used   Substance and Sexual Activity    Alcohol use: Yes     Comment: social    Drug use: No    Sexual activity: Yes     Birth control/protection: Male Sterilization, Pill   Other Topics Concern    Not on file   Social History Narrative    1 cup tea/day- switched recently to decaf    Exercise - walking    Exercises moderately less than 3 times/wk     Social Determinants of Health     Financial Resource Strain: Not on file   Food Insecurity: Not on file   Transportation Needs: Not on file   Physical Activity: Not on file   Stress: Not on file   Social Connections: Not on file   Intimate Partner Violence: Not on file   Housing Stability: Not on file     Scheduled Meds:  Continuous Infusions:No current facility-administered medications for this visit. PRN Meds:. Allergies   Allergen Reactions    Mold Extract [Trichophyton] Itching and Sneezing    Pollen Extract Itching and Sneezing     Physical Examination:    /77   Pulse 63   Ht 5' 10" (1.778 m)   Wt 88.5 kg (195 lb)   BMI 27.98 kg/m²     Gen: A&Ox3, NAD  Cardiac: regular rate  Chest: non labored breathing  Abdomen: Non-distended    Right Upper Extremity:  Skin CDI  No obvious deformity of the shoulder, arm, elbow, forearm, wrist, hand  Sensation intact to light touch in the axillary median, ulnar, and radial nerve distributions  2+RP  Right:  Full elbow flexion/extension/pronation/supination with Pain at terminal flexion and extension  Tender at the radiocapitellar joint, mildly tender at the radial tunnel.   Non tender at triceps, medial epicondyle, lateral epicondyle  Minimal discomfort radial tunnel  5/5 strength elbow/wrist flexion/extension  Resisted wrist extension mildly painful  Elbow stable to varus/valgus stress  Negative Tinel's over the elbow    Left Upper Extremity:  Skin CDI  No obvious deformity of the shoulder, arm, elbow, forearm, wrist, hand  Sensation intact to light touch in the axillary median, ulnar, and radial nerve distributions  2+RP  Left elbow:  TTP proximal forearm extensor musculature and radial tunnel  Nontender lateral condyle, triceps, medial epicondyle  Full elbow range of motion in flexion/extension/pronation/supination, pain with terminal flexion and extension  5/5 strength elbow/wrist flexion/extension  Elbow stable to varus/valgus stress  Negative Tinel's at the elbow    Studies:  Radiographs: I personally reviewed and independently interpreted the available radiographs. 10/9/23: XR right elbow, multiple views, demonstrate no fractures or dislocations. No significant arthritic changes at the radiocapitellar, proximal radial ulnar or ulnar trochlear joint although there is slight spurring on the radial head near the proximal radial ulnar joint. She has area of radiolucency in the proximal radial shaft near the tuberosity which is similar to the contralateral side. No distinct lytic lesion. There is some cortical thickening of the radius and ulnar shafts which may be consistent with prior fracture. 10/9/23: XR left elbow, multiple views, demonstrate no fractures or dislocations. No severe arthritic changes. Congruent radiocapitellar, proximal radial ulnar and ulnar trochlear joints. She has area of radiolucency in the proximal radial shaft near the tuberosity which is similar to the contralateral side. No distinct lytic lesion. Sof tissues unremarkable. Labs:  Vitamin D, ESR, hemoglobin A1c, TSH, IBAN, anti-- CCP, CRP, rheumatoid factor, SSA and SSB antibodies, Lyme antibody were reviewed and all normal      Assessment and Plan:  1. Muscle strain of forearm, initial encounter        2. Bilateral elbow joint pain  Ambulatory Referral to Orthopedic Surgery        49 y.o. female presents with signs and symptoms consistent with the above diagnosis. We discussed the natural history of this condition and its pathogenesis. We discussed operative and nonoperative treatment options. Imaging reviewed and physical exam was performed. She does have a significant arthritic changes aside from mild spurring at the right radial head. She is a bit tender there but that does not seem to be the main pain she describes she has no pain with pronation or supination full range of motion. Her pain appears to be more muscular in nature and could be related to lateral epicondylitis given that her extensor musculature is involved although she does not have distinct pain at the lateral condyle. We discussed that overall I do not see a discrete injury or anything that requires urgent operative intervention. I would recommend physical therapy to work on stretching and gentle strengthening of the muscles around the elbow. As far as the MRI she would like to keep her appointment as it is difficult to schedule. Patient is scheduled for bilateral elbow MRI on 12/6/23 and 12/11/23. We will see if these tests elucidate any further findings such as a plica or other concern for soft tissue impingement of the posterior lateral elbow. In the meantime, She will begin PT now and work on HEP for stretching and strengthening. Patient will return to the office following MRIs to review results and discuss treatment options. she expressed understanding of the plan and agreed. We encouraged them to contact our office with any questions or concerns. Feli Kline MD  Hand and Upper Extremity Surgery    *This note was dictated using Dragon voice recognition software. Please excuse any word substitutions or errors. *

## 2023-11-29 NOTE — NURSING NOTE
Call placed to patient to discuss upcoming appointment at [address] [place] [address] radiology department and complete consultation with patient. Patient is having a right elbow MRI arthrogram utilizing fluoroscopy  guidance. Reviewed patient's allergies,  no current anticoagulant medication present, also discussed the pre and post procedure expectations. Patient is having the left elbow done by the 6th at another campus. Reminded patient of location and time expected for this procedure, Patient expressed understanding by verbalizing and repeating instructions.

## 2023-12-06 ENCOUNTER — HOSPITAL ENCOUNTER (OUTPATIENT)
Dept: RADIOLOGY | Facility: HOSPITAL | Age: 48
Discharge: HOME | End: 2023-12-06

## 2023-12-07 ENCOUNTER — EVALUATION (OUTPATIENT)
Dept: PHYSICAL THERAPY | Facility: CLINIC | Age: 48
End: 2023-12-07
Payer: COMMERCIAL

## 2023-12-07 DIAGNOSIS — M25.521 BILATERAL ELBOW JOINT PAIN: ICD-10-CM

## 2023-12-07 DIAGNOSIS — M25.522 BILATERAL ELBOW JOINT PAIN: ICD-10-CM

## 2023-12-07 PROCEDURE — 97161 PT EVAL LOW COMPLEX 20 MIN: CPT

## 2023-12-07 PROCEDURE — 97110 THERAPEUTIC EXERCISES: CPT

## 2023-12-07 NOTE — LETTER
2023    KONRAD Curtis    Patient: Junito Corbin   YOB: 1975   Date of Visit: 2023     Encounter Diagnosis     ICD-10-CM    1. Bilateral elbow joint pain  M25.521 Ambulatory Referral to Physical Therapy    M25.522           Dear Dr. Rivka Ayoub: Thank you for your recent referral of Junito Corbin. Please review the attached evaluation summary from Ana's recent visit. Please verify that you agree with the plan of care by signing the attached order. If you have any questions or concerns, please do not hesitate to call. I sincerely appreciate the opportunity to share in the care of one of your patients and hope to have another opportunity to work with you in the near future. Sincerely,    Abilio Gomes, PT, DPT      Referring Provider:      I certify that I have read the below Plan of Care and certify the need for these services furnished under this plan of treatment while under my care. KONRAD Curtis  [facility] [place]          PT Evaluation     Today's date: 2023  Patient name: Junito Corbin  : 1975  MRN: [id number]  Referring provider: KONRAD Curtis  Dx:   Encounter Diagnosis     ICD-10-CM    1. Bilateral elbow joint pain  M25.521 Ambulatory Referral to Physical Therapy    M25.522           Start Time: 0932  Stop Time: 9644  Total time in clinic (min): 39 minutes    Assessment  Assessment details: Pt is a 51 y.o. female presenting to PT services with c/o b/l elbow pain. Pt has signs and symptoms suggestive of lateral epicondylitis evidenced by positive Mill's test and handshake test, as well as TTP along common wrist extensor tendons. Pt has limited R elbow extension and has limited supination and pronation AROM in comparison to L elbow. Pt has strength WNL.  PT added cross friction massage to common extensor tendon, wrist extensor stretch, and wrist flexion with dumbbell to pt's HEP, pt verbalized and demonstrated understanding of proper form. Pt is a good candidate for skilled physical therapy in order to improve elbow mobility, decrease pain with function, and increase functional tolerance. Impairments: abnormal or restricted ROM, activity intolerance, lacks appropriate home exercise program and pain with function    Goals  STG (3 weeks):  1. Pt will improve R elbow extension AROM to be 0*  2. Pt will improve R forearm supination to be symmetrical to L forearm  3. Pt will report pain at worst as 3/10 or less     LTG (6 weeks):  1. Pt will be independent in HEP  2. Pt will improve R forearm pronation AROM to be symmetrical to L forearm  3. Pt will have no TTP  4. Pt will be report pain at worst as 2/10 or less     Plan  Patient would benefit from: skilled physical therapy  Planned modality interventions: biofeedback, TENS, thermotherapy: hydrocollator packs, cryotherapy and unattended electrical stimulation  Planned therapy interventions: IASTM, joint mobilization, kinesiology taping, manual therapy, massage, nerve gliding, neuromuscular re-education, patient education, postural training, strengthening, stretching, therapeutic activities, therapeutic exercise, home exercise program, flexibility and functional ROM exercises  Frequency: 1x week  Duration in weeks: 6  Plan of Care beginning date: 12/7/2023  Plan of Care expiration date: 1/19/2024  Treatment plan discussed with: patient        Subjective Evaluation    History of Present Illness  Mechanism of injury: 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 She states there was a lot of packing and repetition and her wrists were very painful as well as her  elbows. The doctor recommended a spica for her wrists and that helped a lot. Then about 2 months ago her elbows started aching and it's equal on both sides. She states that some days are worse than others.    Patient Goals  Patient goals for therapy: decreased pain, increased strength and increased motion  Patient goal: "to go back to how I was before"  Pain  Current pain ratin  At best pain ratin  At worst pain ratin  Location: deep in lateral b/l elbows near ulnar groove  Quality: dull ache (constant, annoying)  Alleviating factors: let it pass, Advil. Exacerbated by: staying still for too long, walking or running, elbow flexion, pronation. 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 [employer])  Hand dominance: right      Diagnostic Tests  X-ray: normal        Objective     Active Range of Motion     Left Elbow   Flexion: WFL  Extension: 0 degrees   Forearm supination: 130 degrees   Forearm pronation: 164 degrees with pain    Right Elbow   Flexion: WFL  Extension: 16 degrees   Forearm supination: 119 degrees with pain  Forearm pronation: 156 degrees with pain    Strength/Myotome Testing     Left Elbow   Flexion: 5  Extension: 5  Forearm supination: 5  Forearm pronation: 5    Right Elbow   Flexion: 5  Extension: 5  Forearm supination: 5  Forearm pronation: 5    Left Wrist/Hand      (2nd hand position)     Trial 1: 60.9    Trial 2: 58.8    Trial 3: 54.5    Average: 58.07    Right Wrist/Hand      (2nd hand position)     Trial 1: 67.5    Trial 2: 65.9    Trial 3: 69.8    Average: 67.73    Tests     Left Elbow   Positive handshake and Mill's. Right Elbow   Positive handshake and Mill's. Precautions: None   Access Code: [facility]    POC expires Unit limit Auth Expiration date PT/OT/ST + Visit Limit?    24 3 23 BOMN                           Visit/Unit Tracking  AUTH Status:  Date               Required - pending Used 1               Remaining Date 12/7            Re-Eval             FOTO             Manuals             CFM  Self 1-2'            IASTM             Kinesiotape                           Neuro Re-Ed                                                                                                        Ther Ex             UBE             Wrist flexion 1# 2x10            Wrist extensor stretch 30"x3 ea            Resisted pronation/supination c bar                                                                 Ther Activity                                       Gait Training                                       Modalities

## 2023-12-11 ENCOUNTER — HOSPITAL ENCOUNTER (OUTPATIENT)
Dept: RADIOLOGY | Facility: HOSPITAL | Age: 48
Discharge: HOME | End: 2023-12-11

## 2023-12-13 ENCOUNTER — OFFICE VISIT (OUTPATIENT)
Dept: PHYSICAL THERAPY | Facility: CLINIC | Age: 48
End: 2023-12-13
Payer: COMMERCIAL

## 2023-12-13 DIAGNOSIS — M25.521 BILATERAL ELBOW JOINT PAIN: Primary | ICD-10-CM

## 2023-12-13 DIAGNOSIS — M25.522 BILATERAL ELBOW JOINT PAIN: Primary | ICD-10-CM

## 2023-12-13 PROCEDURE — 97140 MANUAL THERAPY 1/> REGIONS: CPT

## 2023-12-13 PROCEDURE — 97110 THERAPEUTIC EXERCISES: CPT

## 2023-12-13 NOTE — PROGRESS NOTES
Daily Note     Today's date: 2023  Patient name: Laura Goldberg  : 1975  MRN: [id number]  Referring provider: KONRAD Patel  Dx:   Encounter Diagnosis     ICD-10-CM    1. Bilateral elbow joint pain  M25.521     M25.522           Start Time: 0845  Stop Time: 0930  Total time in clinic (min): 45 minutes    Subjective: Pt reports that she is okay, she has been doing her HEP but she still has pain. Objective: See treatment diary below      Assessment: Pt tolerates initiation of POC well without increase in pain. PT trialed MFDc, will monitor prolonged response. Will update HEP NV. May incorporate scapular strengthening NV. Plan: Continue per plan of care. Progress treatment as tolerated. Precautions: None   Access Code: [facility]    POC expires Unit limit Auth Expiration date PT/OT/ST + Visit Limit?    24 3 23 BOMN                           Visit/Unit Tracking  AUTH Status:  Date              Required - pending Used 1 2              Remaining                        Date            Re-Eval             FOTO             Manuals             CFM  Self 1-2'            IASTM  SC b/l forearms           Kinesiotape              MFDc  B/l forearms            Neuro Re-Ed                                                                                                        Ther Ex             UBE  Pulleys 6'           Wrist flexion 1# 2x10            Wrist extensor stretch 30"x3 ea 1' ea           Resisted pronation/supination c bar  Yellow 2x10 ea           TheraBar flex/ext  Yellow 2x10 ea           Supination/pronation hammer  1 weight 2x10 ea                                     Ther Activity                                       Gait Training                                       Modalities

## 2023-12-20 ENCOUNTER — OFFICE VISIT (OUTPATIENT)
Dept: PHYSICAL THERAPY | Facility: CLINIC | Age: 48
End: 2023-12-20
Payer: COMMERCIAL

## 2023-12-20 DIAGNOSIS — M25.522 BILATERAL ELBOW JOINT PAIN: Primary | ICD-10-CM

## 2023-12-20 DIAGNOSIS — M25.521 BILATERAL ELBOW JOINT PAIN: Primary | ICD-10-CM

## 2023-12-20 PROCEDURE — 97140 MANUAL THERAPY 1/> REGIONS: CPT

## 2023-12-20 PROCEDURE — 97110 THERAPEUTIC EXERCISES: CPT

## 2023-12-20 PROCEDURE — 97112 NEUROMUSCULAR REEDUCATION: CPT

## 2023-12-20 NOTE — PROGRESS NOTES
"Daily Note     Today's date: 2023  Patient name: Ana Morse  : 1975  MRN: [id number]  Referring provider: Muna Gorman CRNP  Dx:   Encounter Diagnosis     ICD-10-CM    1. Bilateral elbow joint pain  M25.521     M25.522           Start Time: 0845  Stop Time: 0932  Total time in clinic (min): 47 minutes    Subjective: Pt reports that after last session she was sore in elbows for about 8 hours. She states that she started to use a heat pad on her forearms after doing her exercises and that has been feeling good.       Objective: See treatment diary below      Assessment: PT incorporated periscapular strengthening, will continue to progress as able. PT trialed kinesiotape to R wrist extensors, will monitor prolonged response. PT updated pt's HEP, pt verbalized and demonstrated understanding of proper form. Pt will continue to benefit from skilled physical therapy in order to improve R elbow mobility, decrease pain, and improve functional tolerance.     Plan: Continue per plan of care.  Progress treatment as tolerated.       Precautions: None   Access Code: [email or web address]    POC expires Unit limit Auth Expiration date PT/OT/ST + Visit Limit?   24 3 23 BOMN                           Visit/Unit Tracking  AUTH Status:  Date             Required - pending Used 1 2 3             Remaining                        Date           Re-Eval             FOTO             Manuals             CFM  Self 1-2'            IASTM  SC b/l forearms           Kinesiotape    SC R wrist extensors          MFDc  B/l forearms            Neuro Re-Ed             Rows   RTB 3x10          B/l  shoulder ext   RTB 3x10                                                                           Ther Ex             UBE  Pulleys 6' Pulleys 6'          Wrist flexion 1# 2x10  1# 2x10          Wrist extensor stretch 30\"x3 ea 1' ea 30\"x3 ea          Resisted pronation/supination c bar  Yellow 2x10 ea         "   TheraBar flex/ext  Yellow 2x10 ea           Supination/pronation hammer  1 weight 2x10 ea 1 weight 2x10 ea                                    Ther Activity                                       Gait Training                                       Modalities

## 2024-01-03 ENCOUNTER — OFFICE VISIT (OUTPATIENT)
Dept: PHYSICAL THERAPY | Facility: CLINIC | Age: 49
End: 2024-01-03
Payer: COMMERCIAL

## 2024-01-03 ENCOUNTER — TELEPHONE (OUTPATIENT)
Dept: FAMILY MEDICINE CLINIC | Facility: CLINIC | Age: 49
End: 2024-01-03

## 2024-01-03 DIAGNOSIS — M25.521 BILATERAL ELBOW JOINT PAIN: Primary | ICD-10-CM

## 2024-01-03 DIAGNOSIS — M25.522 BILATERAL ELBOW JOINT PAIN: Primary | ICD-10-CM

## 2024-01-03 PROCEDURE — 97110 THERAPEUTIC EXERCISES: CPT

## 2024-01-03 PROCEDURE — 97140 MANUAL THERAPY 1/> REGIONS: CPT

## 2024-01-03 PROCEDURE — 97112 NEUROMUSCULAR REEDUCATION: CPT

## 2024-01-03 NOTE — TELEPHONE ENCOUNTER
Can we tell her that the MRI was not approved by her insurance and she should not have it done, she can follow-up with just at her upcoming visit

## 2024-01-03 NOTE — PROGRESS NOTES
Daily Note     Today's date: 1/3/2024  Patient name: Ana Morse  : 1975  MRN: [id number]  Referring provider: Muna Gorman CRNP  Dx:   Encounter Diagnosis     ICD-10-CM    1. Bilateral elbow joint pain  M25.521     M25.522             Start Time: 09  Stop Time: 1016  Total time in clinic (min): 44 minutes    Subjective: Pt reports that her forearms have been feeling much better, but the pain in her elbows remains. She states that the kinesiotape seemed to help her R forearm.       Objective: See treatment diary below      Assessment: Pt has tension in b/l ulnar nerve, improved with repetitions of ulnar nerve tensioners, added to pt's HEP, will monitor prolonged response. PT increased periscapular strengthening, will continue to progress as able. PT will educate pt on self application of kinesiotape NV if favorable response continues.  Pt will continue to benefit from skilled physical therapy in order to improve R elbow mobility, decrease pain, and improve functional tolerance.     Plan: Continue per plan of care.  Progress treatment as tolerated.       Precautions: None   Access Code: [email or web address]    POC expires Unit limit Auth Expiration date PT/OT/ST + Visit Limit?   24 3 23 BOMN                           Visit/Unit Tracking  AUTH Status:  Date 12/7 12/13 12/20 1/3           Required - pending Used 1 2 3 4            Remaining                        Date 12/7 12/13 12/20 1/3         Re-Eval             FOTO             Manuals             CFM  Self 1-2'            IASTM  SC b/l forearms           Kinesiotape    SC R wrist extensors SC b/l wrist extensors         MFDc  B/l forearms            Neuro Re-Ed             Rows   RTB 3x10 GTB 3x10         B/l  shoulder ext   RTB 3x10 GTB 3x10         TB snow angels     RTB 2x10                                                             Ther Ex             UBE  Pulleys 6' Pulleys 6' Pulleys 6'          Wrist flexion 1# 2x10  1# 2x10 2# 2x10        "  Wrist extensor stretch 30\"x3 ea 1' ea 30\"x3 ea 30\"x3 ea         Resisted pronation/supination c bar  Yellow 2x10 ea           TheraBar flex/ext  Yellow 2x10 ea           Supination/pronation hammer  1 weight 2x10 ea 1 weight 2x10 ea 2 weights 2x10 ea         Ulnar nerve tensioner    5\"x20 ea         Wrist extension    1# x15                                   Ther Activity                                       Gait Training                                       Modalities                                            "

## 2024-01-04 DIAGNOSIS — F41.1 GENERALIZED ANXIETY DISORDER: ICD-10-CM

## 2024-01-04 RX ORDER — BUSPIRONE HYDROCHLORIDE 10 MG/1
10 TABLET ORAL 3 TIMES DAILY
Qty: 270 TABLET | Refills: 1 | Status: SHIPPED | OUTPATIENT
Start: 2024-01-04

## 2024-01-05 ENCOUNTER — OFFICE VISIT (OUTPATIENT)
Dept: URGENT CARE | Facility: CLINIC | Age: 49
End: 2024-01-05
Payer: COMMERCIAL

## 2024-01-05 VITALS
DIASTOLIC BLOOD PRESSURE: 76 MMHG | OXYGEN SATURATION: 96 % | RESPIRATION RATE: 18 BRPM | SYSTOLIC BLOOD PRESSURE: 142 MMHG | TEMPERATURE: 97.2 F | HEART RATE: 80 BPM

## 2024-01-05 DIAGNOSIS — J20.9 ACUTE BRONCHITIS, UNSPECIFIED ORGANISM: Primary | ICD-10-CM

## 2024-01-05 PROCEDURE — 99213 OFFICE O/P EST LOW 20 MIN: CPT

## 2024-01-05 RX ORDER — BENZONATATE 100 MG/1
100 CAPSULE ORAL 3 TIMES DAILY PRN
Qty: 20 CAPSULE | Refills: 0 | Status: SHIPPED | OUTPATIENT
Start: 2024-01-05

## 2024-01-05 NOTE — PROGRESS NOTES
[facility]        NAME: Ana Morse is a 48 y.o. female  : 1975    MRN: [id number]  DATE: 2024  TIME: 9:18 AM    Assessment and Plan   Acute bronchitis, unspecified organism [J20.9]  1. Acute bronchitis, unspecified organism  benzonatate (TESSALON PERLES) 100 mg capsule        Start on cough medication to help symptoms.   Educated that symptoms may take 1-3 weeks to resolve.  For cough continue warm fluids, OTC medications such as Mucinex, and throat lozenges.  Given bromfed for symptoms.     Patient Instructions     Bronchitis typically resolves in 1-3 weeks without any medications.   For cough continue warm fluids, OTC medications such as Mucinex, throat lozenges and any prescribed medications if given in office.   Antibiotics are rarely needed.   Multiple clinical studies show that steroid medications have not been effective to treat bronchitis.   Follow up with PCP in 3-5 days.  Proceed to  ER if symptoms worsen.    Chief Complaint     Chief Complaint   Patient presents with    Cold Like Symptoms     Cough, chest congestion, sore throat since , covid negative         History of Present Illness       Presents with 5 days of sick symptoms including cough, sore throat, chest congestion. Believed to be cold symptoms. No fever or chills. Concerned due to dry, frequent cough. Denies history of asthma/lung issues.         Review of Systems   Review of Systems   Constitutional:  Negative for chills, fatigue and fever.   HENT:  Positive for congestion and sore throat.    Respiratory:  Positive for cough. Negative for shortness of breath and wheezing.    Cardiovascular:  Negative for chest pain.   Gastrointestinal:  Negative for abdominal pain.   Genitourinary:  Negative for dysuria.   Musculoskeletal:  Negative for myalgias.   Neurological:  Negative for dizziness.   Psychiatric/Behavioral:  Negative for confusion.          Current Medications       Current Outpatient Medications:      benzonatate (TESSALON PERLES) 100 mg capsule, Take 1 capsule (100 mg total) by mouth 3 (three) times a day as needed for cough, Disp: 20 capsule, Rfl: 0    busPIRone (BUSPAR) 10 mg tablet, Take 1 tablet (10 mg total) by mouth 3 (three) times a day, Disp: 270 tablet, Rfl: 1    Elastic Bandages & Supports (Thumb Splint/Right Medium) MISC, Use in the morning Please dispense right thumb spica (Patient not taking: Reported on 11/7/2023), Disp: 1 each, Rfl: 0    fluocinolone (SYNALAR) 0.025 % ointment, APPLY TO AFFECTED AREA ON THE EYELIDS TWICE A DAY X 7 DAYS MAX AS NEEDED, Disp: , Rfl:     fluticasone (FLONASE) 50 mcg/act nasal spray, 1 spray into each nostril daily, Disp: , Rfl:     levonorgestrel-ethinyl estradiol (Altavera) 0.15-30 MG-MCG per tablet, Take 1 tablet by mouth daily for 28 days, Disp: 84 tablet, Rfl: 4    meloxicam (MOBIC) 7.5 mg tablet, Take 1 tablet (7.5 mg total) by mouth daily as needed for moderate pain, Disp: 30 tablet, Rfl: 3    Multiple Vitamins-Iron (DAILY MULTIPLE VITAMIN/IRON) TABS, Take by mouth, Disp: , Rfl:     Probiotic Product (PROBIOTIC-10 PO), Take by mouth, Disp: , Rfl:     sertraline (ZOLOFT) 50 mg tablet, Take 1 tablet (50 mg total) by mouth daily, Disp: 90 tablet, Rfl: 1    traZODone (DESYREL) 50 mg tablet, Take 1 tablet (50 mg total) by mouth daily at bedtime as needed for sleep, Disp: 90 tablet, Rfl: 1    Current Allergies     Allergies as of 01/05/2024 - Reviewed 01/05/2024   Allergen Reaction Noted    Mold extract [trichophyton] Itching and Sneezing 09/10/2013    Pollen extract Itching and Sneezing 09/10/2013            The following portions of the patient's history were reviewed and updated as appropriate: allergies, current medications, past family history, past medical history, past social history, past surgical history and problem list.     Past Medical History:   Diagnosis Date    HL (hearing loss)     Inability to conceive, female     Tinnitus        Past Surgical  History:   Procedure Laterality Date     SECTION      FOOT SURGERY      TOOTH EXTRACTION         Family History   Problem Relation Age of Onset    No Known Problems Mother     Leukemia Father     No Known Problems Sister     No Known Problems Daughter     Dementia Maternal Grandmother     Parkinsonism Maternal Grandmother     Stroke Maternal Grandmother     Lymphoma Maternal Grandmother     No Known Problems Maternal Aunt     No Known Problems Paternal Aunt     Esophageal cancer Family     Breast cancer Neg Hx          Medications have been verified.        Objective   /76   Pulse 80   Temp (!) 97.2 °F (36.2 °C)   Resp 18   SpO2 96%        Physical Exam     Physical Exam  Vitals reviewed.   Constitutional:       General: She is not in acute distress.     Appearance: Normal appearance.   HENT:      Right Ear: Tympanic membrane, ear canal and external ear normal.      Left Ear: Tympanic membrane, ear canal and external ear normal.      Nose: Nose normal.      Mouth/Throat:      Mouth: Mucous membranes are moist.      Pharynx: Posterior oropharyngeal erythema present.   Eyes:      Conjunctiva/sclera: Conjunctivae normal.   Cardiovascular:      Rate and Rhythm: Normal rate and regular rhythm.      Pulses: Normal pulses.      Heart sounds: Normal heart sounds. No murmur heard.  Pulmonary:      Effort: Pulmonary effort is normal. No respiratory distress.      Breath sounds: Normal breath sounds.   Skin:     General: Skin is warm and dry.   Neurological:      General: No focal deficit present.      Mental Status: She is alert and oriented to person, place, and time.   Psychiatric:         Mood and Affect: Mood normal.         Behavior: Behavior normal.

## 2024-01-05 NOTE — PATIENT INSTRUCTIONS
Bronchitis typically resolves in 1-3 weeks without any medications.   For cough continue warm fluids, OTC medications such as Mucinex, throat lozenges and any prescribed medications if given in office.   Antibiotics are rarely needed.   Multiple clinical studies show that steroid medications have not been effective to treat bronchitis.   Follow up with PCP in 3-5 days.  Proceed to  ER if symptoms worsen.

## 2024-01-08 ENCOUNTER — HOSPITAL ENCOUNTER (OUTPATIENT)
Dept: RADIOLOGY | Facility: HOSPITAL | Age: 49
Discharge: HOME | End: 2024-01-08

## 2024-01-10 ENCOUNTER — APPOINTMENT (OUTPATIENT)
Dept: PHYSICAL THERAPY | Facility: CLINIC | Age: 49
End: 2024-01-10
Payer: COMMERCIAL

## 2024-01-11 ENCOUNTER — RA CDI HCC (OUTPATIENT)
Dept: OTHER | Facility: HOSPITAL | Age: 49
End: 2024-01-11

## 2024-01-11 NOTE — PROGRESS NOTES
HCC coding opportunities       Chart reviewed, no opportunity found: CHART REVIEWED, NO OPPORTUNITY FOUND   This is a reminder to address (resolve/update/assess) ALL HCC (risk adjustment) codes as found on active problem list for 2024 as patient scores reset to zero SHANNAN.  Also, just a reminder to please review and assess all other chronic conditions for 2024     Patients Insurance        Commercial Insurance: [employer]

## 2024-01-17 ENCOUNTER — EVALUATION (OUTPATIENT)
Dept: PHYSICAL THERAPY | Facility: CLINIC | Age: 49
End: 2024-01-17
Payer: COMMERCIAL

## 2024-01-17 DIAGNOSIS — M25.521 BILATERAL ELBOW JOINT PAIN: Primary | ICD-10-CM

## 2024-01-17 DIAGNOSIS — M25.522 BILATERAL ELBOW JOINT PAIN: Primary | ICD-10-CM

## 2024-01-17 PROCEDURE — 97110 THERAPEUTIC EXERCISES: CPT

## 2024-01-17 NOTE — PROGRESS NOTES
PT Discharge    Today's date: 2024  Patient name: Ana Morse  : 1975  MRN: [id number]  Referring provider: Muna Gorman CRNP  Dx:   Encounter Diagnosis     ICD-10-CM    1. Bilateral elbow joint pain  M25.521     M25.522           Start Time: 0846  Stop Time: 09  Total time in clinic (min): 38 minutes    Assessment  Assessment details: Pt is a 48 y.o. female presenting to PT services with c/o b/l elbow pain. Pt has been participating in PT for 6 weeks and has made improvement in regards to b/l elbow mobility, improved b/l  strength, decreased intensity and frequency of pain, and improved functional tolerance. PT educated pt on self application of kinesiotape, pt verbalized understanding. PT and pt have discussed and agreed that pt has met maximum benefit of skilled physical therapy and will continue to benefit from independent performance of HEP. Pt was informed that if she has any questions or concerns she is welcome to contact facility at any time. Pt is discharged from skilled physical therapy.       Goals  STG (3 weeks):  1. Pt will improve R elbow extension AROM to be 0* GOAL MET  2. Pt will improve R forearm supination to be symmetrical to L forearm GOAL MET  3. Pt will report pain at worst as 3/10 or less GOAL MET    LTG (6 weeks):  1. Pt will be independent in HEP GOAL MET  2. Pt will improve R forearm pronation AROM to be symmetrical to L forearm GOAL MET  3. Pt will have no TTP NOT MET  4. Pt will be report pain at worst as 2/10 or less NOT MET    Plan  Therapy options: independent HEP.  Planned therapy interventions: home exercise program  Treatment plan discussed with: patient        Subjective Evaluation    History of Present Illness  Mechanism of injury: Pt reports that she feels about 85% better since beginning PT. Pt states that her forearms feel like a total improvement. She states that her elbows have recently started to feel relief, L > R. She states that her R elbow does  "still give her some pain.   Patient Goals  Patient goals for therapy: decreased pain, increased strength and increased motion  Patient goal: \"to go back to how I was before\"  Pain  Current pain ratin  At best pain ratin  At worst pain ratin  Location: deep in lateral b/l elbows near ulnar groove  Quality: dull ache (variable, TTP)  Alleviating factors: let it pass, Advil, exercises.  Exacerbated by: staying still for too long, walking or running, elbow flexion, pronation.  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[employer])  Hand dominance: right      Diagnostic Tests  X-ray: normal        Objective     Active Range of Motion     Left Elbow   Flexion: WFL  Extension: 0 degrees   Forearm supination: 110 degrees   Forearm pronation: 160 degrees     Right Elbow   Flexion: WFL  Extension: 0 degrees   Forearm supination: 90 degrees   Forearm pronation: 160 degrees with pain    Strength/Myotome Testing     Left Elbow   Flexion: 5  Extension: 5  Forearm supination: 5  Forearm pronation: 5    Right Elbow   Flexion: 5  Extension: 5  Forearm supination: 5  Forearm pronation: 5    Left Wrist/Hand      (2nd hand position)     Trial 1: 65.2    Trial 2: 61.2    Trial 3: 53.5    Average: 59.97    Right Wrist/Hand      (2nd hand position)     Trial 1: 76    Trial 2: 78.4    Trial 3: 73.4    Average: 75.93    Tests     Left Elbow   Negative handshake and Mill's.     Right Elbow   Positive Mill's.   Negative handshake.              Precautions: None   Access Code: [email or web address]    POC expires Unit limit Auth Expiration date PT/OT/ST + Visit Limit?   24 3 23 BOMN                           Visit/Unit Tracking  AUTH Status:  Date 12/7 12/13 12/20 1/3 1/17  " "        Required - pending Used 1 2 3 4 5           Remaining                        Date 12/7 12/13 12/20 1/3 1/17        Re-Eval             FOTO             Manuals             CFM  Self 1-2'            IASTM  SC b/l forearms           Kinesiotape    SC R wrist extensors SC b/l wrist extensors         MFDc  B/l forearms            Neuro Re-Ed             Rows   RTB 3x10 GTB 3x10         B/l  shoulder ext   RTB 3x10 GTB 3x10         TB snow angels     RTB 2x10                                                             Ther Ex             Pt edu     SC - HEP, POC        UBE  Pulleys 6' Pulleys 6' Pulleys 6'  2'/2' + pulleys 2'         Wrist flexion 1# 2x10  1# 2x10 2# 2x10         Wrist extensor stretch 30\"x3 ea 1' ea 30\"x3 ea 30\"x3 ea 30\"x3 ea        Resisted pronation/supination c bar  Yellow 2x10 ea           TheraBar flex/ext  Yellow 2x10 ea           Supination/pronation hammer  1 weight 2x10 ea 1 weight 2x10 ea 2 weights 2x10 ea         Ulnar nerve tensioner    5\"x20 ea         Wrist extension    1# x15                                   Ther Activity                                       Gait Training                                       Modalities                                            "

## 2024-01-18 ENCOUNTER — OFFICE VISIT (OUTPATIENT)
Dept: FAMILY MEDICINE CLINIC | Facility: CLINIC | Age: 49
End: 2024-01-18
Payer: COMMERCIAL

## 2024-01-18 VITALS
HEART RATE: 84 BPM | SYSTOLIC BLOOD PRESSURE: 104 MMHG | RESPIRATION RATE: 12 BRPM | DIASTOLIC BLOOD PRESSURE: 80 MMHG | WEIGHT: 196.2 LBS | OXYGEN SATURATION: 98 % | TEMPERATURE: 98.1 F | HEIGHT: 70 IN | BODY MASS INDEX: 28.09 KG/M2

## 2024-01-18 DIAGNOSIS — R06.09 DOE (DYSPNEA ON EXERTION): Primary | ICD-10-CM

## 2024-01-18 DIAGNOSIS — F41.1 GENERALIZED ANXIETY DISORDER: ICD-10-CM

## 2024-01-18 DIAGNOSIS — G47.00 INSOMNIA, UNSPECIFIED TYPE: ICD-10-CM

## 2024-01-18 DIAGNOSIS — Z00.00 ANNUAL PHYSICAL EXAM: Chronic | ICD-10-CM

## 2024-01-18 PROCEDURE — 99396 PREV VISIT EST AGE 40-64: CPT | Performed by: NURSE PRACTITIONER

## 2024-01-18 PROCEDURE — 93000 ELECTROCARDIOGRAM COMPLETE: CPT | Performed by: NURSE PRACTITIONER

## 2024-01-18 RX ORDER — TRAZODONE HYDROCHLORIDE 50 MG/1
50 TABLET ORAL
Qty: 90 TABLET | Refills: 1 | Status: SHIPPED | OUTPATIENT
Start: 2024-01-18

## 2024-01-18 NOTE — ASSESSMENT & PLAN NOTE
Patient recently noticed shortness of breath with mild exertion.  EKG obtained while in office, no acute changes.  To proceed with stress test, will call with results.

## 2024-01-18 NOTE — ASSESSMENT & PLAN NOTE
Stable.  To continue sertraline 50 mg daily and buspirone 10 mg every 8 hours.  Follow-up in 6 months or sooner if needed.

## 2024-01-18 NOTE — PROGRESS NOTES
ADULT ANNUAL PHYSICAL  [facility] [address] [facility]    NAME: Ana Morse  AGE: 48 y.o. SEX: female  : 1975     DATE: 2024     Assessment and Plan:     Problem List Items Addressed This Visit          Other    Generalized anxiety disorder     Stable.  To continue sertraline 50 mg daily and buspirone 10 mg every 8 hours.  Follow-up in 6 months or sooner if needed.         Relevant Medications    sertraline (ZOLOFT) 50 mg tablet    traZODone (DESYREL) 50 mg tablet    Annual physical exam    Insomnia     Well-controlled on trazodone 50 mg daily.  Refill provided.         Relevant Medications    traZODone (DESYREL) 50 mg tablet    ZEPEDA (dyspnea on exertion) - Primary     Patient recently noticed shortness of breath with mild exertion.  EKG obtained while in office, no acute changes.  To proceed with stress test, will call with results.         Relevant Orders    Stress test only, exercise    POCT ECG (Completed)       Immunizations and preventive care screenings were discussed with patient today. Appropriate education was printed on patient's after visit summary.    Counseling:  Alcohol/drug use: discussed moderation in alcohol intake, the recommendations for healthy alcohol use, and avoidance of illicit drug use.  Dental Health: discussed importance of regular tooth brushing, flossing, and dental visits.  Injury prevention: discussed safety/seat belts, safety helmets, smoke detectors, carbon dioxide detectors, and smoking near bedding or upholstery.  Sexual health: discussed sexually transmitted diseases, partner selection, use of condoms, avoidance of unintended pregnancy, and contraceptive alternatives.  Exercise: the importance of regular exercise/physical activity was discussed. Recommend exercise 3-5 times per week for at least 30 minutes.          Return in about 6 months (around 2024), or if symptoms worsen or fail to improve, for  Recheck.     Chief Complaint:     Chief Complaint   Patient presents with    Physical Exam      History of Present Illness:     Adult Annual Physical   Patient here for a comprehensive physical exam. The patient reports no problems.    Diet and Physical Activity  Diet/Nutrition: well balanced diet.   Exercise: no formal exercise.      Depression Screening  PHQ-2/9 Depression Screening           General Health  Sleep: sleeps well and gets 4-6 hours of sleep on average.   Hearing: normal - bilateral.  Vision: most recent eye exam <1 year ago and wears glasses.   Dental: regular dental visits.       /GYN Health  Follows with gynecology? yes   Patient is: perimenopausal  Last menstrual period: 24  Contraceptive method: oral contraceptives.    Advanced Care Planning  Do you have an advanced directive? yes  Do you have a durable medical power of ? yes     Review of Systems:     Review of Systems   Constitutional: Negative.    HENT: Negative.     Eyes: Negative.    Respiratory:  Positive for shortness of breath (on exertion).    Cardiovascular: Negative.    Gastrointestinal: Negative.    Endocrine: Negative.    Genitourinary: Negative.    Musculoskeletal: Negative.    Skin: Negative.    Allergic/Immunologic: Negative.    Neurological: Negative.    Hematological: Negative.    Psychiatric/Behavioral: Negative.        Past Medical History:     Past Medical History:   Diagnosis Date    HL (hearing loss)     Inability to conceive, female     Tinnitus       Past Surgical History:     Past Surgical History:   Procedure Laterality Date     SECTION      FOOT SURGERY      TOOTH EXTRACTION        Social History:     Social History     Socioeconomic History    Marital status: /Civil Union     Spouse name: None    Number of children: None    Years of education: None    Highest education level: None   Occupational History    None   Tobacco Use    Smoking status: Never     Passive exposure: Never     Smokeless tobacco: Never   Vaping Use    Vaping status: Never Used   Substance and Sexual Activity    Alcohol use: Yes     Comment: social    Drug use: No    Sexual activity: Yes     Birth control/protection: Male Sterilization, Pill   Other Topics Concern    None   Social History Narrative    1 cup tea/day- switched recently to decaf    Exercise - walking    Exercises moderately less than 3 times/wk     Social Determinants of Health     Financial Resource Strain: Not on file   Food Insecurity: Not on file   Transportation Needs: Not on file   Physical Activity: Not on file   Stress: Not on file   Social Connections: Not on file   Intimate Partner Violence: Not on file   Housing Stability: Not on file      Family History:     Family History   Problem Relation Age of Onset    No Known Problems Mother     Leukemia Father     No Known Problems Sister     No Known Problems Daughter     Dementia Maternal Grandmother     Parkinsonism Maternal Grandmother     Stroke Maternal Grandmother     Lymphoma Maternal Grandmother     No Known Problems Maternal Aunt     No Known Problems Paternal Aunt     Esophageal cancer Family     Breast cancer Neg Hx       Current Medications:     Current Outpatient Medications   Medication Sig Dispense Refill    busPIRone (BUSPAR) 10 mg tablet Take 1 tablet (10 mg total) by mouth 3 (three) times a day 270 tablet 1    fluticasone (FLONASE) 50 mcg/act nasal spray 1 spray into each nostril daily      levonorgestrel-ethinyl estradiol (Altavera) 0.15-30 MG-MCG per tablet Take 1 tablet by mouth daily for 28 days 84 tablet 4    Multiple Vitamins-Iron (DAILY MULTIPLE VITAMIN/IRON) TABS Take by mouth      Probiotic Product (PROBIOTIC-10 PO) Take by mouth      sertraline (ZOLOFT) 50 mg tablet Take 1 tablet (50 mg total) by mouth daily 90 tablet 1    traZODone (DESYREL) 50 mg tablet Take 1 tablet (50 mg total) by mouth daily at bedtime as needed for sleep 90 tablet 1    Elastic Bandages & Supports (Thumb  "Splint/Right Medium) MISC Use in the morning Please dispense right thumb spica (Patient not taking: Reported on 11/7/2023) 1 each 0    fluocinolone (SYNALAR) 0.025 % ointment APPLY TO AFFECTED AREA ON THE EYELIDS TWICE A DAY X 7 DAYS MAX AS NEEDED       No current facility-administered medications for this visit.      Allergies:     Allergies   Allergen Reactions    Mold Extract [Trichophyton] Itching and Sneezing    Pollen Extract Itching and Sneezing      Physical Exam:     /80   Pulse 84   Temp 98.1 °F (36.7 °C)   Resp 12   Ht 5' 10\" (1.778 m)   Wt 89 kg (196 lb 3.2 oz)   LMP 12/20/2023   SpO2 98%   BMI 28.15 kg/m²     Physical Exam  Vitals and nursing note reviewed.   Constitutional:       General: She is not in acute distress.     Appearance: Normal appearance. She is well-developed. She is not ill-appearing, toxic-appearing or diaphoretic.   HENT:      Head: Normocephalic and atraumatic.      Right Ear: Tympanic membrane, ear canal and external ear normal.      Left Ear: Tympanic membrane, ear canal and external ear normal.      Nose: Nose normal.      Mouth/Throat:      Mouth: Mucous membranes are moist.      Pharynx: Oropharynx is clear. No oropharyngeal exudate.   Eyes:      Extraocular Movements: Extraocular movements intact.      Conjunctiva/sclera: Conjunctivae normal.      Pupils: Pupils are equal, round, and reactive to light.   Neck:      Thyroid: No thyromegaly.   Cardiovascular:      Rate and Rhythm: Normal rate and regular rhythm.      Pulses: Normal pulses.      Heart sounds: Normal heart sounds. No murmur heard.  Pulmonary:      Effort: Pulmonary effort is normal. No respiratory distress.      Breath sounds: Normal breath sounds. No stridor. No wheezing or rales.   Chest:      Chest wall: No tenderness.   Abdominal:      General: Bowel sounds are normal. There is no distension.      Palpations: Abdomen is soft. There is no mass.      Tenderness: There is no abdominal tenderness. " There is no guarding or rebound.      Hernia: No hernia is present.   Musculoskeletal:         General: Normal range of motion.      Cervical back: Normal range of motion and neck supple.   Lymphadenopathy:      Cervical: No cervical adenopathy.   Skin:     General: Skin is warm and dry.      Capillary Refill: Capillary refill takes less than 2 seconds.   Neurological:      General: No focal deficit present.      Mental Status: She is alert and oriented to person, place, and time.      Cranial Nerves: No cranial nerve deficit.      Gait: Gait normal.   Psychiatric:         Mood and Affect: Mood normal.         Behavior: Behavior normal.         Thought Content: Thought content normal.         Judgment: Judgment normal.          KONRAD Molina  [facility] [address] [facility]

## 2024-02-08 ENCOUNTER — HOSPITAL ENCOUNTER (OUTPATIENT)
Dept: NON INVASIVE DIAGNOSTICS | Facility: HOSPITAL | Age: 49
Discharge: HOME | End: 2024-02-08
Payer: COMMERCIAL

## 2024-02-08 VITALS — DIASTOLIC BLOOD PRESSURE: 76 MMHG | OXYGEN SATURATION: 99 % | HEART RATE: 76 BPM | SYSTOLIC BLOOD PRESSURE: 110 MMHG

## 2024-02-08 DIAGNOSIS — R06.09 DOE (DYSPNEA ON EXERTION): ICD-10-CM

## 2024-02-08 LAB
CHEST PAIN STATEMENT: NORMAL
MAX DIASTOLIC BP: 74 MMHG
MAX HR PERCENT: 107 %
MAX HR: 184 BPM
MAX PREDICTED HEART RATE: 171 BPM
PROTOCOL NAME: NORMAL
RATE PRESSURE PRODUCT: NORMAL
REASON FOR TERMINATION: NORMAL
SL CV STRESS RECOVERY BP: NORMAL MMHG
SL CV STRESS RECOVERY HR: 101 BPM
SL CV STRESS RECOVERY O2 SAT: 98 %
SL CV STRESS STAGE REACHED: 4
STRESS ANGINA INDEX: 0
STRESS BASELINE BP: NORMAL MMHG
STRESS BASELINE HR: 76 BPM
STRESS O2 SAT REST: 99 %
STRESS PEAK HR: 184 BPM
STRESS POST ESTIMATED WORKLOAD: 13.4 METS
STRESS POST EXERCISE DUR MIN: 10 MIN
STRESS POST EXERCISE DUR MIN: 10 MIN
STRESS POST EXERCISE DUR SEC: 30 SEC
STRESS POST EXERCISE DUR SEC: 30 SEC
STRESS POST O2 SAT PEAK: 97 %
STRESS POST PEAK BP: 168 MMHG
STRESS POST PEAK HR: 184 BPM
STRESS POST PEAK SYSTOLIC BP: 168 MMHG
TARGET HR FORMULA: NORMAL
TEST INDICATION: NORMAL

## 2024-02-08 PROCEDURE — 93018 CV STRESS TEST I&R ONLY: CPT | Performed by: INTERNAL MEDICINE

## 2024-02-08 PROCEDURE — 93016 CV STRESS TEST SUPVJ ONLY: CPT | Performed by: INTERNAL MEDICINE

## 2024-02-08 PROCEDURE — 93017 CV STRESS TEST TRACING ONLY: CPT

## 2024-04-02 NOTE — PROGRESS NOTES
Department of Anesthesiology  Postprocedure Note    Patient: Consuelo Raygoza  MRN: [id number]  YOB: 1940  Date of evaluation: 4/2/2024    Procedure Summary       Date: 04/02/24 [facility]    Anesthesia Start: 1349 Anesthesia Stop: 1426    Procedure: Cystoscopy, Left Ureteroscopy Laser Lithotripsy and Left Ureteral Stent Exchange (Left) Diagnosis:       Kidney stone      (Kidney stone [N20.0])    Surgeons: Maksim Noriega MD Responsible Provider: Ulisses Yang DO    Anesthesia Type: General ASA Status: 3            Anesthesia Type: General    Elisa Phase I: Elisa Score: 10    Elisa Phase II: Elisa Score: 10    Anesthesia Post Evaluation    Patient location during evaluation: PACU  Patient participation: complete - patient participated  Level of consciousness: awake and alert  Airway patency: patent  Nausea & Vomiting: no vomiting and no nausea  Cardiovascular status: hemodynamically stable  Respiratory status: acceptable and room air  Hydration status: stable  Pain management: adequate    No notable events documented.   PT Evaluation     Today's date: 2023  Patient name: Tess Daigle  : 1975  MRN: [id number]  Referring provider: KONRAD Casillas  Dx:   Encounter Diagnosis     ICD-10-CM    1. Bilateral elbow joint pain  M25.521 Ambulatory Referral to Physical Therapy    M25.522           Start Time: 0932  Stop Time: 7748  Total time in clinic (min): 39 minutes    Assessment  Assessment details: Pt is a 51 y.o. female presenting to PT services with c/o b/l elbow pain. Pt has signs and symptoms suggestive of lateral epicondylitis evidenced by positive Mill's test and handshake test, as well as TTP along common wrist extensor tendons. Pt has limited R elbow extension and has limited supination and pronation AROM in comparison to L elbow. Pt has strength WNL. PT added cross friction massage to common extensor tendon, wrist extensor stretch, and wrist flexion with dumbbell to pt's HEP, pt verbalized and demonstrated understanding of proper form. Pt is a good candidate for skilled physical therapy in order to improve elbow mobility, decrease pain with function, and increase functional tolerance. Impairments: abnormal or restricted ROM, activity intolerance, lacks appropriate home exercise program and pain with function    Goals  STG (3 weeks):  1. Pt will improve R elbow extension AROM to be 0*  2. Pt will improve R forearm supination to be symmetrical to L forearm  3. Pt will report pain at worst as 3/10 or less     LTG (6 weeks):  1. Pt will be independent in HEP  2. Pt will improve R forearm pronation AROM to be symmetrical to L forearm  3. Pt will have no TTP  4.  Pt will be report pain at worst as 2/10 or less     Plan  Patient would benefit from: skilled physical therapy  Planned modality interventions: biofeedback, TENS, thermotherapy: hydrocollator packs, cryotherapy and unattended electrical stimulation  Planned therapy interventions: IASTM, joint mobilization, kinesiology taping, manual therapy, massage, nerve gliding, neuromuscular re-education, patient education, postural training, strengthening, stretching, therapeutic activities, therapeutic exercise, home exercise program, flexibility and functional ROM exercises  Frequency: 1x week  Duration in weeks: 6  Plan of Care beginning date: 2023  Plan of Care expiration date: 2024  Treatment plan discussed with: patient        Subjective Evaluation    History of Present Illness  Mechanism of injury: Pt reports 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 She states there was a lot of packing and repetition and her wrists were very painful as well as her  elbows. The doctor recommended a spica for her wrists and that helped a lot. Then about 2 months ago her elbows started aching and it's equal on both sides. She states that some days are worse than others. Patient Goals  Patient goals for therapy: decreased pain, increased strength and increased motion  Patient goal: "to go back to how I was before"  Pain  Current pain ratin  At best pain ratin  At worst pain ratin  Location: deep in lateral b/l elbows near ulnar groove  Quality: dull ache (constant, annoying)  Alleviating factors: let it pass, Advil. Exacerbated by: staying still for too long, walking or running, elbow flexion, pronation.     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 [employer])  Hand dominance: right      Diagnostic Tests  X-ray: normal        Objective     Active Range of Motion     Left Elbow   Flexion: WFL  Extension: 0 degrees   Forearm supination: 130 degrees   Forearm pronation: 164 degrees with pain    Right Elbow   Flexion: WFL  Extension: 16 degrees Forearm supination: 119 degrees with pain  Forearm pronation: 156 degrees with pain    Strength/Myotome Testing     Left Elbow   Flexion: 5  Extension: 5  Forearm supination: 5  Forearm pronation: 5    Right Elbow   Flexion: 5  Extension: 5  Forearm supination: 5  Forearm pronation: 5    Left Wrist/Hand      (2nd hand position)     Trial 1: 60.9    Trial 2: 58.8    Trial 3: 54.5    Average: 58.07    Right Wrist/Hand      (2nd hand position)     Trial 1: 67.5    Trial 2: 65.9    Trial 3: 69.8    Average: 67.73    Tests     Left Elbow   Positive handshake and Mill's. Right Elbow   Positive handshake and Mill's. Precautions: None   Access Code: [facility]    POC expires Unit limit Auth Expiration date PT/OT/ST + Visit Limit?    1/17/24 3 12/31/23 BOMN                           Visit/Unit Tracking  AUTH Status:  Date 12/7              Required - pending Used 1               Remaining                        Date 12/7            Re-Eval             FOTO             Manuals             CFM  Self 1-2'            IASTM             Kinesiotape                           Neuro Re-Ed                                                                                                        Ther Ex             UBE             Wrist flexion 1# 2x10            Wrist extensor stretch 30"x3 ea            Resisted pronation/supination c bar                                                                 Ther Activity                                       Gait Training                                       Modalities

## 2024-04-04 ENCOUNTER — ANNUAL EXAM (OUTPATIENT)
Age: 49
End: 2024-04-04
Payer: COMMERCIAL

## 2024-04-04 VITALS
SYSTOLIC BLOOD PRESSURE: 118 MMHG | BODY MASS INDEX: 29.06 KG/M2 | HEIGHT: 69 IN | DIASTOLIC BLOOD PRESSURE: 78 MMHG | WEIGHT: 196.2 LBS

## 2024-04-04 DIAGNOSIS — Z12.31 ENCOUNTER FOR SCREENING MAMMOGRAM FOR MALIGNANT NEOPLASM OF BREAST: Primary | ICD-10-CM

## 2024-04-04 DIAGNOSIS — Z30.41 ENCOUNTER FOR SURVEILLANCE OF CONTRACEPTIVE PILLS: ICD-10-CM

## 2024-04-04 DIAGNOSIS — N92.0 MENORRHAGIA WITH REGULAR CYCLE: ICD-10-CM

## 2024-04-04 PROCEDURE — S0612 ANNUAL GYNECOLOGICAL EXAMINA: HCPCS | Performed by: STUDENT IN AN ORGANIZED HEALTH CARE EDUCATION/TRAINING PROGRAM

## 2024-04-04 RX ORDER — LEVONORGESTREL AND ETHINYL ESTRADIOL 0.15-0.03
1 KIT ORAL DAILY
Qty: 84 TABLET | Refills: 4 | Status: SHIPPED | OUTPATIENT
Start: 2024-04-04

## 2024-04-04 NOTE — PROGRESS NOTES
Ana Morse  1975    Assessment and Plan:  Yearly exam without abnormality.     -Pap up to date. We reviewed ASCCP guidelines for Pap testing today.   -Mammo slip provided   -Reviewed physiology/side effects of SSRIs. Discussed Wellbutrin often used for libido, also effective for anxiety/depression. She will discuss her overall management with PCP.     RTO one year for yearly exam or sooner as needed.        CC:  Yearly exam    S:  48 y.o. female here for yearly exam.       LMP 3/13   Contraception: OCP   Last Pap: 2023 NILM/HPV-  Last Mammo: 2023 BIRADS1  Last Colonoscopy: 2015, 10yr recall     Non-smoker, social drinker  Exercises Regularly    Doing ok overall.     Her cycles are regular, not heavy or crampy.     Sexual activity: She is rarely sexually active due to lack of interest, time, 's ED. Denies pain, bleeding or dryness when she does have sex, but can no longer have an orgasm. All of this is directly related to when she started antidepressants.     STD testing:  She does not want STD testing today.     Family hx of breast cancer: denies   Family hx of ovarian cancer: denies   Family hx of colon cancer: denies    Denies hot flushes, dyspareunia, abnormal uterine bleeding, urinary/fecal incontinence, changes in energy levels, mood.       Current Outpatient Medications:     busPIRone (BUSPAR) 10 mg tablet, Take 1 tablet (10 mg total) by mouth 3 (three) times a day, Disp: 270 tablet, Rfl: 1    fluocinolone (SYNALAR) 0.025 % ointment, APPLY TO AFFECTED AREA ON THE EYELIDS TWICE A DAY X 7 DAYS MAX AS NEEDED, Disp: , Rfl:     fluticasone (FLONASE) 50 mcg/act nasal spray, 1 spray into each nostril daily, Disp: , Rfl:     levonorgestrel-ethinyl estradiol (Altavera) 0.15-30 MG-MCG per tablet, Take 1 tablet by mouth daily, Disp: 84 tablet, Rfl: 4    Multiple Vitamins-Iron (DAILY MULTIPLE VITAMIN/IRON) TABS, Take by mouth, Disp: , Rfl:     Probiotic Product (PROBIOTIC-10 PO), Take by mouth,  Disp: , Rfl:     sertraline (ZOLOFT) 50 mg tablet, Take 1 tablet (50 mg total) by mouth daily, Disp: 90 tablet, Rfl: 1    traZODone (DESYREL) 50 mg tablet, Take 1 tablet (50 mg total) by mouth daily at bedtime as needed for sleep, Disp: 90 tablet, Rfl: 1    Elastic Bandages & Supports (Thumb Splint/Right Medium) MISC, Use in the morning Please dispense right thumb spica (Patient not taking: Reported on 11/7/2023), Disp: 1 each, Rfl: 0  Social History     Socioeconomic History    Marital status: /Civil Union     Spouse name: Not on file    Number of children: Not on file    Years of education: Not on file    Highest education level: Not on file   Occupational History    Not on file   Tobacco Use    Smoking status: Never     Passive exposure: Never    Smokeless tobacco: Never   Vaping Use    Vaping status: Never Used   Substance and Sexual Activity    Alcohol use: Yes     Comment: social    Drug use: No    Sexual activity: Yes     Birth control/protection: Male Sterilization, Pill   Other Topics Concern    Not on file   Social History Narrative    1 cup tea/day- switched recently to decaf    Exercise - walking    Exercises moderately less than 3 times/wk     Social Determinants of Health     Financial Resource Strain: Not on file   Food Insecurity: Not on file   Transportation Needs: Not on file   Physical Activity: Not on file   Stress: Not on file   Social Connections: Not on file   Intimate Partner Violence: Not on file   Housing Stability: Not on file     Family History   Problem Relation Age of Onset    No Known Problems Mother     Leukemia Father     No Known Problems Sister     No Known Problems Daughter     Dementia Maternal Grandmother     Parkinsonism Maternal Grandmother     Stroke Maternal Grandmother     Lymphoma Maternal Grandmother     No Known Problems Maternal Aunt     No Known Problems Paternal Aunt     Esophageal cancer Family     Breast cancer Neg Hx       Past Medical History:   Diagnosis  "Date    HL (hearing loss)     Inability to conceive, female     Tinnitus         Review of Systems   Respiratory: Negative.    Cardiovascular: Negative.    Gastrointestinal: Negative for constipation and diarrhea.   Genitourinary: Negative for difficulty urinating, pelvic pain, vaginal bleeding, vaginal discharge, itching or odor.    O:  Blood pressure 118/78, height 5' 9\" (1.753 m), weight 89 kg (196 lb 3.2 oz), last menstrual period 03/13/2024, not currently breastfeeding.    Patient appears well and is not in distress  Neck is supple without masses  Breasts are symmetrical without mass, tenderness, nipple discharge, skin changes or adenopathy.   Abdomen is soft and nontender without masses.   External genitals are normal without lesions or rashes.  Urethral meatus and urethra are normal  Bladder is normal to palpation  Vagina is normal without discharge or bleeding.   Cervix is normal without discharge or lesion.   Uterus is normal, mobile, nontender without palpable mass.  Adnexa are normal, nontender, without palpable mass.    "

## 2024-04-26 DIAGNOSIS — F41.1 GENERALIZED ANXIETY DISORDER: ICD-10-CM

## 2024-04-27 RX ORDER — BUSPIRONE HYDROCHLORIDE 10 MG/1
10 TABLET ORAL 3 TIMES DAILY
Qty: 270 TABLET | Refills: 1 | Status: SHIPPED | OUTPATIENT
Start: 2024-04-27

## 2024-07-15 ENCOUNTER — OFFICE VISIT (OUTPATIENT)
Dept: FAMILY MEDICINE CLINIC | Facility: CLINIC | Age: 49
End: 2024-07-15
Payer: COMMERCIAL

## 2024-07-15 VITALS
DIASTOLIC BLOOD PRESSURE: 76 MMHG | BODY MASS INDEX: 28.38 KG/M2 | OXYGEN SATURATION: 98 % | RESPIRATION RATE: 18 BRPM | HEIGHT: 69 IN | WEIGHT: 191.6 LBS | HEART RATE: 70 BPM | SYSTOLIC BLOOD PRESSURE: 116 MMHG

## 2024-07-15 DIAGNOSIS — F41.1 GENERALIZED ANXIETY DISORDER: ICD-10-CM

## 2024-07-15 DIAGNOSIS — G47.00 INSOMNIA, UNSPECIFIED TYPE: Primary | ICD-10-CM

## 2024-07-15 PROCEDURE — 99214 OFFICE O/P EST MOD 30 MIN: CPT | Performed by: NURSE PRACTITIONER

## 2024-07-15 RX ORDER — BUPROPION HYDROCHLORIDE 150 MG/1
150 TABLET ORAL EVERY MORNING
Qty: 30 TABLET | Refills: 1 | Status: SHIPPED | OUTPATIENT
Start: 2024-07-15 | End: 2025-01-11

## 2024-07-15 RX ORDER — BUSPIRONE HYDROCHLORIDE 10 MG/1
20 TABLET ORAL 2 TIMES DAILY
Start: 2024-07-15

## 2024-07-15 NOTE — ASSESSMENT & PLAN NOTE
Patient continues to experience decrease in libido since the initiation of sertraline.  To continue buspirone 20 mg every 12 hours.  Will try bupropion 150 mg XL daily.  Counseled on how to titrate off sertraline.  To follow-up in 4 to 6 weeks or sooner if needed.

## 2024-07-15 NOTE — PROGRESS NOTES
Assessment/Plan:    Generalized anxiety disorder  Patient continues to experience decrease in libido since the initiation of sertraline.  To continue buspirone 20 mg every 12 hours.  Will try bupropion 150 mg XL daily.  Counseled on how to titrate off sertraline.  To follow-up in 4 to 6 weeks or sooner if needed.    Insomnia  Stable.  To continue trazodone 50 mg as needed for sleep.       Diagnoses and all orders for this visit:    Insomnia, unspecified type  -     buPROPion (WELLBUTRIN XL) 150 mg 24 hr tablet; Take 1 tablet (150 mg total) by mouth every morning    Generalized anxiety disorder  -     busPIRone (BUSPAR) 10 mg tablet; Take 2 tablets (20 mg total) by mouth 2 (two) times a day          Subjective:      Patient ID: Ana Morse is a 48 y.o. female.    Ana presents for a follow-up.  She reports noting a low sex drive since initiating sertraline.  It has become problematic for her and would like to try a different medication.  She also recently started an exercise program after noticing some weight gain.  Otherwise, she is feeling well.        The following portions of the patient's history were reviewed and updated as appropriate: She   Patient Active Problem List    Diagnosis Date Noted    ZEPEDA (dyspnea on exertion) 01/18/2024    Bilateral elbow joint pain 10/04/2023    De Quervain's tenosynovitis, right 05/22/2023    Insomnia 09/13/2021    Generalized anxiety disorder 04/05/2021    Annual physical exam 04/05/2021    Dense breast 01/15/2019    Intermittent constipation 05/24/2018     Current Outpatient Medications   Medication Sig Dispense Refill    buPROPion (WELLBUTRIN XL) 150 mg 24 hr tablet Take 1 tablet (150 mg total) by mouth every morning 30 tablet 1    busPIRone (BUSPAR) 10 mg tablet Take 2 tablets (20 mg total) by mouth 2 (two) times a day      fluocinolone (SYNALAR) 0.025 % ointment APPLY TO AFFECTED AREA ON THE EYELIDS TWICE A DAY X 7 DAYS MAX AS NEEDED      fluticasone (FLONASE) 50 mcg/act  "nasal spray 1 spray into each nostril daily      levonorgestrel-ethinyl estradiol (Altavera) 0.15-30 MG-MCG per tablet Take 1 tablet by mouth daily 84 tablet 4    Multiple Vitamins-Iron (DAILY MULTIPLE VITAMIN/IRON) TABS Take by mouth      Probiotic Product (PROBIOTIC-10 PO) Take by mouth      traZODone (DESYREL) 50 mg tablet Take 1 tablet (50 mg total) by mouth daily at bedtime as needed for sleep 90 tablet 1     No current facility-administered medications for this visit.     She is allergic to mold extract [trichophyton] and pollen extract..    Review of Systems   Constitutional: Negative.    HENT: Negative.     Eyes: Negative.    Respiratory: Negative.     Cardiovascular: Negative.    Gastrointestinal: Negative.    Endocrine: Negative.    Genitourinary:  Positive for menstrual problem.   Musculoskeletal: Negative.    Skin: Negative.    Allergic/Immunologic: Negative.    Neurological: Negative.    Hematological: Negative.    Psychiatric/Behavioral: Negative.           /76 (BP Location: Left arm, Patient Position: Sitting)   Pulse 70   Resp 18   Ht 5' 9\" (1.753 m)   Wt 86.9 kg (191 lb 9.6 oz)   SpO2 98%   BMI 28.29 kg/m²     Objective:     Physical Exam  Vitals and nursing note reviewed.   Constitutional:       General: She is not in acute distress.     Appearance: Normal appearance. She is well-developed. She is not ill-appearing, toxic-appearing or diaphoretic.   HENT:      Head: Normocephalic and atraumatic.   Eyes:      Conjunctiva/sclera: Conjunctivae normal.   Cardiovascular:      Rate and Rhythm: Normal rate and regular rhythm.      Heart sounds: Normal heart sounds. No murmur heard.  Pulmonary:      Effort: Pulmonary effort is normal. No respiratory distress.      Breath sounds: Normal breath sounds. No wheezing or rales.   Chest:      Chest wall: No tenderness.   Abdominal:      General: Bowel sounds are normal. There is no distension.      Palpations: Abdomen is soft. There is no mass.      " Tenderness: There is no abdominal tenderness. There is no guarding or rebound.      Hernia: No hernia is present.   Musculoskeletal:      Cervical back: Neck supple.   Skin:     General: Skin is warm and dry.      Capillary Refill: Capillary refill takes less than 2 seconds.   Neurological:      General: No focal deficit present.      Mental Status: She is alert and oriented to person, place, and time.   Psychiatric:         Mood and Affect: Mood normal.         Behavior: Behavior normal.         Thought Content: Thought content normal.         Judgment: Judgment normal.

## 2024-08-05 ENCOUNTER — HOSPITAL ENCOUNTER (OUTPATIENT)
Age: 49
Discharge: HOME | End: 2024-08-05
Payer: COMMERCIAL

## 2024-08-05 DIAGNOSIS — Z12.31 ENCOUNTER FOR SCREENING MAMMOGRAM FOR MALIGNANT NEOPLASM OF BREAST: ICD-10-CM

## 2024-08-05 PROCEDURE — 77067 SCR MAMMO BI INCL CAD: CPT

## 2024-08-05 PROCEDURE — 77063 BREAST TOMOSYNTHESIS BI: CPT

## 2024-08-10 DIAGNOSIS — G47.00 INSOMNIA, UNSPECIFIED TYPE: ICD-10-CM

## 2024-08-11 RX ORDER — BUPROPION HYDROCHLORIDE 150 MG/1
150 TABLET ORAL EVERY MORNING
Qty: 90 TABLET | Refills: 1 | Status: SHIPPED | OUTPATIENT
Start: 2024-08-11

## 2024-08-28 ENCOUNTER — OFFICE VISIT (OUTPATIENT)
Dept: FAMILY MEDICINE CLINIC | Facility: CLINIC | Age: 49
End: 2024-08-28
Payer: COMMERCIAL

## 2024-08-28 VITALS
HEART RATE: 92 BPM | SYSTOLIC BLOOD PRESSURE: 120 MMHG | WEIGHT: 189.2 LBS | OXYGEN SATURATION: 97 % | HEIGHT: 69 IN | DIASTOLIC BLOOD PRESSURE: 80 MMHG | BODY MASS INDEX: 28.02 KG/M2

## 2024-08-28 DIAGNOSIS — H10.13 ALLERGIC CONJUNCTIVITIS OF BOTH EYES: Primary | ICD-10-CM

## 2024-08-28 PROCEDURE — 99213 OFFICE O/P EST LOW 20 MIN: CPT | Performed by: NURSE PRACTITIONER

## 2024-08-28 RX ORDER — AZELASTINE HYDROCHLORIDE 0.5 MG/ML
1 SOLUTION/ DROPS OPHTHALMIC 2 TIMES DAILY
Qty: 6 ML | Refills: 0 | Status: SHIPPED | OUTPATIENT
Start: 2024-08-28

## 2024-08-28 RX ORDER — CROMOLYN SODIUM 40 MG/ML
1 SOLUTION/ DROPS OPHTHALMIC 4 TIMES DAILY
Qty: 10 ML | Refills: 0 | Status: SHIPPED | OUTPATIENT
Start: 2024-08-28

## 2024-08-28 NOTE — PROGRESS NOTES
Ambulatory Visit  Name: Ana Morse      : 1975      MRN: [id number]  Encounter Provider: KONRAD Castillo  Encounter Date: 2024   Encounter department: [facility] [address]    Assessment & Plan   1. Allergic conjunctivitis of both eyes  Comments:  To avoid scratching/irritation of eye humidified air, avoid eye make-up, medications as prescribed. To return for increased pain, redness, drainage.  Orders:  -     cromolyn (OPTICROM) 4 % ophthalmic solution; Administer 1 drop to both eyes 4 (four) times a day  -     azelastine (OPTIVAR) 0.05 % ophthalmic solution; Administer 1 drop to both eyes 2 (two) times a day       History of Present Illness     Eye Problem   Both eyes are affected. This is a new problem. The current episode started yesterday. The problem occurs constantly. The problem has been unchanged. 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 The pain is at a severity of 0/10. The patient is experiencing no pain. There is No known exposure to pink eye. She Does not wear contacts. Associated symptoms include an eye discharge, eye redness and itching. Pertinent negatives include no blurred vision, double vision, fever, foreign body sensation, nausea, photophobia, recent URI or vomiting. She has tried eye drops for the symptoms. The treatment provided no relief.       Review of Systems   Constitutional:  Negative for chills and fever.   HENT:  Negative for congestion, ear pain, postnasal drip, rhinorrhea, sinus pressure, sinus pain, sneezing and sore throat.    Eyes:  Positive for discharge, redness and itching. Negative for blurred vision, double vision, photophobia, pain and visual disturbance.   Respiratory:  Positive for cough. Negative for chest tightness, shortness of breath and wheezing.    Cardiovascular:  Negative for chest pain.   Gastrointestinal:  Negative for nausea and vomiting.   Musculoskeletal:   Negative for arthralgias and myalgias.   Neurological:  Negative for dizziness, light-headedness and headaches.   Hematological:  Negative for adenopathy.   Psychiatric/Behavioral:  The patient is not nervous/anxious.      Pertinent Medical History         Medical History Reviewed by provider this encounter:  Tobacco  Allergies  Meds  Med Hx  Surg Hx  Fam Hx  Soc Hx      Past Medical History   Past Medical History:   Diagnosis Date    HL (hearing loss)     Inability to conceive, female     Tinnitus      Past Surgical History:   Procedure Laterality Date     SECTION      FOOT SURGERY      TOOTH EXTRACTION       Family History   Problem Relation Age of Onset    No Known Problems Mother     Leukemia Father     No Known Problems Sister     No Known Problems Daughter     Dementia Maternal Grandmother     Parkinsonism Maternal Grandmother     Stroke Maternal Grandmother     Lymphoma Maternal Grandmother     No Known Problems Paternal Grandmother     No Known Problems Maternal Aunt     No Known Problems Paternal Aunt     Esophageal cancer Family     Breast cancer Neg Hx      Current Outpatient Medications on File Prior to Visit   Medication Sig Dispense Refill    buPROPion (WELLBUTRIN XL) 150 mg 24 hr tablet TAKE 1 TABLET BY MOUTH EVERY DAY IN THE MORNING 90 tablet 1    busPIRone (BUSPAR) 10 mg tablet Take 2 tablets (20 mg total) by mouth 2 (two) times a day      fluocinolone (SYNALAR) 0.025 % ointment APPLY TO AFFECTED AREA ON THE EYELIDS TWICE A DAY X 7 DAYS MAX AS NEEDED      fluticasone (FLONASE) 50 mcg/act nasal spray 1 spray into each nostril daily      levonorgestrel-ethinyl estradiol (Altavera) 0.15-30 MG-MCG per tablet Take 1 tablet by mouth daily 84 tablet 4    Multiple Vitamins-Iron (DAILY MULTIPLE VITAMIN/IRON) TABS Take by mouth      Probiotic Product (PROBIOTIC-10 PO) Take by mouth      traZODone (DESYREL) 50 mg tablet Take 1 tablet (50 mg total) by mouth daily at bedtime as needed for sleep 90  "tablet 1     No current facility-administered medications on file prior to visit.     Allergies   Allergen Reactions    Mold Extract [Trichophyton] Itching and Sneezing    Pollen Extract Itching and Sneezing      Current Outpatient Medications on File Prior to Visit   Medication Sig Dispense Refill    buPROPion (WELLBUTRIN XL) 150 mg 24 hr tablet TAKE 1 TABLET BY MOUTH EVERY DAY IN THE MORNING 90 tablet 1    busPIRone (BUSPAR) 10 mg tablet Take 2 tablets (20 mg total) by mouth 2 (two) times a day      fluocinolone (SYNALAR) 0.025 % ointment APPLY TO AFFECTED AREA ON THE EYELIDS TWICE A DAY X 7 DAYS MAX AS NEEDED      fluticasone (FLONASE) 50 mcg/act nasal spray 1 spray into each nostril daily      levonorgestrel-ethinyl estradiol (Altavera) 0.15-30 MG-MCG per tablet Take 1 tablet by mouth daily 84 tablet 4    Multiple Vitamins-Iron (DAILY MULTIPLE VITAMIN/IRON) TABS Take by mouth      Probiotic Product (PROBIOTIC-10 PO) Take by mouth      traZODone (DESYREL) 50 mg tablet Take 1 tablet (50 mg total) by mouth daily at bedtime as needed for sleep 90 tablet 1     No current facility-administered medications on file prior to visit.      Social History     Tobacco Use    Smoking status: Never     Passive exposure: Never    Smokeless tobacco: Never   Vaping Use    Vaping status: Never Used   Substance and Sexual Activity    Alcohol use: Yes     Comment: social    Drug use: No    Sexual activity: Yes     Birth control/protection: Male Sterilization, Pill     Objective     /80 (BP Location: Left arm, Patient Position: Sitting, Cuff Size: Standard)   Pulse 92   Ht 5' 9\" (1.753 m)   Wt 85.8 kg (189 lb 3.2 oz)   LMP 08/28/2024 (Exact Date)   SpO2 97%   BMI 27.94 kg/m²     Physical Exam  Vitals reviewed.   Constitutional:       General: She is not in acute distress.     Appearance: Normal appearance. She is not ill-appearing.   HENT:      Head: Normocephalic and atraumatic.      Right Ear: Tympanic membrane, ear " canal and external ear normal.      Left Ear: Tympanic membrane, ear canal and external ear normal.      Nose: Nose normal. No congestion.      Mouth/Throat:      Mouth: Mucous membranes are moist.      Pharynx: Oropharynx is clear.   Eyes:      General: Lids are normal.         Right eye: No discharge.         Left eye: No discharge.      Conjunctiva/sclera:      Right eye: Right conjunctiva is injected. No exudate.     Left eye: Left conjunctiva is injected. No exudate.     Pupils: Pupils are equal, round, and reactive to light.   Cardiovascular:      Rate and Rhythm: Normal rate and regular rhythm.      Pulses: Normal pulses.      Heart sounds: Normal heart sounds. No murmur heard.  Pulmonary:      Effort: Pulmonary effort is normal.      Breath sounds: Normal breath sounds.   Musculoskeletal:         General: Normal range of motion.      Cervical back: Normal range of motion and neck supple.   Lymphadenopathy:      Cervical: No cervical adenopathy.   Skin:     General: Skin is warm and dry.   Neurological:      General: No focal deficit present.      Mental Status: She is alert and oriented to person, place, and time.   Psychiatric:         Mood and Affect: Mood normal.         Behavior: Behavior normal.       Administrative Statements

## 2024-09-19 DIAGNOSIS — H10.13 ALLERGIC CONJUNCTIVITIS OF BOTH EYES: ICD-10-CM

## 2024-09-19 RX ORDER — AZELASTINE HYDROCHLORIDE 0.5 MG/ML
SOLUTION/ DROPS OPHTHALMIC
Qty: 18 ML | Refills: 1 | Status: SHIPPED | OUTPATIENT
Start: 2024-09-19

## 2024-09-23 DIAGNOSIS — G47.00 INSOMNIA, UNSPECIFIED TYPE: ICD-10-CM

## 2024-09-23 RX ORDER — TRAZODONE HYDROCHLORIDE 50 MG/1
50 TABLET, FILM COATED ORAL
Qty: 90 TABLET | Refills: 1 | Status: SHIPPED | OUTPATIENT
Start: 2024-09-23

## 2025-01-30 DIAGNOSIS — F41.1 GENERALIZED ANXIETY DISORDER: Primary | ICD-10-CM

## 2025-03-26 DIAGNOSIS — G47.00 INSOMNIA, UNSPECIFIED TYPE: ICD-10-CM

## 2025-03-27 DIAGNOSIS — F41.1 GENERALIZED ANXIETY DISORDER: ICD-10-CM

## 2025-03-27 RX ORDER — BUSPIRONE HYDROCHLORIDE 10 MG/1
10 TABLET ORAL 3 TIMES DAILY
Qty: 270 TABLET | Refills: 1 | Status: SHIPPED | OUTPATIENT
Start: 2025-03-27

## 2025-03-27 RX ORDER — TRAZODONE HYDROCHLORIDE 50 MG/1
50 TABLET ORAL
Qty: 90 TABLET | Refills: 1 | Status: SHIPPED | OUTPATIENT
Start: 2025-03-27

## 2025-04-08 ENCOUNTER — ANNUAL EXAM (OUTPATIENT)
Age: 50
End: 2025-04-08
Payer: COMMERCIAL

## 2025-04-08 VITALS
WEIGHT: 199.8 LBS | SYSTOLIC BLOOD PRESSURE: 134 MMHG | HEIGHT: 69 IN | BODY MASS INDEX: 29.59 KG/M2 | DIASTOLIC BLOOD PRESSURE: 72 MMHG

## 2025-04-08 DIAGNOSIS — Z12.11 SCREENING FOR COLON CANCER: ICD-10-CM

## 2025-04-08 DIAGNOSIS — Z12.31 ENCOUNTER FOR SCREENING MAMMOGRAM FOR MALIGNANT NEOPLASM OF BREAST: ICD-10-CM

## 2025-04-08 DIAGNOSIS — Z30.41 ENCOUNTER FOR SURVEILLANCE OF CONTRACEPTIVE PILLS: ICD-10-CM

## 2025-04-08 DIAGNOSIS — Z01.419 ENCOUNTER FOR GYNECOLOGICAL EXAMINATION (GENERAL) (ROUTINE) WITHOUT ABNORMAL FINDINGS: Primary | ICD-10-CM

## 2025-04-08 DIAGNOSIS — N92.0 MENORRHAGIA WITH REGULAR CYCLE: ICD-10-CM

## 2025-04-08 PROCEDURE — S0612 ANNUAL GYNECOLOGICAL EXAMINA: HCPCS | Performed by: STUDENT IN AN ORGANIZED HEALTH CARE EDUCATION/TRAINING PROGRAM

## 2025-04-08 RX ORDER — LEVONORGESTREL AND ETHINYL ESTRADIOL 0.15-0.03
1 KIT ORAL DAILY
Qty: 84 TABLET | Refills: 4 | Status: SHIPPED | OUTPATIENT
Start: 2025-04-08

## 2025-04-08 NOTE — PROGRESS NOTES
Ana Morse   1975    CC:  Yearly exam    A:  Yearly exam.     Problem List Items Addressed This Visit    None  Visit Diagnoses         Encounter for gynecological examination (general) (routine) without abnormal findings    -  Primary      Encounter for screening mammogram for malignant neoplasm of breast        Relevant Orders    Mammo screening bilateral w 3d and cad      Screening for colon cancer        Relevant Orders    Ambulatory Referral to Gastroenterology      Menorrhagia with regular cycle        Relevant Medications    levonorgestrel-ethinyl estradiol (Altavera) 0.15-30 MG-MCG per tablet      Encounter for surveillance of contraceptive pills        Relevant Medications    levonorgestrel-ethinyl estradiol (Altavera) 0.15-30 MG-MCG per tablet            P:   Pap up to date. We reviewed ASCCP guidelines for Pap testing.   Mammo ordered   Reviewed perimenopause and menopausal transition. Difficult to gauge when using OCPs, so recommend trial to assess menses off OCPs. If amenorrhea, can d/c, otherwise, no contraindications at this time   Colonoscopy due August     RTO one year for yearly exam or sooner as needed.      S:  49 y.o. female here for yearly exam. She is postmenopausal and has had no vaginal bleeding.  She denies vaginal discharge, itching, odor or dryness.     Sexual activity: She is only intermittently sexually active due to her lack of interest and orgasm, her 's recent injury (herniated disc).     STD testing: She does not want STD testing today.     Menses have been very light, regular. However, she missed both January and February, was ecxited for possible menopause, but then did have a cycle in March.        Last Pap: 2023 NILM/HPV -  Last Mammo: 2024 BIRADS 1  Last Colonoscopy: 2015, 10yr recall     Non-smoker, social drinker  Exercises regularly, but frustrated by continued weight gain    Family hx of breast cancer: denies   Family hx of ovarian cancer:  denies  Family hx of colon cancer: denies       Current Outpatient Medications:     busPIRone (BUSPAR) 10 mg tablet, TAKE 1 TABLET BY MOUTH THREE TIMES A DAY, Disp: 270 tablet, Rfl: 1    fluticasone (FLONASE) 50 mcg/act nasal spray, 1 spray into each nostril daily, Disp: , Rfl:     levonorgestrel-ethinyl estradiol (Altavera) 0.15-30 MG-MCG per tablet, Take 1 tablet by mouth daily, Disp: 84 tablet, Rfl: 4    Multiple Vitamins-Iron (DAILY MULTIPLE VITAMIN/IRON) TABS, Take by mouth, Disp: , Rfl:     Probiotic Product (PROBIOTIC-10 PO), Take by mouth, Disp: , Rfl:     sertraline (ZOLOFT) 50 mg tablet, Take 1 tablet (50 mg total) by mouth daily, Disp: 90 tablet, Rfl: 1    traZODone (DESYREL) 50 mg tablet, TAKE 1 TABLET (50 MG TOTAL) BY MOUTH DAILY AT BEDTIME AS NEEDED FOR SLEEP, Disp: 90 tablet, Rfl: 1    azelastine (OPTIVAR) 0.05 % ophthalmic solution, INSTILL 1 DROP INTO BOTH EYES TWICE A DAY (Patient not taking: Reported on 4/8/2025), Disp: 18 mL, Rfl: 1    cromolyn (OPTICROM) 4 % ophthalmic solution, Administer 1 drop to both eyes 4 (four) times a day (Patient not taking: Reported on 4/8/2025), Disp: 10 mL, Rfl: 0    fluocinolone (SYNALAR) 0.025 % ointment, APPLY TO AFFECTED AREA ON THE EYELIDS TWICE A DAY X 7 DAYS MAX AS NEEDED (Patient not taking: Reported on 4/8/2025), Disp: , Rfl:   Social History     Socioeconomic History    Marital status: /Civil Union     Spouse name: Not on file    Number of children: Not on file    Years of education: Not on file    Highest education level: Not on file   Occupational History    Not on file   Tobacco Use    Smoking status: Never     Passive exposure: Never    Smokeless tobacco: Never   Vaping Use    Vaping status: Never Used   Substance and Sexual Activity    Alcohol use: Yes     Comment: social    Drug use: No    Sexual activity: Yes     Birth control/protection: Male Sterilization, Pill   Other Topics Concern    Not on file   Social History Narrative    1 cup  "tea/day- switched recently to decaf    Exercise - walking    Exercises moderately less than 3 times/wk     Social Drivers of Health     Financial Resource Strain: Not on file   Food Insecurity: Not on file   Transportation Needs: Not on file   Physical Activity: Not on file   Stress: Not on file   Social Connections: Not on file   Intimate Partner Violence: Not on file   Housing Stability: Not on file     Family History   Problem Relation Age of Onset    No Known Problems Mother     Leukemia Father     No Known Problems Sister     No Known Problems Daughter     Dementia Maternal Grandmother     Parkinsonism Maternal Grandmother     Stroke Maternal Grandmother     Lymphoma Maternal Grandmother     No Known Problems Paternal Grandmother     No Known Problems Maternal Aunt     No Known Problems Paternal Aunt     Esophageal cancer Family     Breast cancer Neg Hx      Past Medical History:   Diagnosis Date    HL (hearing loss)     Inability to conceive, female     Miscarriage 2009    Tinnitus         Review of Systems   Respiratory: Negative.    Cardiovascular: Negative.    Gastrointestinal: Negative for constipation and diarrhea.   Genitourinary: Negative for difficulty urinating, pelvic pain, vaginal bleeding, vaginal discharge, itching or odor.    O:  Blood pressure 134/72, height 5' 9\" (1.753 m), weight 90.6 kg (199 lb 12.8 oz), not currently breastfeeding.    Patient appears well and is not in distress  Neck is supple without masses  Breasts are symmetrical without mass, tenderness, nipple discharge, skin changes or adenopathy.   Abdomen is soft and nontender without masses.   Vulva without lesions or rashes.  Urethral meatus and urethra are normal  Bladder is normal to palpation  Vagina is normal without discharge or bleeding.   Cervix is normal without discharge or lesion.   Uterus and adnexa are normal, mobile, nontender without palpable mass.  Rectovaginal exam without nodularity/masses/visible blood on glove   "

## 2025-04-08 NOTE — PROGRESS NOTES
Pt here for annual visit.  PAP 2023 neg / neg.    LMP:  Issues with period:  Birth control:  Sexually active:  Issues with sex:  STD testing:   Mammogram: 2024. Ordered for expected date of 2025.  Colonoscopy: Ordered.  Questions or concerns:

## 2025-04-14 ENCOUNTER — OFFICE VISIT (OUTPATIENT)
Dept: FAMILY MEDICINE CLINIC | Facility: CLINIC | Age: 50
End: 2025-04-14
Payer: COMMERCIAL

## 2025-04-14 ENCOUNTER — TELEPHONE (OUTPATIENT)
Age: 50
End: 2025-04-14

## 2025-04-14 VITALS
RESPIRATION RATE: 16 BRPM | HEART RATE: 78 BPM | HEIGHT: 69 IN | WEIGHT: 199.2 LBS | TEMPERATURE: 98.1 F | DIASTOLIC BLOOD PRESSURE: 80 MMHG | SYSTOLIC BLOOD PRESSURE: 120 MMHG | BODY MASS INDEX: 29.51 KG/M2 | OXYGEN SATURATION: 99 %

## 2025-04-14 DIAGNOSIS — E66.3 OVERWEIGHT (BMI 25.0-29.9): Primary | ICD-10-CM

## 2025-04-14 DIAGNOSIS — F41.1 GENERALIZED ANXIETY DISORDER: ICD-10-CM

## 2025-04-14 DIAGNOSIS — E66.3 OVERWEIGHT (BMI 25.0-29.9): ICD-10-CM

## 2025-04-14 PROCEDURE — 99214 OFFICE O/P EST MOD 30 MIN: CPT | Performed by: NURSE PRACTITIONER

## 2025-04-14 NOTE — PROGRESS NOTES
Name: Ana Morse      : 1975      MRN: [id number]  Encounter Provider: KONRAD Molina  Encounter Date: 2025   Encounter department: [facility] [address]  :  Assessment & Plan  Overweight (BMI 25.0-29.9)  Prior Authorization Clinical Questions for Weight Management Pharmacotherapy    2. Does the patient have a diagnosis of obesity, confirmed by a BMI greater than or equal to 30 kg/m^2?: No  3. Does the patient have a BMI of greater than or equal to 27 kg/m^2 with at least one weight-related comorbidity/risk factor/complication (e.g. diabetes, dyslipidemia, coronary artery disease)?: No  4. Weight-related co-morbidities/risk factors: dyslipidemia, female infertility  5. WEGOVY CVA Indication: Does patient have established documented cardiovascular disease (history of a prior heart attack (myocardial infarction), stroke, or symptomatic peripheral arterial disease (PAD)?: N/A  6. ZEPBOUND DEREJE Indication: Does patient have documented DEREJE diagnosed via sleep study (insurance will require copy of sleep study results for approval)?: N/A  7. Has the patient been on a weight loss regimen of low-calorie diet, increased physical activity, and lifestyle modifications for a minimum of 6 months?: Yes  8. Has the patient completed a comprehensive weight loss program (ie, Weight Watchers, Noom, Bariatrics, other santiago on phone)? If so, what?: Yes   -- Q8 Further Explanation: Has participated in weight watchers and Noom without significant weight loss.   9. Does the patient have a history of type 2 diabetes?: No  10. Has the member tried and failed other weight loss medication within the past 12 months?: No  11. Will the member use requested medication in combination with another GLP agonist or weight loss drug?: No  12. Is the medication a controlled substance?: No     Baseline weight (in pounds): 199 lbs       To obtain labs, will call with results.  To begin Wegovy  "weekly.  Counseling importance of consuming a low-carb diet and engaging in daily physical activity.  Follow-up in 3 months or sooner if needed.  Orders:    CBC and differential; Future    Comprehensive metabolic panel; Future    Hemoglobin A1C; Future    Lipid Panel with Direct LDL reflex; Future    TSH, 3rd generation with Free T4 reflex; Future    Semaglutide-Weight Management (WEGOVY) 0.25 MG/0.5ML; Inject 0.5 mL (0.25 mg total) under the skin once a week for 28 days    Comprehensive metabolic panel (3 months); Future    Generalized anxiety disorder  Stable.  To continue buspirone 10 mg 3 times daily and sertraline 50 mg daily.              History of Present Illness   Ana presents reporting weight gain.  She has gained at least 30 to 40 pounds over the past couple years.  She tries to watch her diet and exercise.  She has participated in programs such as weight watchers and Noom with minimal improvement.  She was seen by gynecology and told she is perimenopausal.         Review of Systems   Constitutional:  Positive for unexpected weight change.   HENT: Negative.     Eyes: Negative.    Respiratory: Negative.     Cardiovascular: Negative.    Gastrointestinal: Negative.    Endocrine: Negative.    Genitourinary: Negative.    Musculoskeletal: Negative.    Skin: Negative.    Allergic/Immunologic: Negative.    Neurological: Negative.    Hematological: Negative.    Psychiatric/Behavioral: Negative.         Objective   /80 (BP Location: Left arm)   Pulse 78   Temp 98.1 °F (36.7 °C)   Resp 16   Ht 5' 9\" (1.753 m)   Wt 90.4 kg (199 lb 3.2 oz)   SpO2 99%   BMI 29.42 kg/m²      Physical Exam  Vitals and nursing note reviewed.   Constitutional:       General: She is not in acute distress.     Appearance: Normal appearance. She is well-developed. She is not ill-appearing, toxic-appearing or diaphoretic.   HENT:      Head: Normocephalic and atraumatic.   Eyes:      Conjunctiva/sclera: Conjunctivae normal. "   Cardiovascular:      Rate and Rhythm: Normal rate and regular rhythm.      Heart sounds: Normal heart sounds. No murmur heard.  Pulmonary:      Effort: Pulmonary effort is normal. No respiratory distress.      Breath sounds: Normal breath sounds. No wheezing or rales.   Chest:      Chest wall: No tenderness.   Abdominal:      General: Bowel sounds are normal. There is no distension.      Palpations: Abdomen is soft. There is no mass.      Tenderness: There is no abdominal tenderness. There is no guarding or rebound.      Hernia: No hernia is present.   Musculoskeletal:      Cervical back: Neck supple.   Skin:     General: Skin is warm and dry.      Capillary Refill: Capillary refill takes less than 2 seconds.   Neurological:      General: No focal deficit present.      Mental Status: She is alert and oriented to person, place, and time.   Psychiatric:         Mood and Affect: Mood normal.         Behavior: Behavior normal.         Thought Content: Thought content normal.         Judgment: Judgment normal.

## 2025-04-14 NOTE — TELEPHONE ENCOUNTER
PA for WEGOVY) 0.25 MG/0.5ML SUBMITTED to     via    []CMM-KEY:   [x]Surescripts-  []Availity-Auth ID # NDC #   []Faxed to plan   []Other website   []Phone call Case ID #     [x]PA sent as URGENT    All office notes, labs and other pertaining documents and studies sent. Clinical questions answered. Awaiting determination from insurance company.     Turnaround time for your insurance to make a decision on your Prior Authorization can take 7-21 business days.

## 2025-04-15 ENCOUNTER — APPOINTMENT (OUTPATIENT)
Dept: LAB | Facility: HOSPITAL | Age: 50
End: 2025-04-15
Attending: NURSE PRACTITIONER
Payer: COMMERCIAL

## 2025-04-15 DIAGNOSIS — E66.3 OVERWEIGHT (BMI 25.0-29.9): ICD-10-CM

## 2025-04-15 LAB
ALBUMIN SERPL BCG-MCNC: 4.2 G/DL (ref 3.5–5)
ALP SERPL-CCNC: 70 U/L (ref 34–104)
ALT SERPL W P-5'-P-CCNC: 12 U/L (ref 7–52)
ANION GAP SERPL CALCULATED.3IONS-SCNC: 6 MMOL/L (ref 4–13)
AST SERPL W P-5'-P-CCNC: 15 U/L (ref 13–39)
BASOPHILS # BLD AUTO: 0.04 THOUSANDS/ÂΜL (ref 0–0.1)
BASOPHILS NFR BLD AUTO: 1 % (ref 0–1)
BILIRUB SERPL-MCNC: 0.53 MG/DL (ref 0.2–1)
BUN SERPL-MCNC: 14 MG/DL (ref 5–25)
CALCIUM SERPL-MCNC: 9 MG/DL (ref 8.4–10.2)
CHLORIDE SERPL-SCNC: 105 MMOL/L (ref 96–108)
CHOLEST SERPL-MCNC: 202 MG/DL (ref ?–200)
CO2 SERPL-SCNC: 27 MMOL/L (ref 21–32)
CREAT SERPL-MCNC: 0.76 MG/DL (ref 0.6–1.3)
EOSINOPHIL # BLD AUTO: 0.07 THOUSAND/ÂΜL (ref 0–0.61)
EOSINOPHIL NFR BLD AUTO: 1 % (ref 0–6)
ERYTHROCYTE [DISTWIDTH] IN BLOOD BY AUTOMATED COUNT: 12.6 % (ref 11.6–15.1)
EST. AVERAGE GLUCOSE BLD GHB EST-MCNC: 108 MG/DL
GFR SERPL CREATININE-BSD FRML MDRD: 91 ML/MIN/1.73SQ M
GLUCOSE P FAST SERPL-MCNC: 101 MG/DL (ref 65–99)
HBA1C MFR BLD: 5.4 %
HCT VFR BLD AUTO: 40 % (ref 34.8–46.1)
HDLC SERPL-MCNC: 50 MG/DL
HGB BLD-MCNC: 13.3 G/DL (ref 11.5–15.4)
IMM GRANULOCYTES # BLD AUTO: 0.01 THOUSAND/UL (ref 0–0.2)
IMM GRANULOCYTES NFR BLD AUTO: 0 % (ref 0–2)
LDLC SERPL CALC-MCNC: 123 MG/DL (ref 0–100)
LYMPHOCYTES # BLD AUTO: 1.97 THOUSANDS/ÂΜL (ref 0.6–4.47)
LYMPHOCYTES NFR BLD AUTO: 36 % (ref 14–44)
MCH RBC QN AUTO: 31.2 PG (ref 26.8–34.3)
MCHC RBC AUTO-ENTMCNC: 33.3 G/DL (ref 31.4–37.4)
MCV RBC AUTO: 94 FL (ref 82–98)
MONOCYTES # BLD AUTO: 0.4 THOUSAND/ÂΜL (ref 0.17–1.22)
MONOCYTES NFR BLD AUTO: 7 % (ref 4–12)
NEUTROPHILS # BLD AUTO: 2.97 THOUSANDS/ÂΜL (ref 1.85–7.62)
NEUTS SEG NFR BLD AUTO: 55 % (ref 43–75)
NRBC BLD AUTO-RTO: 0 /100 WBCS
PLATELET # BLD AUTO: 264 THOUSANDS/UL (ref 149–390)
PMV BLD AUTO: 10.1 FL (ref 8.9–12.7)
POTASSIUM SERPL-SCNC: 4.1 MMOL/L (ref 3.5–5.3)
PROT SERPL-MCNC: 6.6 G/DL (ref 6.4–8.4)
RBC # BLD AUTO: 4.26 MILLION/UL (ref 3.81–5.12)
SODIUM SERPL-SCNC: 138 MMOL/L (ref 135–147)
TRIGL SERPL-MCNC: 146 MG/DL (ref ?–150)
TSH SERPL DL<=0.05 MIU/L-ACNC: 2.23 UIU/ML (ref 0.45–4.5)
WBC # BLD AUTO: 5.46 THOUSAND/UL (ref 4.31–10.16)

## 2025-04-15 PROCEDURE — 36415 COLL VENOUS BLD VENIPUNCTURE: CPT

## 2025-04-15 PROCEDURE — 85025 COMPLETE CBC W/AUTO DIFF WBC: CPT

## 2025-04-15 PROCEDURE — 84443 ASSAY THYROID STIM HORMONE: CPT

## 2025-04-15 PROCEDURE — 80061 LIPID PANEL: CPT

## 2025-04-15 PROCEDURE — 83036 HEMOGLOBIN GLYCOSYLATED A1C: CPT

## 2025-04-15 PROCEDURE — 80053 COMPREHEN METABOLIC PANEL: CPT

## 2025-04-15 RX ORDER — SEMAGLUTIDE 0.25 MG/.5ML
INJECTION, SOLUTION SUBCUTANEOUS
Refills: 0 | OUTPATIENT
Start: 2025-04-15

## 2025-04-15 NOTE — TELEPHONE ENCOUNTER
PA for WEGOVY) 0.25 MG/0.5ML  APPROVED     Date(s) approved April 14, 2025 to April 14, 2026       Patient advised by          []MyChart Message  []Phone call   [x]LMOM  []L/M to call office as no active Communication consent on file  []Unable to leave detailed message as VM not approved on Communication consent       Pharmacy advised by    [x]Fax  []Phone call  []Secure Chat    Specialty Pharmacy    []     Approval letter scanned into Media No Not available at decision

## 2025-04-17 ENCOUNTER — RESULTS FOLLOW-UP (OUTPATIENT)
Dept: FAMILY MEDICINE CLINIC | Facility: CLINIC | Age: 50
End: 2025-04-17

## 2025-05-11 DIAGNOSIS — E66.3 OVERWEIGHT (BMI 25.0-29.9): ICD-10-CM

## 2025-05-15 ENCOUNTER — TELEPHONE (OUTPATIENT)
Age: 50
End: 2025-05-15

## 2025-06-02 ENCOUNTER — PREP FOR PROCEDURE (OUTPATIENT)
Age: 50
End: 2025-06-02

## 2025-06-02 DIAGNOSIS — Z12.11 SCREENING FOR COLON CANCER: Primary | ICD-10-CM

## 2025-06-08 ENCOUNTER — OFFICE VISIT (OUTPATIENT)
Dept: URGENT CARE | Facility: CLINIC | Age: 50
End: 2025-06-08
Payer: COMMERCIAL

## 2025-06-08 VITALS
DIASTOLIC BLOOD PRESSURE: 75 MMHG | HEART RATE: 81 BPM | OXYGEN SATURATION: 98 % | SYSTOLIC BLOOD PRESSURE: 103 MMHG | RESPIRATION RATE: 18 BRPM | TEMPERATURE: 97.6 F

## 2025-06-08 DIAGNOSIS — H10.33 ACUTE BACTERIAL CONJUNCTIVITIS OF BOTH EYES: Primary | ICD-10-CM

## 2025-06-08 PROCEDURE — G0382 LEV 3 HOSP TYPE B ED VISIT: HCPCS

## 2025-06-08 PROCEDURE — S9083 URGENT CARE CENTER GLOBAL: HCPCS

## 2025-06-08 RX ORDER — TOBRAMYCIN 3 MG/ML
1 SOLUTION/ DROPS OPHTHALMIC
Qty: 1.8 ML | Refills: 0 | Status: SHIPPED | OUTPATIENT
Start: 2025-06-08 | End: 2025-06-15

## 2025-06-08 NOTE — PROGRESS NOTES
Name: Ana Morse      : 1975      MRN: [id number]  Encounter Provider: KONRAD Carbajal  Encounter Date: 2025   Encounter department: [facility]  :  Assessment & Plan  Acute bacterial conjunctivitis of both eyes    The benefits, risks and alternatives to the treatment plan were discussed at this visit. Patient was advised of common adverse effects of any medical therapies prescribed. All questions were answered and discussed with the patient and any accompanying family members or caretakers.     Orders:    tobramycin (TOBREX) 0.3 % SOLN; Administer 1 drop to both eyes every 4 (four) hours while awake for 7 days        History of Present Illness     Ana Morse is a 49 y.o. female who presents with BL eye drainage & erythema x 1 day. Reports using old eye liner. Denies recent illness. No visual pain or disturbance reported. No contact lenses.    History obtained from: patient    Review of Systems   Constitutional:  Negative for chills, fatigue and fever.   HENT:  Negative for congestion, ear pain, facial swelling, hearing loss, rhinorrhea, sinus pressure, sneezing, sore throat and trouble swallowing.    Eyes:  Positive for discharge and redness. Negative for pain and visual disturbance.   Respiratory:  Negative for cough, chest tightness, shortness of breath and wheezing.    Cardiovascular:  Negative for chest pain and palpitations.   Gastrointestinal:  Negative for abdominal pain, diarrhea, nausea and vomiting.   Genitourinary:  Negative for dysuria, flank pain, hematuria and pelvic pain.   Musculoskeletal:  Negative for arthralgias, back pain and myalgias.   Skin:  Negative for color change and rash.   Neurological:  Negative for dizziness, seizures, syncope, weakness, light-headedness and headaches.   Psychiatric/Behavioral:  Negative for confusion, hallucinations and sleep disturbance. The patient is not nervous/anxious.    All other systems reviewed and are  negative.          Past Medical History   Past Medical History[1]  Past Surgical History[2]  Family History[3]   reports that she has never smoked. She has never been exposed to tobacco smoke. She has never used smokeless tobacco. She reports current alcohol use. She reports that she does not use drugs.  Current Outpatient Medications   Medication Instructions    busPIRone (BUSPAR) 10 mg, Oral, 3 times daily    fluticasone (FLONASE) 50 mcg/act nasal spray 1 spray, Daily    levonorgestrel-ethinyl estradiol (Altavera) 0.15-30 MG-MCG per tablet 1 tablet, Oral, Daily    Multiple Vitamins-Iron (DAILY MULTIPLE VITAMIN/IRON) TABS Take by mouth    Probiotic Product (PROBIOTIC-10 PO) Take by mouth    Semaglutide-Weight Management (WEGOVY) 0.25 mg, Subcutaneous, Weekly    sertraline (ZOLOFT) 50 mg, Oral, Daily    tobramycin (TOBREX) 0.3 % SOLN 1 drop, Both Eyes, Every 4 hours while awake    traZODone (DESYREL) 50 mg, Oral, Daily at bedtime PRN   Allergies[4]   Medications Ordered Prior to Encounter[5]      Objective   /75   Pulse 81   Temp 97.6 °F (36.4 °C)   Resp 18   SpO2 98%      Physical Exam  Vitals and nursing note reviewed.   Constitutional:       General: She is not in acute distress.     Appearance: She is well-developed.   HENT:      Head: Normocephalic and atraumatic.      Right Ear: Hearing and tympanic membrane normal.      Left Ear: Hearing and tympanic membrane normal.      Nose: Nose normal.      Mouth/Throat:      Mouth: Mucous membranes are moist.      Dentition: Normal dentition.      Tongue: No lesions.      Pharynx: Oropharynx is clear. Uvula midline. No oropharyngeal exudate.      Tonsils: No tonsillar exudate.     Eyes:      General:         Right eye: Discharge present.         Left eye: Discharge present.     Extraocular Movements: Extraocular movements intact.      Conjunctiva/sclera:      Right eye: Right conjunctiva is injected.      Left eye: Left conjunctiva is injected.     Neck:       Vascular: No carotid bruit or JVD.     Cardiovascular:      Rate and Rhythm: Normal rate and regular rhythm.      Heart sounds: S1 normal and S2 normal. No murmur heard.  Pulmonary:      Effort: Pulmonary effort is normal. No tachypnea or respiratory distress.      Breath sounds: Normal breath sounds and air entry. No decreased breath sounds.   Chest:      Chest wall: No deformity or tenderness.   Abdominal:      General: Abdomen is flat. Bowel sounds are normal. There is no distension.      Palpations: Abdomen is soft.      Tenderness: There is no abdominal tenderness. There is no right CVA tenderness, left CVA tenderness or guarding.     Musculoskeletal:         General: No swelling.      Cervical back: Full passive range of motion without pain and neck supple.      Right lower leg: No edema.      Left lower leg: No edema.   Lymphadenopathy:      Cervical: No cervical adenopathy.     Skin:     General: Skin is warm and dry.      Capillary Refill: Capillary refill takes less than 2 seconds.      Findings: No rash.     Neurological:      Mental Status: She is alert and oriented to person, place, and time.      Cranial Nerves: Cranial nerves 2-12 are intact.      Sensory: Sensation is intact.      Motor: Motor function is intact.      Coordination: Coordination is intact.      Gait: Gait is intact.     Psychiatric:         Mood and Affect: Mood normal.         Behavior: Behavior is cooperative.              [1]   Past Medical History:  Diagnosis Date    HL (hearing loss)     Inability to conceive, female     Miscarriage     Tinnitus    [2]   Past Surgical History:  Procedure Laterality Date     SECTION      FOOT SURGERY      TOOTH EXTRACTION     [3]   Family History  Problem Relation Name Age of Onset    No Known Problems Mother      Leukemia Father      No Known Problems Sister karen     No Known Problems Daughter      Dementia Maternal Grandmother [place]     Parkinsonism Maternal Grandmother  [place]     Stroke Maternal Grandmother [place]     Lymphoma Maternal Grandmother [place]     No Known Problems Paternal Grandmother      No Known Problems Maternal Aunt iván     No Known Problems Paternal Aunt mookie     Esophageal cancer Family cousin     Breast cancer Neg Hx     [4]   Allergies  Allergen Reactions    Mold Extract [Trichophyton] Itching and Sneezing    Pollen Extract Itching and Sneezing   [5]   Current Outpatient Medications on File Prior to Visit   Medication Sig Dispense Refill    busPIRone (BUSPAR) 10 mg tablet TAKE 1 TABLET BY MOUTH THREE TIMES A  tablet 1    fluticasone (FLONASE) 50 mcg/act nasal spray 1 spray into each nostril in the morning.      levonorgestrel-ethinyl estradiol (Altavera) 0.15-30 MG-MCG per tablet Take 1 tablet by mouth daily 84 tablet 4    Multiple Vitamins-Iron (DAILY MULTIPLE VITAMIN/IRON) TABS Take by mouth      Probiotic Product (PROBIOTIC-10 PO) Take by mouth      Semaglutide-Weight Management (WEGOVY) 0.25 MG/0.5ML Inject 0.5 mL (0.25 mg total) under the skin once a week for 28 days 2 mL 1    sertraline (ZOLOFT) 50 mg tablet Take 1 tablet (50 mg total) by mouth daily 90 tablet 1    traZODone (DESYREL) 50 mg tablet TAKE 1 TABLET (50 MG TOTAL) BY MOUTH DAILY AT BEDTIME AS NEEDED FOR SLEEP 90 tablet 1     No current facility-administered medications on file prior to visit.

## 2025-06-26 ENCOUNTER — PREP FOR PROCEDURE (OUTPATIENT)
Age: 50
End: 2025-06-26

## 2025-07-07 ENCOUNTER — ANESTHESIA EVENT (OUTPATIENT)
Dept: GASTROENTEROLOGY | Facility: HOSPITAL | Age: 50
End: 2025-07-07
Payer: COMMERCIAL

## 2025-07-07 ENCOUNTER — ANESTHESIA (OUTPATIENT)
Dept: GASTROENTEROLOGY | Facility: HOSPITAL | Age: 50
End: 2025-07-07
Payer: COMMERCIAL

## 2025-07-07 ENCOUNTER — HOSPITAL ENCOUNTER (OUTPATIENT)
Dept: GASTROENTEROLOGY | Facility: HOSPITAL | Age: 50
Setting detail: OUTPATIENT SURGERY
Discharge: HOME | End: 2025-07-07
Attending: INTERNAL MEDICINE
Payer: COMMERCIAL

## 2025-07-07 VITALS
TEMPERATURE: 97.8 F | RESPIRATION RATE: 20 BRPM | HEIGHT: 70 IN | WEIGHT: 176.81 LBS | SYSTOLIC BLOOD PRESSURE: 127 MMHG | OXYGEN SATURATION: 99 % | BODY MASS INDEX: 25.31 KG/M2 | DIASTOLIC BLOOD PRESSURE: 75 MMHG | HEART RATE: 58 BPM

## 2025-07-07 DIAGNOSIS — Z12.11 SCREENING FOR COLON CANCER: ICD-10-CM

## 2025-07-07 DIAGNOSIS — E66.3 OVERWEIGHT: ICD-10-CM

## 2025-07-07 PROCEDURE — 45385 COLONOSCOPY W/LESION REMOVAL: CPT | Performed by: INTERNAL MEDICINE

## 2025-07-07 PROCEDURE — 88305 TISSUE EXAM BY PATHOLOGIST: CPT | Performed by: STUDENT IN AN ORGANIZED HEALTH CARE EDUCATION/TRAINING PROGRAM

## 2025-07-07 RX ORDER — SODIUM CHLORIDE, SODIUM LACTATE, POTASSIUM CHLORIDE, CALCIUM CHLORIDE 600; 310; 30; 20 MG/100ML; MG/100ML; MG/100ML; MG/100ML
INJECTION, SOLUTION INTRAVENOUS CONTINUOUS PRN
Status: DISCONTINUED | OUTPATIENT
Start: 2025-07-07 | End: 2025-07-07

## 2025-07-07 RX ORDER — LIDOCAINE HYDROCHLORIDE 20 MG/ML
INJECTION, SOLUTION EPIDURAL; INFILTRATION; INTRACAUDAL; PERINEURAL AS NEEDED
Status: DISCONTINUED | OUTPATIENT
Start: 2025-07-07 | End: 2025-07-07

## 2025-07-07 RX ORDER — PROPOFOL 10 MG/ML
INJECTION, EMULSION INTRAVENOUS AS NEEDED
Status: DISCONTINUED | OUTPATIENT
Start: 2025-07-07 | End: 2025-07-07

## 2025-07-07 RX ORDER — METOCLOPRAMIDE HYDROCHLORIDE 5 MG/ML
10 INJECTION INTRAMUSCULAR; INTRAVENOUS EVERY 6 HOURS PRN
Status: DISCONTINUED | OUTPATIENT
Start: 2025-07-07 | End: 2025-07-11 | Stop reason: HOSPADM

## 2025-07-07 RX ADMIN — PROPOFOL 35 MG: 10 INJECTION, EMULSION INTRAVENOUS at 10:20

## 2025-07-07 RX ADMIN — METOCLOPRAMIDE 10 MG: 5 INJECTION, SOLUTION INTRAMUSCULAR; INTRAVENOUS at 10:48

## 2025-07-07 RX ADMIN — PROPOFOL 100 MG: 10 INJECTION, EMULSION INTRAVENOUS at 10:15

## 2025-07-07 RX ADMIN — SODIUM CHLORIDE, SODIUM LACTATE, POTASSIUM CHLORIDE, AND CALCIUM CHLORIDE: .6; .31; .03; .02 INJECTION, SOLUTION INTRAVENOUS at 08:59

## 2025-07-07 RX ADMIN — LIDOCAINE HYDROCHLORIDE 100 MG: 20 INJECTION, SOLUTION EPIDURAL; INFILTRATION; INTRACAUDAL at 10:14

## 2025-07-07 RX ADMIN — PROPOFOL 35 MG: 10 INJECTION, EMULSION INTRAVENOUS at 10:26

## 2025-07-07 RX ADMIN — PROPOFOL 65 MG: 10 INJECTION, EMULSION INTRAVENOUS at 10:16

## 2025-07-07 RX ADMIN — PROPOFOL 35 MG: 10 INJECTION, EMULSION INTRAVENOUS at 10:19

## 2025-07-07 RX ADMIN — PROPOFOL 25 MG: 10 INJECTION, EMULSION INTRAVENOUS at 10:22

## 2025-07-07 RX ADMIN — PROPOFOL 35 MG: 10 INJECTION, EMULSION INTRAVENOUS at 10:29

## 2025-07-07 NOTE — ANESTHESIA PREPROCEDURE EVALUATION
Procedure:  COLONOSCOPY    Relevant Problems   ANESTHESIA (within normal limits)      ENDO (within normal limits)      GI/HEPATIC (within normal limits)      /RENAL (within normal limits)      GYN (within normal limits)      HEMATOLOGY (within normal limits)      MUSCULOSKELETAL (within normal limits)      NEURO/PSYCH   (+) Generalized anxiety disorder        Physical Exam    Airway     Mallampati score: II  TM Distance: >3 FB  Neck ROM: full      Cardiovascular  Cardiovascular exam normal    Dental   No notable dental hx     Pulmonary  Pulmonary exam normal     Neurological  - normal exam    Other Findings  post-pubertal.      Anesthesia Plan  ASA Score- 1     Anesthesia Type- IV sedation with anesthesia with ASA Monitors.         Additional Monitors:     Airway Plan:            Plan Factors-Exercise tolerance (METS): >4 METS.    Chart reviewed.   Existing labs reviewed. Patient summary reviewed.    Patient is not a current smoker.              Induction- intravenous.    Postoperative Plan- .   Monitoring Plan - Monitoring plan - standard ASA monitoring  Post Operative Pain Plan - non-opiod analgesics    Perioperative Resuscitation Plan - Level 1 - Full Code.       Informed Consent- Anesthetic plan and risks discussed with patient.        NPO Status:  Vitals Value Taken Time   Date of last liquid 07/07/25 07/07/25 09:01   Time of last liquid 0300 07/07/25 09:01   Date of last solid 07/05/25 07/07/25 09:01   Time of last solid 1800 07/07/25 09:01       Discussed IV Sedation with General Anesthesia as backup with patient including but not limited to risk of cardiac insult, pulmonary complication, stroke, reaction to medications and death. All questions answered and consent was obtained.

## 2025-07-07 NOTE — INTERVAL H&P NOTE
H&P reviewed. After examining the patient I find no changes in the patients condition since the H&P had been written.    Vitals:    07/07/25 0909   BP: 115/75   Pulse: 70   Resp: 16   Temp: 98 °F (36.7 °C)   SpO2: 99%

## 2025-07-07 NOTE — ANESTHESIA POSTPROCEDURE EVALUATION
Post-Op Assessment Note    CV Status:  Stable  Pain Score: 0    Pain management: adequate       Mental Status:  Alert and awake   Hydration Status:  Euvolemic   PONV Controlled:  Controlled   Airway Patency:  Patent     Post Op Vitals Reviewed: Yes    No anethesia notable event occurred.    Staff: CRNA           Last Filed PACU Vitals:  Vitals Value Taken Time   Temp 97.8 °F (36.6 °C) 07/07/25 10:35   Pulse 75 07/07/25 10:35   /60 07/07/25 10:35   Resp 15 07/07/25 10:35   SpO2 97 % 07/07/25 10:35       Modified Benjamin:     Vitals Value Taken Time   Activity 2 07/07/25 11:00   Respiration 2 07/07/25 11:00   Circulation 2 07/07/25 11:00   Consciousness 2 07/07/25 11:00   Oxygen Saturation 2 07/07/25 11:00     Modified Benjamin Score: 10

## 2025-07-07 NOTE — H&P
History and Physical -  [facility]  Ana Morse 49 y.o. female MRN: [id number]                  HPI: Ana Morse is a 49 y.o. year old female who presents for average risk colon cancer screening.  Colonoscopy 10 years ago      REVIEW OF SYSTEMS: Per the HPI, and otherwise unremarkable.    Historical Information   Past Medical History[1]  Past Surgical History[2]  Social History   Social History     Substance and Sexual Activity   Alcohol Use Yes    Comment: social     Social History     Substance and Sexual Activity   Drug Use No     Tobacco Use History[3]  Family History[4]    Meds/Allergies     Not in a hospital admission.    Allergies[5]    Objective     not currently breastfeeding.      PHYSICAL EXAM    Gen: NAD  CV: RRR  CHEST: Clear  ABD: soft, NT/ND  EXT: no edema  Neuro: AAO      ASSESSMENT/PLAN:  This is a 49 y.o. year old female here for average risk screening    PLAN:   Procedure: Colonoscopy               [1]   Past Medical History:  Diagnosis Date    HL (hearing loss)     Inability to conceive, female     Miscarriage     Tinnitus    [2]   Past Surgical History:  Procedure Laterality Date     SECTION      FOOT SURGERY      TOOTH EXTRACTION     [3]   Social History  Tobacco Use   Smoking Status Never    Passive exposure: Never   Smokeless Tobacco Never   [4]   Family History  Problem Relation Name Age of Onset    No Known Problems Mother      Leukemia Father      No Known Problems Sister karen     No Known Problems Daughter      Dementia Maternal Grandmother [place]     Parkinsonism Maternal Grandmother [place]     Stroke Maternal Grandmother [place]     Lymphoma Maternal Grandmother [place]     No Known Problems Paternal Grandmother      No Known Problems Maternal Aunt iván     No Known Problems Paternal Aunt mookie     Esophageal cancer Family cousin     Breast cancer Neg Hx     [5]   Allergies  Allergen Reactions    Mold Extract [Trichophyton] Itching and  Sneezing    Pollen Extract Itching and Sneezing

## 2025-07-10 DIAGNOSIS — E66.3 OVERWEIGHT: ICD-10-CM

## 2025-07-10 PROCEDURE — 88305 TISSUE EXAM BY PATHOLOGIST: CPT | Performed by: STUDENT IN AN ORGANIZED HEALTH CARE EDUCATION/TRAINING PROGRAM

## 2025-07-10 RX ORDER — SEMAGLUTIDE 0.25 MG/.5ML
0.25 INJECTION, SOLUTION SUBCUTANEOUS WEEKLY
Qty: 2 ML | Refills: 2 | Status: SHIPPED | OUTPATIENT
Start: 2025-07-10 | End: 2025-07-17

## 2025-07-11 RX ORDER — SEMAGLUTIDE 0.25 MG/.5ML
0.25 INJECTION, SOLUTION SUBCUTANEOUS WEEKLY
Refills: 2 | OUTPATIENT
Start: 2025-07-11

## 2025-07-17 DIAGNOSIS — E66.3 OVERWEIGHT (BMI 25.0-29.9): Primary | ICD-10-CM

## 2025-07-17 RX ORDER — SEMAGLUTIDE 0.5 MG/.5ML
INJECTION, SOLUTION SUBCUTANEOUS
Qty: 2 ML | Refills: 0 | Status: SHIPPED | OUTPATIENT
Start: 2025-07-17 | End: 2025-07-21

## 2025-07-18 ENCOUNTER — TELEMEDICINE (OUTPATIENT)
Dept: OTHER | Facility: HOSPITAL | Age: 50
End: 2025-07-18
Payer: COMMERCIAL

## 2025-07-18 DIAGNOSIS — J01.00 ACUTE NON-RECURRENT MAXILLARY SINUSITIS: Primary | ICD-10-CM

## 2025-07-18 PROBLEM — Z00.00 ANNUAL PHYSICAL EXAM: Status: RESOLVED | Noted: 2021-04-05 | Resolved: 2025-07-18

## 2025-07-18 PROCEDURE — 99213 OFFICE O/P EST LOW 20 MIN: CPT | Performed by: PHYSICIAN ASSISTANT

## 2025-07-18 RX ORDER — PREDNISONE 20 MG/1
40 TABLET ORAL DAILY
Qty: 6 TABLET | Refills: 0 | Status: SHIPPED | OUTPATIENT
Start: 2025-07-18 | End: 2025-07-21

## 2025-07-18 NOTE — PROGRESS NOTES
Virtual Regular Visit  Name: Ana Morse      : 1975      MRN: [id number]  Encounter Provider: Shannon D Severino, PA-C  Encounter Date: 2025   Encounter department: [facility]       Verification of patient location:  Patient is located at Home in the following state in which I hold an active license [place] :  Assessment & Plan  Acute non-recurrent maxillary sinusitis    Orders:    amoxicillin-clavulanate (AUGMENTIN) 875-125 mg per tablet; Take 1 tablet by mouth 2 (two) times a day for 10 days    predniSONE 20 mg tablet; Take 2 tablets (40 mg total) by mouth daily for 3 days        Encounter provider Shannon D Severino, PA-C    The patient was identified by name and date of birth. Ana Morse was informed that this is a telemedicine visit and that the visit is being conducted through the Epic Embedded platform. She agrees to proceed..  My office door was closed. No one else was in the room. She acknowledged consent and understanding of privacy and security of the video platform. The patient has agreed to participate and understands they can discontinue the visit at any time.    Patient is aware this is a billable service.     History obtained from: patient  History of Present Illness     Pt reports had the flu starting 5 days ago. No fever since 2 days ago. Now having significant facial pain and pressure, clogged ear sensation. Advil sinus without relief.       Review of Systems   Constitutional:  Negative for fever.   HENT:  Positive for congestion, ear pain, sinus pressure and sinus pain. Negative for nosebleeds.    Eyes:  Negative for redness.   Respiratory:  Negative for shortness of breath.    Cardiovascular:  Negative for chest pain.   Gastrointestinal:  Negative for blood in stool.   Genitourinary:  Negative for hematuria.   Musculoskeletal:  Negative for gait problem.   Skin:  Negative for rash.   Neurological:  Positive for headaches. Negative for seizures.   Psychiatric/Behavioral:  Negative  for behavioral problems.        Objective   There were no vitals taken for this visit.    Physical Exam  Constitutional:       General: She is not in acute distress.     Appearance: Normal appearance. She is not toxic-appearing.   HENT:      Head: Normocephalic and atraumatic.      Nose: No rhinorrhea.      Right Sinus: Maxillary sinus tenderness present.      Left Sinus: Maxillary sinus tenderness present.      Comments: Sounds congested     Mouth/Throat:      Mouth: Mucous membranes are moist.     Eyes:      Conjunctiva/sclera: Conjunctivae normal.     Pulmonary:      Effort: Pulmonary effort is normal. No respiratory distress.      Breath sounds: No wheezing (no gross audible wheeze through computer).     Musculoskeletal:      Cervical back: Normal range of motion.     Skin:     Findings: No rash (on face or neck).     Neurological:      Mental Status: She is alert.      Cranial Nerves: No dysarthria or facial asymmetry.     Psychiatric:         Mood and Affect: Mood normal.         Behavior: Behavior normal.         Visit Time  Total Visit Duration: 8 minutes not including the time spent for establishing the audio/video connection.

## 2025-07-18 NOTE — PATIENT INSTRUCTIONS
"As discussed, sinus infections are typically viral and will get better on their own in 7-10 days. You should try symptomatic relief in the meantime with OTC antihistamine (Claritin or Zyrtec), Afrin for up to 3 days then switch to Flonase, and Sudafed (behind the counter) and mucinex. You can also try sinus rinses with a neti pot or nasal lavage (be sure to use distilled water.) Sleep with a cool mist humidifier at your bedside. If your symptoms do not improve after 7-10 days, you may need antibiotics because it is more likely to be bacterial at that point.  Follow-up with your primary care physician for recheck in 2-3 business days. Go to the ER for sudden severe headache, high fevers, change in vision, seizure activity or anything else that is concerning.    [facility] Phone number is [phone] if you need assistance or have further questions  Note for work/school can be found in \"Letters\" section of [employer]     Prednisone/Medrol- steroid (powerful anti-inflammatory) once daily in morning or early afternoon. -can make you hyper, agitated, feel flushed or like your heart is racing. Can increase appetite and lead to weight gain.     "

## 2025-07-21 ENCOUNTER — OFFICE VISIT (OUTPATIENT)
Dept: FAMILY MEDICINE CLINIC | Facility: CLINIC | Age: 50
End: 2025-07-21
Payer: COMMERCIAL

## 2025-07-21 VITALS
WEIGHT: 174 LBS | DIASTOLIC BLOOD PRESSURE: 78 MMHG | HEART RATE: 63 BPM | BODY MASS INDEX: 24.91 KG/M2 | HEIGHT: 70 IN | TEMPERATURE: 99 F | SYSTOLIC BLOOD PRESSURE: 126 MMHG | OXYGEN SATURATION: 98 %

## 2025-07-21 DIAGNOSIS — Z00.00 WELL ADULT EXAM: Primary | ICD-10-CM

## 2025-07-21 DIAGNOSIS — J40 BRONCHITIS: ICD-10-CM

## 2025-07-21 DIAGNOSIS — E66.3 OVERWEIGHT (BMI 25.0-29.9): ICD-10-CM

## 2025-07-21 PROCEDURE — 99396 PREV VISIT EST AGE 40-64: CPT | Performed by: NURSE PRACTITIONER

## 2025-07-21 PROCEDURE — 99214 OFFICE O/P EST MOD 30 MIN: CPT | Performed by: NURSE PRACTITIONER

## 2025-07-21 RX ORDER — ALBUTEROL SULFATE 90 UG/1
2 INHALANT RESPIRATORY (INHALATION) EVERY 6 HOURS PRN
Qty: 8 G | Refills: 1 | Status: SHIPPED | OUTPATIENT
Start: 2025-07-21

## 2025-07-21 RX ORDER — BENZONATATE 200 MG/1
200 CAPSULE ORAL 3 TIMES DAILY PRN
Qty: 20 CAPSULE | Refills: 0 | Status: SHIPPED | OUTPATIENT
Start: 2025-07-21

## 2025-07-21 RX ORDER — TIRZEPATIDE 5 MG/.5ML
5 INJECTION, SOLUTION SUBCUTANEOUS WEEKLY
Qty: 2 ML | Refills: 0 | Status: SHIPPED | OUTPATIENT
Start: 2025-07-21

## 2025-07-21 NOTE — PROGRESS NOTES
Adult Annual Physical  Name: Ana Morse      : 1975      MRN: [id number]  Encounter Provider: KONRAD Molina  Encounter Date: 2025   Encounter department: [facility] [address]    :  Assessment & Plan  Well adult exam         Overweight (BMI 25.0-29.9)  Currently on Wegovy 0.5 mg weekly and doing well.  Starting weight was 199 lb (25) patient is down to 174 lb today.  Requesting to switch to Zepbound as it is most cost effective with her insurance.  To begin Zepbound 5 mg daily.  To continue healthy food choices and engaging in daily physical activity.  Orders:    tirzepatide (Zepbound) 5 mg/0.5 mL auto-injector; Inject 0.5 mL (5 mg total) under the skin once a week    Basic metabolic panel; Future    Bronchitis  Patient developed nasal congestion and most recently a cough 8 days ago.  Was evaluated via virtual visit and prescribed a prednisone burst as well as Augmentin.  She was holding off on starting Augmentin until coming here today.  Encouraged patient to begin Augmentin.  Added albuterol as needed and benzonatate for for management of cough.  Counseled on the importance of resting, increasing fluid intake, beginning sinus rinses, and using a cool-mist humidifier at nighttime.  To take Tylenol or Motrin as needed for fever or discomfort.  To follow-up if no improvement in 1 week or sooner if symptoms worsen.     Orders:    albuterol (PROVENTIL HFA,VENTOLIN HFA) 90 mcg/act inhaler; Inhale 2 puffs every 6 (six) hours as needed for wheezing or shortness of breath    benzonatate (TESSALON) 200 MG capsule; Take 1 capsule (200 mg total) by mouth 3 (three) times a day as needed for cough        Preventive Screenings:  - Diabetes Screening: screening up-to-date  - Hepatitis C screening: screening up-to-date   - HIV screening: screening up-to-date   - Cervical cancer screening: screening up-to-date   - Breast cancer screening: screening up-to-date   - Colon  cancer screening: screening up-to-date   - Lung cancer screening: screening not indicated     Immunizations:  - Immunizations due: Prevnar 20, Tdap and Zoster (Shingrix)    Counseling/Anticipatory Guidance:    - Dental health: discussed importance of regular tooth brushing, flossing, and dental visits.   - Sexual health: discussed sexually transmitted diseases, partner selection, use of condoms, avoidance of unintended pregnancy, and contraceptive alternatives.   - Diet: discussed recommendations for a healthy/well-balanced diet.   - Exercise: the importance of regular exercise/physical activity was discussed. Recommend exercise 3-5 times per week for at least 30 minutes.   - Injury prevention: discussed safety/seat belts, safety helmets, smoke detectors, carbon monoxide detectors, and smoking near bedding or upholstery.          History of Present Illness     Adult Annual Physical:  Patient presents for annual physical. Starting 199 lb.     Diet and Physical Activity:  - Diet/Nutrition: portion control and well balanced diet.  - Exercise: 3-4 times a week on average, walking and 30-60 minutes on average.    General Health:  - Sleep: sleeps well.  - Hearing: normal hearing bilateral ears.  - Vision: most recent eye exam < 1 year ago and wears glasses.  - Dental: regular dental visits.    /GYN Health:    - Menopause: perimenopausal.   - Contraception: male partner had vasectomy and oral contraceptives. LMP 2 months      Review of Systems   Constitutional: Negative.    HENT: Negative.     Eyes: Negative.    Respiratory:  Positive for cough, chest tightness and shortness of breath.    Cardiovascular: Negative.    Gastrointestinal: Negative.    Endocrine: Negative.    Genitourinary: Negative.    Musculoskeletal: Negative.    Skin: Negative.    Allergic/Immunologic: Negative.    Neurological: Negative.    Hematological: Negative.    Psychiatric/Behavioral: Negative.       Medications Ordered Prior to Encounter[1]  "    Objective   /78   Pulse 63   Temp 99 °F (37.2 °C)   Ht 5' 10\" (1.778 m)   Wt 78.9 kg (174 lb)   SpO2 98%   BMI 24.97 kg/m²     Physical Exam  Vitals and nursing note reviewed.   Constitutional:       General: She is not in acute distress.     Appearance: Normal appearance. She is well-developed. She is not ill-appearing, toxic-appearing or diaphoretic.   HENT:      Head: Normocephalic and atraumatic.      Right Ear: Tympanic membrane and external ear normal.      Left Ear: Tympanic membrane and external ear normal.      Nose: Nose normal.      Mouth/Throat:      Mouth: Mucous membranes are moist.      Pharynx: Oropharynx is clear. No oropharyngeal exudate.     Eyes:      Conjunctiva/sclera: Conjunctivae normal.      Pupils: Pupils are equal, round, and reactive to light.     Neck:      Thyroid: No thyromegaly.     Cardiovascular:      Rate and Rhythm: Normal rate and regular rhythm.      Pulses: Normal pulses.      Heart sounds: Normal heart sounds. No murmur heard.  Pulmonary:      Effort: Pulmonary effort is normal. No respiratory distress.      Breath sounds: Normal breath sounds. No stridor. No wheezing or rales.      Comments: Frequent tight cough on exam   Chest:      Chest wall: No tenderness.   Abdominal:      General: Bowel sounds are normal. There is no distension.      Palpations: Abdomen is soft. There is no mass.      Tenderness: There is no abdominal tenderness. There is no guarding or rebound.      Hernia: No hernia is present.     Musculoskeletal:         General: Normal range of motion.      Cervical back: Normal range of motion and neck supple.   Lymphadenopathy:      Cervical: No cervical adenopathy.     Skin:     General: Skin is warm and dry.      Capillary Refill: Capillary refill takes less than 2 seconds.     Neurological:      General: No focal deficit present.      Mental Status: She is alert and oriented to person, place, and time.      Cranial Nerves: No cranial nerve " deficit.      Gait: Gait normal.     Psychiatric:         Mood and Affect: Mood normal.         Behavior: Behavior normal.         Thought Content: Thought content normal.         Judgment: Judgment normal.                [1]   Current Outpatient Medications on File Prior to Visit   Medication Sig Dispense Refill    amoxicillin-clavulanate (AUGMENTIN) 875-125 mg per tablet Take 1 tablet by mouth 2 (two) times a day for 10 days 20 tablet 0    busPIRone (BUSPAR) 10 mg tablet TAKE 1 TABLET BY MOUTH THREE TIMES A  tablet 1    fluticasone (FLONASE) 50 mcg/act nasal spray 1 spray into each nostril in the morning.      levonorgestrel-ethinyl estradiol (Altavera) 0.15-30 MG-MCG per tablet Take 1 tablet by mouth daily 84 tablet 4    Multiple Vitamins-Iron (DAILY MULTIPLE VITAMIN/IRON) TABS Take by mouth      predniSONE 20 mg tablet Take 2 tablets (40 mg total) by mouth daily for 3 days 6 tablet 0    Probiotic Product (PROBIOTIC-10 PO) Take by mouth      sertraline (ZOLOFT) 50 mg tablet Take 1 tablet (50 mg total) by mouth daily 90 tablet 1    traZODone (DESYREL) 50 mg tablet TAKE 1 TABLET (50 MG TOTAL) BY MOUTH DAILY AT BEDTIME AS NEEDED FOR SLEEP 90 tablet 1    [DISCONTINUED] Semaglutide-Weight Management (Wegovy) 0.5 MG/0.5ML Inject 0.5 mg under the skin weekly 2 mL 0     No current facility-administered medications on file prior to visit.

## 2025-07-22 ENCOUNTER — TELEPHONE (OUTPATIENT)
Age: 50
End: 2025-07-22

## 2025-07-22 DIAGNOSIS — E66.3 OVERWEIGHT (BMI 25.0-29.9): ICD-10-CM

## 2025-07-22 RX ORDER — TIRZEPATIDE 5 MG/.5ML
INJECTION, SOLUTION SUBCUTANEOUS
Refills: 0 | OUTPATIENT
Start: 2025-07-22

## 2025-07-22 NOTE — TELEPHONE ENCOUNTER
PA for (Zepbound) 5 mg SUBMITTED         via    [x]SS    [x]PA sent as URGENT    All office notes, labs and other pertaining documents and studies sent. Clinical questions answered. Awaiting determination from insurance company.     Turnaround time for your insurance to make a decision on your Prior Authorization can take 7-21 business days.

## 2025-07-23 NOTE — TELEPHONE ENCOUNTER
PA for (Zepbound) 5 mg  EXCLUDED from plan       Reason:(Screenshot if applicable)        Message sent to office clinical pool Yes

## 2025-07-24 RX ORDER — TIRZEPATIDE 5 MG/.5ML
INJECTION, SOLUTION SUBCUTANEOUS
Refills: 0 | OUTPATIENT
Start: 2025-07-24

## 2025-07-24 RX ORDER — TIRZEPATIDE 5 MG/.5ML
5 INJECTION, SOLUTION SUBCUTANEOUS WEEKLY
Qty: 2 ML | Refills: 0 | OUTPATIENT
Start: 2025-07-24

## 2025-07-24 NOTE — TELEPHONE ENCOUNTER
I think the auth was denied because she wasn't started on the 2.5mg probably but it says on the auth it is excluded from her plan so I dont hink this is covered anyways

## 2025-07-28 DIAGNOSIS — E66.09 OBESITY DUE TO EXCESS CALORIES WITHOUT SERIOUS COMORBIDITY, UNSPECIFIED CLASS: ICD-10-CM

## 2025-07-28 DIAGNOSIS — E66.09 OBESITY DUE TO EXCESS CALORIES WITHOUT SERIOUS COMORBIDITY, UNSPECIFIED CLASS: Primary | ICD-10-CM

## 2025-07-28 DIAGNOSIS — E66.3 OVERWEIGHT (BMI 25.0-29.9): ICD-10-CM

## 2025-07-28 RX ORDER — TIRZEPATIDE 2.5 MG/.5ML
2.5 INJECTION, SOLUTION SUBCUTANEOUS WEEKLY
Qty: 2 ML | Refills: 0 | Status: SHIPPED | OUTPATIENT
Start: 2025-07-28 | End: 2025-07-31

## 2025-07-28 NOTE — TELEPHONE ENCOUNTER
LDA/ episode removed as wounds were healed at last encounter They will not cover this for her per prior auth team

## 2025-07-28 NOTE — TELEPHONE ENCOUNTER
The prior authorization team received an electronic PA request for the Zepbound 2.5 mg, however, Zepbound is excluded from the patient's plan and was denied due to it being a benefit exclusion on 7/23/25.

## 2025-07-29 RX ORDER — TIRZEPATIDE 2.5 MG/.5ML
2.5 INJECTION, SOLUTION SUBCUTANEOUS WEEKLY
Refills: 0 | OUTPATIENT
Start: 2025-07-29 | End: 2025-08-26

## 2025-07-30 DIAGNOSIS — E66.09 OBESITY DUE TO EXCESS CALORIES WITHOUT SERIOUS COMORBIDITY, UNSPECIFIED CLASS: ICD-10-CM

## 2025-07-31 ENCOUNTER — TELEPHONE (OUTPATIENT)
Age: 50
End: 2025-07-31

## 2025-07-31 DIAGNOSIS — E66.09 OBESITY DUE TO EXCESS CALORIES WITHOUT SERIOUS COMORBIDITY, UNSPECIFIED CLASS: Primary | ICD-10-CM

## 2025-07-31 DIAGNOSIS — E66.09 OBESITY DUE TO EXCESS CALORIES WITHOUT SERIOUS COMORBIDITY, UNSPECIFIED CLASS: ICD-10-CM

## 2025-07-31 RX ORDER — SEMAGLUTIDE 0.5 MG/.5ML
INJECTION, SOLUTION SUBCUTANEOUS
Refills: 0 | OUTPATIENT
Start: 2025-07-31

## 2025-07-31 RX ORDER — SEMAGLUTIDE 0.5 MG/.5ML
INJECTION, SOLUTION SUBCUTANEOUS
Qty: 2 ML | Refills: 0 | Status: SHIPPED | OUTPATIENT
Start: 2025-07-31

## 2025-07-31 RX ORDER — TIRZEPATIDE 2.5 MG/.5ML
2.5 INJECTION, SOLUTION SUBCUTANEOUS WEEKLY
Refills: 0 | OUTPATIENT
Start: 2025-07-31 | End: 2025-08-28

## 2025-08-03 DIAGNOSIS — F41.1 GENERALIZED ANXIETY DISORDER: ICD-10-CM

## 2025-08-06 ENCOUNTER — HOSPITAL ENCOUNTER (OUTPATIENT)
Age: 50
Discharge: HOME | End: 2025-08-06
Payer: COMMERCIAL

## 2025-08-06 VITALS — WEIGHT: 174 LBS | HEIGHT: 70 IN | BODY MASS INDEX: 24.91 KG/M2

## 2025-08-06 DIAGNOSIS — Z12.31 ENCOUNTER FOR SCREENING MAMMOGRAM FOR MALIGNANT NEOPLASM OF BREAST: ICD-10-CM

## 2025-08-06 PROCEDURE — 77067 SCR MAMMO BI INCL CAD: CPT

## 2025-08-06 PROCEDURE — 77063 BREAST TOMOSYNTHESIS BI: CPT

## 2025-08-07 ENCOUNTER — NURSE TRIAGE (OUTPATIENT)
Age: 50
End: 2025-08-07

## 2025-08-07 DIAGNOSIS — B37.31 YEAST INFECTION INVOLVING THE VAGINA AND SURROUNDING AREA: Primary | ICD-10-CM

## 2025-08-07 RX ORDER — FLUCONAZOLE 150 MG/1
150 TABLET ORAL DAILY
Qty: 1 TABLET | Refills: 0 | Status: SHIPPED | OUTPATIENT
Start: 2025-08-07 | End: 2025-08-08

## 2025-08-21 ENCOUNTER — NURSE TRIAGE (OUTPATIENT)
Age: 50
End: 2025-08-21

## 2025-08-22 ENCOUNTER — OFFICE VISIT (OUTPATIENT)
Dept: OBGYN CLINIC | Facility: CLINIC | Age: 50
End: 2025-08-22

## 2025-08-22 VITALS — BODY MASS INDEX: 24.97 KG/M2 | WEIGHT: 174 LBS | DIASTOLIC BLOOD PRESSURE: 82 MMHG | SYSTOLIC BLOOD PRESSURE: 128 MMHG

## 2025-08-22 DIAGNOSIS — R30.0 DYSURIA: ICD-10-CM

## 2025-08-22 DIAGNOSIS — L29.2 VULVAR ITCHING: ICD-10-CM

## 2025-08-22 DIAGNOSIS — N89.8 VAGINAL DISCHARGE: Primary | ICD-10-CM

## 2025-08-22 LAB
BV WHIFF TEST VAG QL: NEGATIVE
CLUE CELLS SPEC QL WET PREP: NEGATIVE
PH SMN: 5 [PH]
SL AMB  POCT GLUCOSE, UA: NORMAL
SL AMB LEUKOCYTE ESTERASE,UA: NORMAL
SL AMB POCT BILIRUBIN,UA: NORMAL
SL AMB POCT BLOOD,UA: NORMAL
SL AMB POCT KETONES,UA: NORMAL
SL AMB POCT NITRITE,UA: NORMAL
SL AMB POCT PH,UA: 5
SL AMB POCT SPECIFIC GRAVITY,UA: 1.01
SL AMB POCT URINE PROTEIN: NORMAL
SL AMB POCT UROBILINOGEN: 0.2
T VAGINALIS VAG QL WET PREP: NEGATIVE
YEAST VAG QL WET PREP: NEGATIVE

## 2025-08-22 RX ORDER — NYSTATIN AND TRIAMCINOLONE ACETONIDE 100000; 1 [USP'U]/G; MG/G
CREAM TOPICAL 2 TIMES DAILY
Qty: 15 G | Refills: 0 | Status: SHIPPED | OUTPATIENT
Start: 2025-08-22 | End: 2025-08-29

## 2025-08-23 LAB
BACTERIA UR CULT: NORMAL
CANDIDA RRNA VAG QL PROBE: DETECTED
G VAGINALIS RRNA GENITAL QL PROBE: NOT DETECTED
T VAGINALIS RRNA GENITAL QL PROBE: NOT DETECTED